# Patient Record
Sex: FEMALE | Race: WHITE | Employment: FULL TIME | URBAN - METROPOLITAN AREA
[De-identification: names, ages, dates, MRNs, and addresses within clinical notes are randomized per-mention and may not be internally consistent; named-entity substitution may affect disease eponyms.]

---

## 2017-02-07 DIAGNOSIS — F32.A DEPRESSION: ICD-10-CM

## 2017-02-07 DIAGNOSIS — E03.9 ACQUIRED HYPOTHYROIDISM: ICD-10-CM

## 2017-02-07 RX ORDER — LEVOTHYROXINE SODIUM 125 UG/1
TABLET ORAL
Qty: 90 TAB | Refills: 0 | Status: SHIPPED | OUTPATIENT
Start: 2017-02-07 | End: 2017-05-03 | Stop reason: SDUPTHER

## 2017-02-07 RX ORDER — PAROXETINE HYDROCHLORIDE 20 MG/1
TABLET, FILM COATED ORAL
Qty: 90 TAB | Refills: 0 | Status: SHIPPED | OUTPATIENT
Start: 2017-02-07 | End: 2017-05-03 | Stop reason: SDUPTHER

## 2017-02-13 ENCOUNTER — OFFICE VISIT (OUTPATIENT)
Dept: FAMILY MEDICINE CLINIC | Age: 47
End: 2017-02-13

## 2017-02-13 VITALS
HEART RATE: 67 BPM | TEMPERATURE: 98.2 F | DIASTOLIC BLOOD PRESSURE: 92 MMHG | OXYGEN SATURATION: 98 % | WEIGHT: 197 LBS | RESPIRATION RATE: 20 BRPM | SYSTOLIC BLOOD PRESSURE: 140 MMHG | HEIGHT: 66 IN | BODY MASS INDEX: 31.66 KG/M2

## 2017-02-13 DIAGNOSIS — H10.33 ACUTE CONJUNCTIVITIS OF BOTH EYES, UNSPECIFIED ACUTE CONJUNCTIVITIS TYPE: Primary | ICD-10-CM

## 2017-02-13 RX ORDER — POLYMYXIN B SULFATE AND TRIMETHOPRIM 1; 10000 MG/ML; [USP'U]/ML
1 SOLUTION OPHTHALMIC EVERY 6 HOURS
Qty: 10 ML | Refills: 0 | Status: SHIPPED | OUTPATIENT
Start: 2017-02-13 | End: 2017-02-20

## 2017-02-13 NOTE — PROGRESS NOTES
Acute Care Visit    Today's Date:  2017   Patient's Name: Ashanti Thomson   Patient's :  1970     History:     Chief Complaint   Patient presents with    Eye Problem     pt here with c/o itchy, and running  and irritation noted to OU       Ashanti Thomson is a 40 y.o. female presenting for [address] Pain w/ itching and stinging  Onset: 2 weeks ago noticed drainage and crusting  She says that the issue started with the weather changes  Denies associated symptoms  Denies vision problems  Denies eye trauma  Tried visine with no improvements      Past Medical History   Diagnosis Date    GERD (gastroesophageal reflux disease)     Mitral valve prolapse     Other ill-defined conditions(799.89)      mitral valve prolapse    Thyroid disease      hypo     Past Surgical History   Procedure Laterality Date    Hx other surgical      Hx gyn       ov cyst    Hx gyn       d & c's     Social History     Social History    Marital status:      Spouse name: N/A    Number of children: N/A    Years of education: N/A     Social History Main Topics    Smoking status: Former Smoker    Smokeless tobacco: None    Alcohol use No    Drug use: No    Sexual activity: Yes     Partners: Male     Birth control/ protection: None, Surgical     Other Topics Concern    None     Social History Narrative     Family History   Problem Relation Age of Onset    Thyroid Disease Mother     Heart Disease Father     Hypertension Father     Diabetes Father      No Known Allergies    Problem List:    There is no problem list on file for this patient. Medications:     Current Outpatient Prescriptions   Medication Sig    trimethoprim-polymyxin b (POLYTRIM) ophthalmic solution Administer 1 Drop to both eyes every six (6) hours for 7 days.     PARoxetine (PAXIL) 20 mg tablet TAKE ONE TABLET BY MOUTH EVERY DAY    levothyroxine (SYNTHROID) 125 mcg tablet TAKE ONE TABLET BY MOUTH EVERY DAY BEFORE BREAKFAST    DEXILANT 60 mg CpDB TAKE ONE CAPSULE BY MOUTH EVERY DAY FOR GERD    doxycycline (MONODOX) 100 mg capsule TAKE ONE CAPSULE BY Twice daily    simvastatin (ZOCOR) 20 mg tablet Take 1 Tab by mouth nightly.  clindamycin (CLEOCIN T) 1 % external solution use thin film on affected area    multivitamin (ONE A DAY) tablet Take 1 Tab by mouth daily. Indications: VITAMIN DEFICIENCY PREVENTION    albuterol (PROVENTIL HFA, VENTOLIN HFA, PROAIR HFA) 90 mcg/actuation inhaler Take 1 Puff by inhalation every six (6) hours as needed for Wheezing. No current facility-administered medications for this visit. Constitutional: negative for fevers, chills, sweats and fatigue  Ears, nose, mouth, throat, and face: negative for earaches, nasal congestion and sore throat  Respiratory: negative for cough, sputum or wheezing  Cardiovascular: negative for chest pain, chest pressure/discomfort, dyspnea  Gastrointestinal: negative for nausea, vomiting, diarrhea, constipation and abdominal pain    Physical Assessment:   VS:    Visit Vitals    BP (!) 140/92 (BP 1 Location: Left arm, BP Patient Position: Sitting)    Pulse 67    Temp 98.2 °F (36.8 °C) (Oral)    Resp 20    Ht 5' 6\" (1.676 m)    Wt 197 lb (89.4 kg)    LMP 08/10/2013    SpO2 98%    BMI 31.8 kg/m2       Lab Results   Component Value Date/Time    Sodium 140 08/19/2013 08:42 AM    Potassium 4.0 08/19/2013 08:42 AM    Chloride 107 08/19/2013 08:42 AM    CO2 27 08/19/2013 08:42 AM    Anion gap 6 08/19/2013 08:42 AM    Glucose 133 08/19/2013 08:42 AM    BUN 9 08/19/2013 08:42 AM    Creatinine 0.89 08/19/2013 08:42 AM    BUN/Creatinine ratio 10 08/19/2013 08:42 AM    GFR est AA >60 08/19/2013 08:42 AM    GFR est non-AA >60 08/19/2013 08:42 AM    Calcium 8.6 08/19/2013 08:42 AM       General:   Well-groomed, well-nourished, in no distress, pleasant, alert, appropriate and conversant.    Eyes:    PERRL, mild erythema bilaterally  Mouth:  Good dentition, oropharynx WNL without membranes, exudates, petechiae or ulcers  Neck:   Neck supple, no swelling, mass or tenderness, no thyromegaly  Cardiovasc:   RRR, no MRG. Pulses 2+ and symmetric at distal extremities. Pulmonary:   Lungs clear bilaterally. Normal respiratory effort. Extremities:   No edema, no TTP bilateral calves. LEs warm and well-perfused. Neuro:   Alert and oriented, no focal deficits. No facial asymmetry noted. Skin:    No rashes or jaundice  MSK:   Normal ROM, 5/5 muscle strength  Psych:  No pressured speech or abnormal thought content        Assessment/Plan & Orders:       1. Acute conjunctivitis of both eyes, unspecified acute conjunctivitis type        Orders Placed This Encounter    trimethoprim-polymyxin b (POLYTRIM) ophthalmic solution       Acute Conjunctivitis vs Allergies  -recommend polytrim and starting antihistamine. If no improvements will recommend f/u with [facility]. Follow up as needed    *Plan of care reviewed with patient. Patient in agreement with plan and expresses understanding. All questions answered and patient encouraged to call or RTO if further questions or concerns.     Radha Razo MD  Family Medicine  2/13/2017  4:20 PM

## 2017-02-13 NOTE — PATIENT INSTRUCTIONS
Pinkeye: Care Instructions  Your Care Instructions    Pinkeye is redness and swelling of the eye surface and the conjunctiva (the lining of the eyelid and the covering of the white part of the eye). Pinkeye is also called conjunctivitis. Pinkeye is often caused by infection with bacteria or a virus. Dry air, allergies, smoke, and chemicals are other common causes. Pinkeye often clears on its own in 7 to 10 days. Antibiotics only help if the pinkeye is caused by bacteria. Pinkeye caused by infection spreads easily. If an allergy or chemical is causing pinkeye, it will not go away unless you can avoid whatever is causing it. Follow-up care is a key part of your treatment and safety. Be sure to make and go to all appointments, and call your doctor if you are having problems. Its also a good idea to know your test results and keep a list of the medicines you take. How can you care for yourself at home? · Wash your hands often. Always wash them before and after you treat pinkeye or touch your eyes or face. · Use moist cotton or a clean, wet cloth to remove crust. Wipe from the inside corner of the eye to the outside. Use a clean part of the cloth for each wipe. · Put cold or warm wet cloths on your eye a few times a day if the eye hurts. · Do not wear contact lenses or eye makeup until the pinkeye is gone. Throw away any eye makeup you were using when you got pinkeye. Clean your contacts and storage case. If you wear disposable contacts, use a new pair when your eye has cleared and it is safe to wear contacts again. · If the doctor gave you antibiotic ointment or eyedrops, use them as directed. Use the medicine for as long as instructed, even if your eye starts looking better soon. Keep the bottle tip clean, and do not let it touch the eye area. · To put in eyedrops or ointment:  ¨ Tilt your head back, and pull your lower eyelid down with one finger.   ¨ Drop or squirt the medicine inside the lower lid.  ¨ Close your eye for 30 to 60 seconds to let the drops or ointment move around. ¨ Do not touch the ointment or dropper tip to your eyelashes or any other surface. · Do not share towels, pillows, or washcloths while you have pinkeye. When should you call for help? Call your doctor now or seek immediate medical care if:  · You have pain in your eye, not just irritation on the surface. · You have a change in vision or loss of vision. · You have an increase in discharge from the eye. · Your eye has not started to improve or begins to get worse within 48 hours after you start using antibiotics. · Pinkeye lasts longer than 7 days. Watch closely for changes in your health, and be sure to contact your doctor if you have any problems. Where can you learn more? Go to http://ean-brandon.info/. Enter Y392 in the search box to learn more about \"Pinkeye: Care Instructions. \"  Current as of: May 27, 2016  Content Version: 11.1  © 9311-6855 [employer]. Care instructions adapted under license by [employer] (which disclaims liability or warranty for this information). If you have questions about a medical condition or this instruction, always ask your healthcare professional. [address] any warranty or liability for your use of this information.

## 2017-02-24 ENCOUNTER — PATIENT OUTREACH (OUTPATIENT)
Dept: OTHER | Age: 47
End: 2017-02-24

## 2017-02-27 ENCOUNTER — PATIENT OUTREACH (OUTPATIENT)
Dept: OTHER | Age: 47
End: 2017-02-27

## 2017-03-01 ENCOUNTER — PATIENT OUTREACH (OUTPATIENT)
Dept: OTHER | Age: 47
End: 2017-03-01

## 2017-03-01 NOTE — PROGRESS NOTES
Patient identified as eligible for [address] [place] [address] services. Second telephone outreach attempted. Left discreet voicemail with this CM confidential contact information. Will send UTR letter.

## 2017-03-02 DIAGNOSIS — E78.00 PURE HYPERCHOLESTEROLEMIA: ICD-10-CM

## 2017-03-02 RX ORDER — DEXLANSOPRAZOLE 60 MG/1
CAPSULE, DELAYED RELEASE ORAL
Qty: 90 CAP | Refills: 0 | Status: SHIPPED | OUTPATIENT
Start: 2017-03-02 | End: 2017-06-08 | Stop reason: SDUPTHER

## 2017-03-02 RX ORDER — SIMVASTATIN 20 MG/1
TABLET, FILM COATED ORAL
Qty: 90 TAB | Refills: 0 | Status: SHIPPED | OUTPATIENT
Start: 2017-03-02 | End: 2017-06-08 | Stop reason: SDUPTHER

## 2017-04-28 ENCOUNTER — TELEPHONE (OUTPATIENT)
Dept: FAMILY MEDICINE CLINIC | Age: 47
End: 2017-04-28

## 2017-04-28 NOTE — TELEPHONE ENCOUNTER
Call made to pt to see how I could be of assistance to her. Pt advised that she is having a allergy flare up and wanted to know if Dr Brenna Garza could call her in something to the pharmacy. Pt also stated that she was told by someone(she can't remember the person name she spoke with) that we were unable to treat [facility]. I advised pt that we are the [facility] and we also take walk-ins. I advised pt that she needs a office visit. I then asked if pt wanted to walk over to be treated today, pt stated she has a meeting in 2 min, however she would come over later today. I also advised that [facility] Patients are worked in between our regular scheduled patients. I then advised pt that rather than emailing Provider she should send all messages thru [facility] to assure proper follow-up. Pt verbalized understanding.

## 2017-05-11 ENCOUNTER — OFFICE VISIT (OUTPATIENT)
Dept: FAMILY MEDICINE CLINIC | Age: 47
End: 2017-05-11

## 2017-05-11 VITALS
HEART RATE: 78 BPM | TEMPERATURE: 98.3 F | SYSTOLIC BLOOD PRESSURE: 158 MMHG | RESPIRATION RATE: 18 BRPM | BODY MASS INDEX: 31.82 KG/M2 | OXYGEN SATURATION: 96 % | WEIGHT: 198 LBS | HEIGHT: 66 IN | DIASTOLIC BLOOD PRESSURE: 82 MMHG

## 2017-05-11 DIAGNOSIS — J01.90 ACUTE BACTERIAL SINUSITIS: Primary | ICD-10-CM

## 2017-05-11 DIAGNOSIS — B96.89 ACUTE BACTERIAL SINUSITIS: Primary | ICD-10-CM

## 2017-05-11 RX ORDER — AMOXICILLIN AND CLAVULANATE POTASSIUM 875; 125 MG/1; MG/1
1 TABLET, FILM COATED ORAL 2 TIMES DAILY
Qty: 20 TAB | Refills: 0 | Status: SHIPPED | OUTPATIENT
Start: 2017-05-11 | End: 2017-05-21

## 2017-05-11 NOTE — PROGRESS NOTES
Patient: Mi Best MRN: [id number]  SSN: [id number]    YOB: 1970  Age: 40 y.o. Sex: female      Date of Service: 5/11/2017   Provider: ANA Crawford   Office Location:   [address] [place] [address] [facility], [address] [place] [address], [address]  Office Phone: [phone]  Office Fax: [phone]        REASON FOR VISIT:   Chief Complaint   Patient presents with    Pressure Behind the Eyes     sneezing, watery eyes, headache    Ear Pain     right ear    Cough     dry cough that keeps her up at night        VITALS:   Visit Vitals    /82 (BP 1 Location: Right arm, BP Patient Position: Sitting)    Pulse 78    Temp 98.3 °F (36.8 °C) (Oral)    Resp 18    Ht 5' 6\" (1.676 m)    Wt 198 lb (89.8 kg)    LMP 08/10/2013    SpO2 96%    BMI 31.96 kg/m2       MEDICATIONS:   Current Outpatient Prescriptions   Medication Sig Dispense Refill    levothyroxine (SYNTHROID) 125 mcg tablet TAKE ONE TABLET BY MOUTH EVERY DAY BEFORE BREAKFAST 90 Tab 1    PARoxetine (PAXIL) 20 mg tablet TAKE ONE TABLET BY MOUTH EVERY DAY 90 Tab 1    clindamycin (CLEOCIN T) 1 % external solution USE THIN FILM ON AFFECTED AREA 60 mL 5    DEXILANT 60 mg CpDB TAKE ONE CAPSULE BY MOUTH EVERY DAY FOR GERD 90 Cap 0    simvastatin (ZOCOR) 20 mg tablet TAKE ONE TABLET BY MOUTH NIGHTLY 90 Tab 0    albuterol (PROVENTIL HFA, VENTOLIN HFA, PROAIR HFA) 90 mcg/actuation inhaler Take 1 Puff by inhalation every six (6) hours as needed for Wheezing. 1 Inhaler 3    doxycycline (MONODOX) 100 mg capsule TAKE ONE CAPSULE BY Twice daily 60 Cap 3    multivitamin (ONE A DAY) tablet Take 1 Tab by mouth daily. Indications: VITAMIN DEFICIENCY PREVENTION          ALLERGIES:   No Known Allergies     ACTIVE MEDICAL PROBLEMS:  There is no problem list on file for this patient.        MEDICAL/SURGICAL HISTORY:  Past Medical History:   Diagnosis Date    GERD (gastroesophageal reflux disease)     Mitral valve prolapse     Other ill-defined conditions     mitral valve prolapse    Thyroid disease     hypo      Past Surgical History:   Procedure Laterality Date    HX GYN      ov cyst    HX GYN      d & c's    HX OTHER SURGICAL          FAMILY HISTORY:  Family History   Problem Relation Age of Onset    Thyroid Disease Mother     Heart Disease Father     Hypertension Father     Diabetes Father         SOCIAL HISTORY:  Social History   Substance Use Topics    Smoking status: Former Smoker    Smokeless tobacco: Not on file    Alcohol use No          HISTORY OF PRESENT ILLNESS:   Gregorio Saleem is a 40 y.o. female who presents to the office for evaluation of progressively worsening cough, congestion, and sinus pressure. Has been dealing with seasonal allergies for the past few weeks, was managing symptoms with OTC allergy meds. Then on Sunday 5/7, patient reports she was doing some cleaning, bent forward, and experienced pain in her face. These symptoms have since persisted. Pressure is behind her eyes, and is worse on the right It radiates into her right ear. Admits to feeling hot and cold intermittently, but has not checked her temperature  Admits to dry cough and R ear pain. Denies chest pain, SOB, wheezing, n/v/d    REVIEW OF SYSTEMS:  Review of Systems   Constitutional: Negative for chills and fever. HENT: Positive for congestion and ear pain. Negative for sore throat. Respiratory: Negative for cough, shortness of breath and wheezing. Cardiovascular: Negative for chest pain and palpitations. Gastrointestinal: Negative for diarrhea, nausea and vomiting. Neurological: Positive for headaches. PHYSICAL EXAMINATION:  Physical Exam   Constitutional: She is well-developed, well-nourished, and in no distress. HENT:   Head: Normocephalic and atraumatic. Right Ear: Tympanic membrane is not erythematous, not retracted and not bulging. A middle ear effusion is present.    Left Ear: Tympanic membrane is not erythematous, not retracted and not bulging. A middle ear effusion is present. Nose: Mucosal edema present. Right sinus exhibits maxillary sinus tenderness. Left sinus exhibits maxillary sinus tenderness. Mouth/Throat: Uvula is midline, oropharynx is clear and moist and mucous membranes are normal.   Cardiovascular: Normal rate, regular rhythm and normal heart sounds. Exam reveals no gallop and no friction rub. No murmur heard. Pulmonary/Chest: Effort normal and breath sounds normal. She has no wheezes. She has no rales. Skin: Skin is warm and dry. No rash noted. RESULTS:  No results found for this visit on 05/11/17. ASSESSMENT/PLAN:  Meenakshi Clemente was seen today for pressure behind the eyes, ear pain and cough. Diagnoses and all orders for this visit:    Acute bacterial sinusitis  -     amoxicillin-clavulanate (AUGMENTIN) 875-125 mg per tablet; Take 1 Tab by mouth two (2) times a day for 10 days.    - Start antibiotic as above  - Encouraged continued use of Mucinex, steroid nasal spray as tolerated    Follow up as needed if symptoms persist or worsen    Patient expresses understanding and is agreeable with the above plan.       PATIENT CARE TEAM:   Patient Care Team:  Mendel Octave, MD as PCP - General (Family Practice)  Bassem Mohamud RN as [address] [place] [address]   May 11, 2017    3:20 PM

## 2017-06-08 DIAGNOSIS — E78.00 PURE HYPERCHOLESTEROLEMIA: ICD-10-CM

## 2017-06-08 RX ORDER — DEXLANSOPRAZOLE 60 MG/1
CAPSULE, DELAYED RELEASE ORAL
Qty: 90 CAP | Refills: 0 | Status: SHIPPED | OUTPATIENT
Start: 2017-06-08 | End: 2017-09-11 | Stop reason: SDUPTHER

## 2017-06-08 RX ORDER — SIMVASTATIN 20 MG/1
TABLET, FILM COATED ORAL
Qty: 90 TAB | Refills: 0 | Status: SHIPPED | OUTPATIENT
Start: 2017-06-08 | End: 2017-09-11 | Stop reason: SDUPTHER

## 2017-07-06 ENCOUNTER — TELEPHONE (OUTPATIENT)
Dept: FAMILY MEDICINE CLINIC | Age: 47
End: 2017-07-06

## 2017-07-06 DIAGNOSIS — L73.2 SUPPURATIVE HIDRADENITIS: ICD-10-CM

## 2017-07-06 RX ORDER — DOXYCYCLINE 100 MG/1
CAPSULE ORAL
Qty: 14 CAP | Refills: 0 | Status: SHIPPED | OUTPATIENT
Start: 2017-07-06 | End: 2017-07-06 | Stop reason: SDUPTHER

## 2017-07-06 RX ORDER — DOXYCYCLINE 100 MG/1
CAPSULE ORAL
Qty: 28 CAP | Refills: 0 | Status: SHIPPED | OUTPATIENT
Start: 2017-07-06 | End: 2017-09-18 | Stop reason: SDUPTHER

## 2017-07-06 RX ORDER — DOXYCYCLINE 100 MG/1
CAPSULE ORAL
Qty: 60 CAP | Refills: 0 | OUTPATIENT
Start: 2017-07-06

## 2017-07-06 NOTE — TELEPHONE ENCOUNTER
I will send a 2 week supply, and then she will need an appointment. This medication was prescribed over 1 year ago, and was only given 3 refills, so I am not sure if maybe another provider has been prescribing it for her in the meantime?

## 2017-07-06 NOTE — TELEPHONE ENCOUNTER
Call made to pt, no answer. Message left for her to call office. Pt will need to come in to be reevaluated before refill request will be approved, Per the provider Maryjane PEREZ.

## 2017-07-06 NOTE — TELEPHONE ENCOUNTER
Patient returned the call and I relayed the message that was written by [facility] HAY[facility]. I informed her that she needed an appointment to come in and she stated that she is unable to come in and if she stops taking the medication she is going to start having flare ups. I informed her that in order for another provider to prescribe that medication she would definitely need to come in for an exam and she said that she only has two pills left and she cannot take off of work to come here tomorrow after I offered her an appointment. Patient then told me to put a message in to the nurse with the information that she gave me.

## 2017-08-29 ENCOUNTER — HOSPITAL ENCOUNTER (OUTPATIENT)
Dept: MAMMOGRAPHY | Age: 47
Discharge: HOME | End: 2017-08-29
Attending: FAMILY MEDICINE
Payer: COMMERCIAL

## 2017-08-29 DIAGNOSIS — Z12.31 VISIT FOR SCREENING MAMMOGRAM: ICD-10-CM

## 2017-08-29 PROCEDURE — 77067 SCR MAMMO BI INCL CAD: CPT

## 2017-09-11 DIAGNOSIS — E78.00 PURE HYPERCHOLESTEROLEMIA: ICD-10-CM

## 2017-09-11 NOTE — TELEPHONE ENCOUNTER
Requested Prescriptions     Pending Prescriptions Disp Refills    Dexlansoprazole (DEXILANT) 60 mg CpDB 90 Cap 0    simvastatin (ZOCOR) 20 mg tablet 90 Tab 0     Patient states that the pharmacy told her that they received a denial. She would like to know why the medications were denied.       4:02pm  Patient states that its okay if she has to wait for the statin but she is completely out of her dexalant.

## 2017-09-13 RX ORDER — SIMVASTATIN 20 MG/1
TABLET, FILM COATED ORAL
Qty: 30 TAB | Refills: 0 | Status: SHIPPED | OUTPATIENT
Start: 2017-09-13 | End: 2017-09-18 | Stop reason: SDUPTHER

## 2017-09-13 RX ORDER — DEXLANSOPRAZOLE 60 MG/1
CAPSULE, DELAYED RELEASE ORAL
Qty: 30 CAP | Refills: 0 | Status: SHIPPED | OUTPATIENT
Start: 2017-09-13 | End: 2017-09-18 | Stop reason: SDUPTHER

## 2017-09-13 NOTE — TELEPHONE ENCOUNTER
9/13/2017  2:10 PM    Chief Complaint   Patient presents with    Medication Refill       Noted medication refill request. PCP Dr Steven Denis. Patient out of medication. Has upcoming appointment. Refills completed.      Requested Prescriptions     Signed Prescriptions Disp Refills    Dexlansoprazole (DEXILANT) 60 mg CpDB 30 Cap 0     Sig: TAKE ONE CAPSULE BY MOUTH EVERY DAY FOR GERD     Authorizing Provider: Koby Tran    simvastatin (ZOCOR) 20 mg tablet 30 Tab 0     Sig: TAKE ONE TABLET BY MOUTH NIGHTLY     Authorizing Provider: Koby Tran

## 2017-09-13 NOTE — TELEPHONE ENCOUNTER
Call made to pt and 2 identifiers used. Explained to pt she needed to be seen and have labs done before refills could be sent in. Pt states she is completely out of both medication and she is going out of town in the morning. Pt has an appointment on 9/18. Spoke with Alida RICKS. Made aware of above. Will refill for 30 days. Pt needs to keep her appointment on 9/18 for assessment, lab orders and additional refills. Pt made aware of this and understands.

## 2017-09-18 ENCOUNTER — OFFICE VISIT (OUTPATIENT)
Dept: FAMILY MEDICINE CLINIC | Age: 47
End: 2017-09-18

## 2017-09-18 VITALS
RESPIRATION RATE: 20 BRPM | SYSTOLIC BLOOD PRESSURE: 165 MMHG | WEIGHT: 195 LBS | HEIGHT: 66 IN | OXYGEN SATURATION: 95 % | TEMPERATURE: 97.9 F | BODY MASS INDEX: 31.34 KG/M2 | DIASTOLIC BLOOD PRESSURE: 85 MMHG | HEART RATE: 62 BPM

## 2017-09-18 DIAGNOSIS — E78.00 PURE HYPERCHOLESTEROLEMIA: ICD-10-CM

## 2017-09-18 DIAGNOSIS — L73.2 SUPPURATIVE HIDRADENITIS: ICD-10-CM

## 2017-09-18 DIAGNOSIS — I10 ESSENTIAL HYPERTENSION: ICD-10-CM

## 2017-09-18 DIAGNOSIS — K21.9 GASTROESOPHAGEAL REFLUX DISEASE WITHOUT ESOPHAGITIS: Primary | ICD-10-CM

## 2017-09-18 RX ORDER — CLINDAMYCIN PHOSPHATE 11.9 MG/ML
SOLUTION TOPICAL
Qty: 120 ML | Refills: 5 | Status: SHIPPED | OUTPATIENT
Start: 2017-09-18 | End: 2021-01-01

## 2017-09-18 RX ORDER — DOXYCYCLINE 100 MG/1
CAPSULE ORAL
Qty: 60 CAP | Refills: 3 | Status: SHIPPED | OUTPATIENT
Start: 2017-09-18 | End: 2018-03-14 | Stop reason: ALTCHOICE

## 2017-09-18 RX ORDER — DEXLANSOPRAZOLE 60 MG/1
CAPSULE, DELAYED RELEASE ORAL
Qty: 90 CAP | Refills: 1 | Status: SHIPPED | OUTPATIENT
Start: 2017-09-18 | End: 2018-01-19 | Stop reason: SDUPTHER

## 2017-09-18 RX ORDER — SIMVASTATIN 20 MG/1
TABLET, FILM COATED ORAL
Qty: 90 TAB | Refills: 1 | Status: SHIPPED | OUTPATIENT
Start: 2017-09-18 | End: 2018-05-01 | Stop reason: SDUPTHER

## 2017-09-18 RX ORDER — LEVOTHYROXINE SODIUM 125 UG/1
TABLET ORAL
Qty: 90 TAB | Refills: 1 | Status: SHIPPED | OUTPATIENT
Start: 2017-09-18 | End: 2018-05-01 | Stop reason: SDUPTHER

## 2017-09-18 RX ORDER — LISINOPRIL AND HYDROCHLOROTHIAZIDE 20; 25 MG/1; MG/1
1 TABLET ORAL DAILY
Qty: 90 TAB | Refills: 1 | Status: SHIPPED | OUTPATIENT
Start: 2017-09-18 | End: 2018-02-28 | Stop reason: ALTCHOICE

## 2017-09-18 NOTE — PATIENT INSTRUCTIONS

## 2017-09-18 NOTE — PROGRESS NOTES
Chronic Illness Visit    Today's Date:  2017   Patient's Name: Bebeto Hyman   Patient's :  1970     History:     Chief Complaint   Patient presents with    Follow Up Chronic Condition     pt here for follow up chronic conditions Jes Mullen is a 56 y.o. female presenting for a follow up of Chronic Illness. Hypertension/Hyperlipidemia    BP today is at goal and less [id number]/90. Patients risk factors include: obese  Patient is checking home BP and they are in the following range:   >844 (systolic)  Reports compliance and denies side effects to medications  Daily exercise and diet are as follows: denies healthy diet or exercise  Weight is stable  Takes the below meds regularly with no side effects. Last LDL: unknown      GERD    Denies: Dysphagia, heartburn, abdominal pain, nausea/vomiting, excessive bleching.   Taking the following medication with no side effects  Reports lifestyle modifications  No recent endoscopy or colnoscopy      Past Medical History:   Diagnosis Date    GERD (gastroesophageal reflux disease)     Mitral valve prolapse     Other ill-defined conditions     mitral valve prolapse    Thyroid disease     hypo     Past Surgical History:   Procedure Laterality Date    HX GYN      ov cyst    HX GYN      d & c's    HX OTHER SURGICAL       Social History     Social History    Marital status:      Spouse name: N/A    Number of children: N/A    Years of education: N/A     Social History Main Topics    Smoking status: Former Smoker    Smokeless tobacco: Never Used    Alcohol use No    Drug use: No    Sexual activity: Yes     Partners: Male     Birth control/ protection: None, Surgical     Other Topics Concern    None     Social History Narrative     Family History   Problem Relation Age of Onset    Thyroid Disease Mother     Heart Disease Father     Hypertension Father     Diabetes Father     Cataract Brother      No Known Allergies    Problem List:    There is no problem list on file for this patient. Medications:     Current Outpatient Prescriptions   Medication Sig    Dexlansoprazole (DEXILANT) 60 mg CpDB TAKE ONE CAPSULE BY MOUTH EVERY DAY FOR GERD    simvastatin (ZOCOR) 20 mg tablet TAKE ONE TABLET BY MOUTH NIGHTLY    levothyroxine (SYNTHROID) 125 mcg tablet TAKE ONE TABLET BY MOUTH EVERY DAY BEFORE BREAKFAST    lisinopril-hydroCHLOROthiazide (PRINZIDE, ZESTORETIC) 20-25 mg per tablet Take 1 Tab by mouth daily.  doxycycline (MONODOX) 100 mg capsule TAKE ONE CAPSULE BY Twice daily    clindamycin (CLEOCIN T) 1 % external solution use thin film on affected area BID    PARoxetine (PAXIL) 20 mg tablet TAKE ONE TABLET BY MOUTH EVERY DAY    multivitamin (ONE A DAY) tablet Take 1 Tab by mouth daily. Indications: VITAMIN DEFICIENCY PREVENTION    albuterol (PROVENTIL HFA, VENTOLIN HFA, PROAIR HFA) 90 mcg/actuation inhaler Take 1 Puff by inhalation every six (6) hours as needed for Wheezing. No current facility-administered medications for this visit.           Constitutional: negative for fevers, chills, sweats and fatigue  Respiratory: negative for cough, sputum or wheezing  Cardiovascular: negative for chest pain, chest pressure/discomfort, dyspnea  Gastrointestinal: negative for dyspepsia, reflux symptoms, nausea, vomiting, diarrhea, constipation and abdominal pain  Integument/breast: negative for rash, skin lesion(s) and pruritus    Physical Assessment:   VS:    Visit Vitals    /85 (BP 1 Location: Left arm, BP Patient Position: Sitting)    Pulse 62    Temp 97.9 °F (36.6 °C) (Oral)    Resp 20    Ht 5' 6\" (1.676 m)    Wt 195 lb (88.5 kg)    LMP 08/10/2013    SpO2 95%    BMI 31.47 kg/m2       Lab Results   Component Value Date/Time    Sodium 140 08/19/2013 08:42 AM    Potassium 4.0 08/19/2013 08:42 AM    Chloride 107 08/19/2013 08:42 AM    CO2 27 08/19/2013 08:42 AM    Anion gap 6 08/19/2013 08:42 AM    Glucose 133 08/19/2013 08:42 AM    BUN 9 08/19/2013 08:42 AM    Creatinine 0.89 08/19/2013 08:42 AM    BUN/Creatinine ratio 10 08/19/2013 08:42 AM    GFR est AA >60 08/19/2013 08:42 AM    GFR est non-AA >60 08/19/2013 08:42 AM    Calcium 8.6 08/19/2013 08:42 AM       General:   Well-groomed, well-nourished, in no distress, pleasant, alert, appropriate and conversant. Mouth:  Good dentition, oropharynx WNL without membranes, exudates, petechiae or ulcers  Neck:   Neck supple, no swelling, mass or tenderness, no thyromegaly  Cardiovasc:   RRR, no MRG. Pulses 2+ and symmetric at distal extremities. Pulmonary:   Lungs clear bilaterally. Normal respiratory effort. Abdomen:   Abdomen soft, NT, ND, NAB. Extremities:   No edema, no TTP bilateral calves. LEs warm and well-perfused. Neuro:   Alert and oriented, no focal deficits. No facial asymmetry noted. Skin:    No rashes or jaundice  MSK:   Normal ROM, 5/5 muscle strength  Psych:  No pressured speech or abnormal thought content        Assessment/Plan & Orders:       1. Gastroesophageal reflux disease without esophagitis    2. Pure hypercholesterolemia    3. Essential hypertension    4. Suppurative hidradenitis        Orders Placed This Encounter    Dexlansoprazole (DEXILANT) 60 mg CpDB    simvastatin (ZOCOR) 20 mg tablet    levothyroxine (SYNTHROID) 125 mcg tablet    lisinopril-hydroCHLOROthiazide (PRINZIDE, ZESTORETIC) 20-25 mg per tablet    doxycycline (MONODOX) 100 mg capsule    clindamycin (CLEOCIN T) 1 % external solution       HTN BP elevated plan to start zestoretic patient checks her BP at work she will call us if BP stays elevated or if she has a side effect. Normal CMP on last years [facility] labs through [facility]  labs sheet should be scanned in.    Hyperlipidemia stable would like patient to continue with statin  GERD will refill dexilant  SH will continue with Clindagel daily and recommend that patient restart doxy if needed for flare    Follow up in 2-3 months    *Plan of care reviewed with patient. Patient in agreement with plan and expresses understanding. All questions answered and patient encouraged to call or RTO if further questions or concerns.     Gabriela Reyes MD  Family Medicine  9/18/2017  4:48 PM

## 2017-11-06 LAB — LDL-C, EXTERNAL: 92

## 2017-11-07 ENCOUNTER — HOSPITAL ENCOUNTER (OUTPATIENT)
Dept: LAB | Age: 47
Discharge: HOME | End: 2017-11-07

## 2017-11-07 PROCEDURE — 36415 COLL VENOUS BLD VENIPUNCTURE: CPT | Performed by: EMERGENCY MEDICINE

## 2017-11-07 PROCEDURE — 86787 VARICELLA-ZOSTER ANTIBODY: CPT | Performed by: EMERGENCY MEDICINE

## 2017-11-09 LAB — VZV IGG SER IA-ACNC: 1178 INDEX

## 2017-11-09 RX ORDER — PAROXETINE HYDROCHLORIDE 20 MG/1
TABLET, FILM COATED ORAL
Qty: 90 TAB | Refills: 0 | Status: SHIPPED | OUTPATIENT
Start: 2017-11-09 | End: 2018-02-16 | Stop reason: SDUPTHER

## 2018-01-15 ENCOUNTER — TELEPHONE (OUTPATIENT)
Dept: FAMILY MEDICINE CLINIC | Age: 48
End: 2018-01-15

## 2018-01-15 NOTE — TELEPHONE ENCOUNTER
Pt called stating she need a pre-auth on her Delixant. She stated she had to pay out of pocket. I contacted the pharmacy and they stated they will refax the request.    Advised patient I will hand the pre-auth to nurse.

## 2018-01-19 DIAGNOSIS — K21.9 GASTROESOPHAGEAL REFLUX DISEASE WITHOUT ESOPHAGITIS: ICD-10-CM

## 2018-01-19 RX ORDER — DEXLANSOPRAZOLE 60 MG/1
CAPSULE, DELAYED RELEASE ORAL
Qty: 90 CAP | Refills: 1 | Status: SHIPPED | OUTPATIENT
Start: 2018-01-19 | End: 2018-01-19 | Stop reason: SDUPTHER

## 2018-01-19 NOTE — TELEPHONE ENCOUNTER
Requested Prescriptions     Pending Prescriptions Disp Refills    Dexlansoprazole (DEXILANT) 60 mg CpDB 90 Cap 1     Sig: TAKE ONE CAPSULE BY MOUTH EVERY DAY FOR GERD     Pt wants to switch the pharmacy.  [facility]

## 2018-01-22 ENCOUNTER — TELEPHONE (OUTPATIENT)
Dept: FAMILY MEDICINE CLINIC | Age: 48
End: 2018-01-22

## 2018-01-22 RX ORDER — DEXLANSOPRAZOLE 60 MG/1
CAPSULE, DELAYED RELEASE ORAL
Qty: 90 CAP | Refills: 1 | Status: SHIPPED | OUTPATIENT
Start: 2018-01-22 | End: 2018-02-28 | Stop reason: ALTCHOICE

## 2018-01-22 NOTE — TELEPHONE ENCOUNTER
Attempted to contact patient in reference to Rx for Dexilant 60 mg . [facility] ([phone]) was called for prior authorization and it was explained by Ramakrishna Mandel the [employer] rep  that the patient insurance only cover contraceptive drugs. Lvm for patient to call our office .

## 2018-02-16 NOTE — TELEPHONE ENCOUNTER
Requested Prescriptions     Pending Prescriptions Disp Refills    PARoxetine (PAXIL) 20 mg tablet 90 Tab 0

## 2018-02-19 RX ORDER — PAROXETINE HYDROCHLORIDE 20 MG/1
20 TABLET, FILM COATED ORAL DAILY
Qty: 30 TAB | Refills: 0 | Status: SHIPPED | OUTPATIENT
Start: 2018-02-19 | End: 2018-02-28 | Stop reason: ALTCHOICE

## 2018-02-28 ENCOUNTER — OFFICE VISIT (OUTPATIENT)
Dept: INTERNAL MEDICINE CLINIC | Age: 48
End: 2018-02-28

## 2018-02-28 VITALS
TEMPERATURE: 97 F | RESPIRATION RATE: 16 BRPM | HEIGHT: 66 IN | BODY MASS INDEX: 28.45 KG/M2 | HEART RATE: 78 BPM | WEIGHT: 177 LBS | SYSTOLIC BLOOD PRESSURE: 95 MMHG | DIASTOLIC BLOOD PRESSURE: 56 MMHG | OXYGEN SATURATION: 97 %

## 2018-02-28 DIAGNOSIS — K21.9 GASTROESOPHAGEAL REFLUX DISEASE WITHOUT ESOPHAGITIS: ICD-10-CM

## 2018-02-28 DIAGNOSIS — K11.20 SIALADENITIS: ICD-10-CM

## 2018-02-28 DIAGNOSIS — E03.9 ACQUIRED HYPOTHYROIDISM: ICD-10-CM

## 2018-02-28 DIAGNOSIS — I10 ESSENTIAL HYPERTENSION: Primary | ICD-10-CM

## 2018-02-28 DIAGNOSIS — H92.02 LEFT EAR PAIN: ICD-10-CM

## 2018-02-28 DIAGNOSIS — M54.2 NECK PAIN: ICD-10-CM

## 2018-02-28 DIAGNOSIS — E78.5 HYPERLIPIDEMIA, UNSPECIFIED HYPERLIPIDEMIA TYPE: ICD-10-CM

## 2018-02-28 DIAGNOSIS — R73.01 IFG (IMPAIRED FASTING GLUCOSE): ICD-10-CM

## 2018-02-28 DIAGNOSIS — E04.2 MULTIPLE THYROID NODULES: ICD-10-CM

## 2018-02-28 PROBLEM — I34.1 MITRAL VALVE PROLAPSE: Status: ACTIVE | Noted: 2018-02-28

## 2018-02-28 PROBLEM — L70.0 ACNE VULGARIS: Status: ACTIVE | Noted: 2018-02-28

## 2018-02-28 RX ORDER — LISINOPRIL 20 MG/1
20 TABLET ORAL DAILY
Qty: 30 TAB | Refills: 6 | Status: CANCELLED | OUTPATIENT
Start: 2018-02-28

## 2018-02-28 RX ORDER — AMOXICILLIN 500 MG/1
500 CAPSULE ORAL 2 TIMES DAILY
Qty: 14 CAP | Refills: 0 | Status: SHIPPED | OUTPATIENT
Start: 2018-02-28 | End: 2018-03-07

## 2018-02-28 RX ORDER — AMOXICILLIN 500 MG/1
500 CAPSULE ORAL 2 TIMES DAILY
Qty: 14 CAP | Refills: 0 | Status: SHIPPED | OUTPATIENT
Start: 2018-02-28 | End: 2018-02-28 | Stop reason: SDUPTHER

## 2018-02-28 NOTE — TELEPHONE ENCOUNTER
Pt called back because she gave the wrong name of the pharmacy for her rx for amoxicillin, it went to [facility], I have updated it, it needs to go to Marilou Garcia at [place], [place]

## 2018-02-28 NOTE — PATIENT INSTRUCTIONS
Body Mass Index: Care Instructions  Your Care Instructions    Body mass index (BMI) can help you see if your weight is raising your risk for health problems. It uses a formula to compare how much you weigh with how tall you are. · A BMI lower than 18.5 is considered underweight. · A BMI between 18.5 and 24.9 is considered healthy. · A BMI between 25 and 29.9 is considered overweight. A BMI of 30 or higher is considered obese. If your BMI is in the normal range, it means that you have a lower risk for weight-related health problems. If your BMI is in the overweight or obese range, you may be at increased risk for weight-related health problems, such as high blood pressure, heart disease, stroke, arthritis or joint pain, and diabetes. If your BMI is in the underweight range, you may be at increased risk for health problems such as fatigue, lower protection (immunity) against illness, muscle loss, bone loss, hair loss, and hormone problems. BMI is just one measure of your risk for weight-related health problems. You may be at higher risk for health problems if you are not active, you eat an unhealthy diet, or you drink too much alcohol or use tobacco products. Follow-up care is a key part of your treatment and safety. Be sure to make and go to all appointments, and call your doctor if you are having problems. It's also a good idea to know your test results and keep a list of the medicines you take. How can you care for yourself at home? · Practice healthy eating habits. This includes eating plenty of fruits, vegetables, whole grains, lean protein, and low-fat dairy. · If your doctor recommends it, get more exercise. Walking is a good choice. Bit by bit, increase the amount you walk every day. Try for at least 30 minutes on most days of the week. · Do not smoke. Smoking can increase your risk for health problems. If you need help quitting, talk to your doctor about stop-smoking programs and medicines. These can increase your chances of quitting for good. · Limit alcohol to 2 drinks a day for men and 1 drink a day for women. Too much alcohol can cause health problems. If you have a BMI higher than 25  · Your doctor may do other tests to check your risk for weight-related health problems. This may include measuring the distance around your waist. A waist measurement of more than 40 inches in men or 35 inches in women can increase the risk of weight-related health problems. · Talk with your doctor about steps you can take to stay healthy or improve your health. You may need to make lifestyle changes to lose weight and stay healthy, such as changing your diet and getting regular exercise. If you have a BMI lower than 18.5  · Your doctor may do other tests to check your risk for health problems. · Talk with your doctor about steps you can take to stay healthy or improve your health. You may need to make lifestyle changes to gain or maintain weight and stay healthy, such as getting more healthy foods in your diet and doing exercises to build muscle. Where can you learn more? Go to http://ean-brandon.info/. Enter S176 in the search box to learn more about \"Body Mass Index: Care Instructions. \"  Current as of: October 13, 2016  Content Version: 11.4  © 7480-5277 Healthwise, Incorporated. Care instructions adapted under license by [employer] (which disclaims liability or warranty for this information). If you have questions about a medical condition or this instruction, always ask your healthcare professional. [address] any warranty or liability for your use of this information.

## 2018-02-28 NOTE — MR AVS SNAPSHOT
[facility] 
 
 
 [address] 
[phone] Patient: Herson Kirby MRN: [id number] S:[birth year] Visit Information Date & Time Provider Department Dept. Phone Encounter #  
 2/28/2018  2:00 PM Radha Beauchamp MD [facility] [phone] [id number] Follow-up Instructions Return in about 3 weeks (around 3/21/2018), or if symptoms worsen or fail to improve, for BP check. Your Appointments 3/14/2018 11:00 AM  
Office Visit with Radha Beauchamp MD  
[facility] [address]) Appt Note: 3 week f/u  
 [address]  
  
   
 [address] [facility] Upcoming Health Maintenance Date Due DTaP/Tdap/Td series (1 - Tdap) 5/24/1992 PAP AKA CERVICAL CYTOLOGY 7/17/2015 Influenza Age 5 to Adult 8/1/2017 Allergies as of 2/28/2018  Review Complete On: 2/28/2018 By: Radha Beauchamp MD  
 No Known Allergies Current Immunizations  Reviewed on 6/15/2016 No immunizations on file. Not reviewed this visit You Were Diagnosed With   
  
 Codes Comments Acquired hypothyroidism    -  Primary ICD-10-CM: E03.9 ICD-9-CM: 244.9 Essential hypertension     ICD-10-CM: I10 
ICD-9-CM: 401.9 Hyperlipidemia, unspecified hyperlipidemia type     ICD-10-CM: E78.5 ICD-9-CM: 272.4 Neck pain     ICD-10-CM: M54.2 ICD-9-CM: 723.1 Left ear pain     ICD-10-CM: H92.02 
ICD-9-CM: 388.70 Multiple thyroid nodules     ICD-10-CM: E04.2 ICD-9-CM: 241.1 Sialadenitis     ICD-10-CM: K11.20 ICD-9-CM: 527.2 Vitals BP Pulse Temp Resp Height(growth percentile) Weight(growth percentile) 95/56 78 97 °F (36.1 °C) 16 5' 6\" (1.676 m) 177 lb (80.3 kg) LMP SpO2 BMI OB Status Smoking Status 08/10/2013 97% 28.57 kg/m2 Hysterectomy Former Smoker BMI and BSA Data Body Mass Index Body Surface Area 28.57 kg/m 2 1.93 m 2 Preferred Pharmacy Pharmacy Name Phone [address], [address] [phone] Your Updated Medication List  
  
   
This list is accurate as of 2/28/18  2:43 PM.  Always use your most recent med list.  
  
  
  
  
 amoxicillin 500 mg capsule Commonly known as:  AMOXIL Take 1 Cap by mouth two (2) times a day for 7 days. clindamycin 1 % external solution Commonly known as:  CLEOCIN T  
use thin film on affected area BID  
  
 doxycycline 100 mg capsule Commonly known as:  Ger Coca TAKE ONE CAPSULE BY Twice daily  
  
 levothyroxine 125 mcg tablet Commonly known as:  SYNTHROID  
TAKE ONE TABLET BY MOUTH EVERY DAY BEFORE BREAKFAST  
  
 multivitamin tablet Commonly known as:  ONE A DAY Take 1 Tab by mouth daily. Indications: VITAMIN DEFICIENCY PREVENTION  
  
 simvastatin 20 mg tablet Commonly known as:  ZOCOR  
TAKE ONE TABLET BY MOUTH NIGHTLY Prescriptions Sent to Pharmacy Refills  
 amoxicillin (AMOXIL) 500 mg capsule 0 Sig: Take 1 Cap by mouth two (2) times a day for 7 days. Class: Normal  
 Pharmacy: [id number], [address] [place] [address] #: [phone] Route: Oral  
  
We Performed the Following REFERRAL TO ENT-OTOLARYNGOLOGY [IUW75 Custom] Follow-up Instructions Return in about 3 weeks (around 3/21/2018), or if symptoms worsen or fail to improve, for BP check. To-Do List   
 02/28/2018 Lab:  CBC WITH AUTOMATED DIFF Around 02/28/2018 Lab:  METABOLIC PANEL, BASIC   
  
 02/28/2018 Lab:  MICROALBUMIN, UR, RAND W/ MICROALB/CREAT RATIO Around 02/28/2018 Lab:  TSH AND FREE T4 Referral Information Referral ID Referred By Referred To  
  
 [id number], [address], [facility] [address] [place], [address] Phone: [phone] Fax: [phone] Visits Status Start Date End Date 1 New Request 2/28/18 2/28/19 If your referral has a status of pending review or denied, additional information will be sent to support the outcome of this decision. Patient Instructions Body Mass Index: Care Instructions Your Care Instructions Body mass index (BMI) can help you see if your weight is raising your risk for health problems. It uses a formula to compare how much you weigh with how tall you are. · A BMI lower than 18.5 is considered underweight. · A BMI between 18.5 and 24.9 is considered healthy. · A BMI between 25 and 29.9 is considered overweight. A BMI of 30 or higher is considered obese. If your BMI is in the normal range, it means that you have a lower risk for weight-related health problems. If your BMI is in the overweight or obese range, you may be at increased risk for weight-related health problems, such as high blood pressure, heart disease, stroke, arthritis or joint pain, and diabetes. If your BMI is in the underweight range, you may be at increased risk for health problems such as fatigue, lower protection (immunity) against illness, muscle loss, bone loss, hair loss, and hormone problems. BMI is just one measure of your risk for weight-related health problems. You may be at higher risk for health problems if you are not active, you eat an unhealthy diet, or you drink too much alcohol or use tobacco products. Follow-up care is a key part of your treatment and safety. Be sure to make and go to all appointments, and call your doctor if you are having problems. It's also a good idea to know your test results and keep a list of the medicines you take. How can you care for yourself at home? · Practice healthy eating habits. This includes eating plenty of fruits, vegetables, whole grains, lean protein, and low-fat dairy. · If your doctor recommends it, get more exercise. Walking is a good choice. Bit by bit, increase the amount you walk every day. Try for at least 30 minutes on most days of the week. · Do not smoke. Smoking can increase your risk for health problems. If you need help quitting, talk to your doctor about stop-smoking programs and medicines. These can increase your chances of quitting for good. · Limit alcohol to 2 drinks a day for men and 1 drink a day for women. Too much alcohol can cause health problems. If you have a BMI higher than 25 · Your doctor may do other tests to check your risk for weight-related health problems. This may include measuring the distance around your waist. A waist measurement of more than 40 inches in men or 35 inches in women can increase the risk of weight-related health problems. · Talk with your doctor about steps you can take to stay healthy or improve your health. You may need to make lifestyle changes to lose weight and stay healthy, such as changing your diet and getting regular exercise. If you have a BMI lower than 18.5 · Your doctor may do other tests to check your risk for health problems. · Talk with your doctor about steps you can take to stay healthy or improve your health. You may need to make lifestyle changes to gain or maintain weight and stay healthy, such as getting more healthy foods in your diet and doing exercises to build muscle. Where can you learn more? Go to http://ean-brandon.info/. Enter S176 in the search box to learn more about \"Body Mass Index: Care Instructions. \" Current as of: October 13, 2016 Content Version: 11.4 © 7615-9147 Healthwise, Incorporated. Care instructions adapted under license by [facility] [employer] [facility] (which disclaims liability or warranty for this information).  If you have questions about a medical condition or this instruction, always ask your healthcare professional. [employer] disclaims any warranty or liability for your use of this information. Introducing [facility]! Dear Jayla Dominguez: Thank you for requesting a Vessel account. Our records indicate that you already have an active Vessel account. You can access your account anytime at [email or web address] Did you know that you can access your hospital and ER discharge instructions at any time in Vessel? You can also review all of your test results from your hospital stay or ER visit. Additional Information If you have questions, please visit the Frequently Asked Questions section of the Vessel website at [email or web address] Remember, Vessel is NOT to be used for urgent needs. For medical emergencies, dial 911. Now available from your iPhone and Android! Please provide this summary of care documentation to your next provider. Your primary care clinician is listed as [employer] [facility]. If you have any questions after today's visit, please call [phone].

## 2018-02-28 NOTE — PROGRESS NOTES
[facility]:  2/28/2018, MRN: [id number]      Brett Dickey is a 56 y.o. female and presents to clinic to [facility] (left ear and jaw right ear tingling)    Subjective: The pt is a 54yo female with h/o HTN, hypothyroidism, mitral valve prolapse, GERD, thyroid nodules, HLD, and acne vulgaris. 1.  Hypothyroidism: This is a chronic condition, present for at least 6 months. She is on Synthroid. Per review of the EHR, she has not had TFTs in the past several years. The patient was followed Dr. Jass Schwartz in the past for history of thyroid nodules. 2.  Hypertension, Stress, and Obesity: This is a chronic condition, present for at least 6 months. She is taking lisinoprilhydrochlorothiazide 2025 mg daily started last year. At that time, she had newly diagnosed prediabetes with an A1C in the 6 range but lower than 6.5 as well. She was also very stressed from her job - working as a Nurse Navigator coordinator for [employer]. Her BP rx was started. She was started on lisinopril-HCTZ given some swelling she would occasionally get along her hands, causing her rings to fit tightly. She was also started on this rx to reduce her BP. Eventually, she had a biometric screening done. She went to nutritional counseling classes. She made great strides with adhering to a healthy diet. She lost weight! Recently, she reports some dizziness with taking this medication. Her blood pressure is very low today at 95/56. She admits to losing >20  Lbs since last year as mentioned above. Her stress level has improved dramatically. She is coping well with work related stressors. She used to take paxil but ran out. She denies anxiety/depression sx today. Wt Readings from Last 3 Encounters:   02/28/18 177 lb (80.3 kg)   09/18/17 195 lb (88.5 kg)   05/11/17 198 lb (89.8 kg)     BP Readings from Last 3 Encounters:   02/28/18 95/56   09/18/17 165/85   05/11/17 158/82     3.   Left ear and jaw pain: Patient reports left ear and jaw pain. She had left ear pain that began Saturday. The next day, she used a q tip in her affected ear and had some yellow drainage/wax. Last night, she developed associated left sided jaw pain. Pain along her jaw is worse with chewing. \"It almost felt as if someone had hit me\" along the left side of her jaw. It hurts to open her mouth. Associated sx include tinnitus (left sided). No alleviating factors are known. Sx are worsening. Her right ear is starting to feel like her left ear sx. No fever/chills. No sore throat. No cough. No SOB. She has a dental appointment Thursday last week. She has history of mitral valve prolapse and takes amoxicillin just before any teeth cleaning. She took amoxicillin last week as is her routine. She had no adverse side effects from this medication. Per her history, a Panorex was done at the time of her dental cleaning. She was told that everything was okay. 4. HLD: There is a strong family h/o heart disease. Her father has stents in his heart, renal arteries, and carotid arteries. We do not have a copy of her most recent lipid panel done within the past year. She takes simvastatin and reports no adverse side effects of this medication. Patient Active Problem List    Diagnosis Date Noted    Acquired hypothyroidism 02/28/2018    Essential hypertension 02/28/2018    GERD (gastroesophageal reflux disease) 02/28/2018    Hyperlipidemia 02/28/2018    Acne vulgaris 02/28/2018    Mitral valve prolapse 02/28/2018       Current Outpatient Prescriptions   Medication Sig Dispense Refill    amoxicillin (AMOXIL) 500 mg capsule Take 1 Cap by mouth two (2) times a day for 7 days.  14 Cap 0    simvastatin (ZOCOR) 20 mg tablet TAKE ONE TABLET BY MOUTH NIGHTLY 90 Tab 1    levothyroxine (SYNTHROID) 125 mcg tablet TAKE ONE TABLET BY MOUTH EVERY DAY BEFORE BREAKFAST 90 Tab 1    doxycycline (MONODOX) 100 mg capsule TAKE ONE CAPSULE BY Twice daily 60 Cap 3    clindamycin (CLEOCIN T) 1 % external solution use thin film on affected area  mL 5    multivitamin (ONE A DAY) tablet Take 1 Tab by mouth daily. Indications: VITAMIN DEFICIENCY PREVENTION         No Known Allergies    Past Medical History:   Diagnosis Date    GERD (gastroesophageal reflux disease)     Mitral valve prolapse     Other ill-defined conditions(799.89)     mitral valve prolapse    Thyroid disease     hypo       Past Surgical History:   Procedure Laterality Date    HX GYN      ov cyst    HX GYN      d & c's    HX OTHER SURGICAL         Family History   Problem Relation Age of Onset    Thyroid Disease Mother     Heart Disease Father     Hypertension Father     Diabetes Father     Cataract Brother        Social History   Substance Use Topics    Smoking status: Former Smoker    Smokeless tobacco: Never Used    Alcohol use No       ROS   Review of Systems   Constitutional: Negative for chills and fever. HENT: Positive for ear pain. Negative for hearing loss and sore throat. Eyes: Negative for blurred vision and pain. Respiratory: Negative for cough and shortness of breath. Cardiovascular: Negative for chest pain. Gastrointestinal: Negative for abdominal pain, blood in stool and melena. Genitourinary: Negative for dysuria and hematuria. Musculoskeletal: Negative for joint pain and myalgias. Skin: Negative for rash. Neurological: Negative for tingling, focal weakness and headaches. Endo/Heme/Allergies: Does not bruise/bleed easily. Psychiatric/Behavioral: Negative for substance abuse. Objective     Vitals:    02/28/18 1355   BP: 95/56   Pulse: 78   Resp: 16   Temp: 97 °F (36.1 °C)   SpO2: 97%   Weight: 177 lb (80.3 kg)   Height: 5' 6\" (1.676 m)   PainSc:   0 - No pain   LMP: 08/10/2013       Physical Exam   Constitutional: She is oriented to person, place, and time and well-developed, well-nourished, and in no distress.    HENT:   Head: Normocephalic and atraumatic. Right Ear: External ear normal.   Left Ear: External ear normal.   Nose: Nose normal.   Mouth/Throat: Oropharynx is clear and moist. No oropharyngeal exudate. Clear TMs. Her left tragus and mastoid bone areas are TTP. Her left parotid gland area is TTP. There is no erythema along these affected areas. No dental cavities are appreciated on exam.   Eyes: Conjunctivae and EOM are normal. Right eye exhibits no discharge. Left eye exhibits no discharge. No scleral icterus. Neck: Neck supple. Cardiovascular: Normal rate, regular rhythm, normal heart sounds and intact distal pulses. Exam reveals no gallop and no friction rub. No murmur heard. Pulmonary/Chest: Effort normal and breath sounds normal. No respiratory distress. She has no wheezes. She has no rales. Abdominal: Soft. Bowel sounds are normal. She exhibits no distension. There is no tenderness. There is no rebound and no guarding. Musculoskeletal: She exhibits no edema or tenderness (BUE). Lymphadenopathy:     She has cervical adenopathy (left sided). Neurological: She is alert and oriented to person, place, and time. She exhibits normal muscle tone. Gait normal.   Skin: Skin is warm and dry. No erythema. Psychiatric: Affect normal.   Nursing note and vitals reviewed.       LABS   Data Review:   Lab Results   Component Value Date/Time    WBC 5.1 08/19/2013 08:42 AM    HGB 13.8 08/19/2013 08:42 AM    HCT 39.8 08/19/2013 08:42 AM    PLATELET 285 35/21/8396 08:42 AM    MCV 90.5 08/19/2013 08:42 AM       Lab Results   Component Value Date/Time    Sodium 140 08/19/2013 08:42 AM    Potassium 4.0 08/19/2013 08:42 AM    Chloride 107 08/19/2013 08:42 AM    CO2 27 08/19/2013 08:42 AM    Anion gap 6 08/19/2013 08:42 AM    Glucose 133 (H) 08/19/2013 08:42 AM    BUN 9 08/19/2013 08:42 AM    Creatinine 0.89 08/19/2013 08:42 AM    BUN/Creatinine ratio 10 (L) 08/19/2013 08:42 AM    GFR est AA >60 08/19/2013 08:42 AM    GFR est non-AA >60 08/19/2013 08:42 AM    Calcium 8.6 08/19/2013 08:42 AM       Assessment/Plan:   1. Acquired hypothyroidism: +H/o multiple thyroid nodules per pt hx  Checking TFTs and a CBC. A referral was placed to ENT    ORDERS:  - TSH AND FREE T4; Future  - CBC WITH AUTOMATED DIFF; Future    2. Essential hypertension? She was diagnosed during a period of stress which could have falsely raised her BP. Meanwhile, she lost a considerable amt of weight. She is being overmedicated per her BP reading today. Checking TFTs, a BMP, and the urine protein screen. Discontinuing lisinoprilhydrochlorthiazide. Return to clinic in 3 weeks for blood pressure check. We discussed her blood pressure goal of less than 140/90. ORDERS:  - TSH AND FREE T4; Future  - METABOLIC PANEL, BASIC; Future  - MICROALBUMIN, UR, RAND W/ MICROALB/CREAT RATIO; Future    3. Hyperlipidemia and Prediabetes: I reviewed her health screening lipid panel results from 11/6/17. HDL is 44, LDL is 92, total cholesterol is 175, triglycerides are 195, and her fasting BG is 121. Per the ACC/AHA risk assessment, her 10 year CVD risk is 0.7%. No changes need to be made to her simvastatin. Checking an A1c.  Continue with simvastatin for CVD risk reduction that she is tolerating this medication well and given her family history of heart disease. 4. Neck pain: From mastoiditis? Sialadenitis? PE findings are reassuring. Checking a CBC. I am placing a referral to ENT. Ordering a course of amoxicillin. I instructed her to notify me if she has any adverse side effects with medication. ORDERS:  - CBC WITH AUTOMATED DIFF; Future  - REFERRAL TO ENT-OTOLARYNGOLOGY  - amoxicillin (AMOXIL) 500 mg capsule; Take 1 Cap by mouth two (2) times a day for 7 days. Dispense: 14 Cap; Refill: 0    5. GERD: Stable. Observation. 6.  Stress: Stable without medication. Observation.     7.  Health maintenance:  I requesting records from her gynecology team, including her last Pap smear and mammogram.      Health Maintenance Due   Topic Date Due    DTaP/Tdap/Td series (1 - Tdap) 05/24/1992    PAP AKA CERVICAL CYTOLOGY  07/17/2015    Influenza Age 5 to Adult  08/01/2017     Lab review: labs are reviewed in the EHR    I have discussed the diagnosis with the patient and the intended plan as seen in the above orders. The patient has received an after-visit summary and questions were answered concerning future plans. I have discussed medication side effects and warnings with the patient as well. I have reviewed the plan of care with the patient, accepted their input and they are in agreement with the treatment goals. All questions were answered. The patient understands the plan of care. Handouts provided today with above information. Pt instructed if symptoms worsen to call the office or report to the ED for continued care. Greater than 50% of the visit time was spent in counseling and/or coordination of care. Follow-up Disposition:  Return in about 3 weeks (around 3/21/2018), or if symptoms worsen or fail to improve, for BP check.     Opal Millard MD

## 2018-02-28 NOTE — PROGRESS NOTES
1. Have you been to the ER, urgent care clinic since your last visit? Hospitalized since your last visit? No    2. Have you seen or consulted any other health care providers outside of the [address] [place] [address] since your last visit? Include any pap smears or colon screening.  No

## 2018-03-14 ENCOUNTER — OFFICE VISIT (OUTPATIENT)
Dept: INTERNAL MEDICINE CLINIC | Age: 48
End: 2018-03-14

## 2018-03-14 VITALS
BODY MASS INDEX: 28.12 KG/M2 | WEIGHT: 175 LBS | RESPIRATION RATE: 16 BRPM | SYSTOLIC BLOOD PRESSURE: 149 MMHG | OXYGEN SATURATION: 100 % | TEMPERATURE: 97.6 F | HEART RATE: 64 BPM | DIASTOLIC BLOOD PRESSURE: 86 MMHG | HEIGHT: 66 IN

## 2018-03-14 DIAGNOSIS — I10 ESSENTIAL HYPERTENSION: Primary | ICD-10-CM

## 2018-03-14 DIAGNOSIS — E83.52 HYPERCALCEMIA: ICD-10-CM

## 2018-03-14 DIAGNOSIS — E55.9 VITAMIN D DEFICIENCY: ICD-10-CM

## 2018-03-14 DIAGNOSIS — E66.3 OVERWEIGHT: ICD-10-CM

## 2018-03-14 DIAGNOSIS — R73.01 IFG (IMPAIRED FASTING GLUCOSE): ICD-10-CM

## 2018-03-14 DIAGNOSIS — M25.552 LEFT HIP PAIN: ICD-10-CM

## 2018-03-14 DIAGNOSIS — M54.2 NECK PAIN: ICD-10-CM

## 2018-03-14 RX ORDER — METHYLPREDNISOLONE 4 MG/1
TABLET ORAL
Qty: 1 DOSE PACK | Refills: 0 | Status: SHIPPED | OUTPATIENT
Start: 2018-03-14 | End: 2018-04-11 | Stop reason: ALTCHOICE

## 2018-03-14 RX ORDER — LISINOPRIL 20 MG/1
20 TABLET ORAL DAILY
Qty: 90 TAB | Refills: 3 | Status: SHIPPED | OUTPATIENT
Start: 2018-03-14 | End: 2019-06-24 | Stop reason: ALTCHOICE

## 2018-03-14 NOTE — MR AVS SNAPSHOT
[facility] [place] 
 
 
 [address], [facility] [address] [facility] 
[phone] Patient: Alan Box MRN: [id number] QBU:0/88/0330 Visit Information Date & Time Provider Department Dept. Phone Encounter #  
 3/14/2018 11:00 AM Zelalem King MD [facility] [phone] [id number] Follow-up Instructions Return in about 4 weeks (around 4/11/2018) for BP check. Your Appointments 4/11/2018 11:45 AM  
Office Visit with Zelalem King MD  
[facility]) Appt Note: ov per TELECARE [facility] [address] [facility], [facility] [address] [address] [facility]  
  
   
 [address], [address] Upcoming Health Maintenance Date Due DTaP/Tdap/Td series (1 - Tdap) 5/24/1992 PAP AKA CERVICAL CYTOLOGY 7/17/2015 Influenza Age 5 to Adult 8/1/2017 Allergies as of 3/14/2018  Review Complete On: 3/14/2018 By: Thanh Rodriguez LPN No Known Allergies Current Immunizations  Reviewed on 6/15/2016 No immunizations on file. Not reviewed this visit You Were Diagnosed With   
  
 Codes Comments Essential hypertension    -  Primary ICD-10-CM: I10 
ICD-9-CM: 401.9 Vitamin D deficiency     ICD-10-CM: E55.9 ICD-9-CM: 268.9 Hypercalcemia     ICD-10-CM: M65.75 
ICD-9-CM: 275.42 Left hip pain     ICD-10-CM: M25.552 ICD-9-CM: 719.45 IFG (impaired fasting glucose)     ICD-10-CM: R73.01 
ICD-9-CM: 790.21 Vitals BP Pulse Temp Resp Height(growth percentile) Weight(growth percentile) 149/86 64 97.6 °F (36.4 °C) 16 5' 6\" (1.676 m) 175 lb (79.4 kg) LMP SpO2 BMI OB Status Smoking Status 08/10/2013 100% 28.25 kg/m2 Hysterectomy Former Smoker Vitals History BMI and BSA Data Body Mass Index Body Surface Area  
 28.25 kg/m 2 1.92 m 2 Preferred Pharmacy Pharmacy Name Phone [facility] @ [address] [facility], [address] [place] [address] Najma Figueroa [phone] Your Updated Medication List  
  
   
This list is accurate as of 3/14/18 11:40 AM.  Always use your most recent med list.  
  
  
  
  
 clindamycin 1 % external solution Commonly known as:  CLEOCIN T  
use thin film on affected area BID  
  
 doxycycline 100 mg capsule Commonly known as:  Joyce Phenes TAKE ONE CAPSULE BY Twice daily  
  
 levothyroxine 125 mcg tablet Commonly known as:  SYNTHROID  
TAKE ONE TABLET BY MOUTH EVERY DAY BEFORE BREAKFAST  
  
 lisinopril 20 mg tablet Commonly known as:  Latrice Cockayne Take 1 Tab by mouth daily. methylPREDNISolone 4 mg tablet Commonly known as:  Estevan Bough Take as directed by the package instructions. multivitamin tablet Commonly known as:  ONE A DAY Take 1 Tab by mouth daily. Indications: VITAMIN DEFICIENCY PREVENTION  
  
 simvastatin 20 mg tablet Commonly known as:  ZOCOR  
TAKE ONE TABLET BY MOUTH NIGHTLY Prescriptions Sent to Pharmacy Refills  
 lisinopril (PRINIVIL, ZESTRIL) 20 mg tablet 3 Sig: Take 1 Tab by mouth daily. Class: Normal  
 Pharmacy: [facility] @ [address] [place] [address] Ph #: [phone] Route: Oral  
 methylPREDNISolone (MEDROL DOSEPACK) 4 mg tablet 0 Sig: Take as directed by the package instructions. Class: Normal  
 Pharmacy: Clare Cira @ [address] [place] [address] Ph #: [phone] Follow-up Instructions Return in about 4 weeks (around 4/11/2018) for BP check. To-Do List   
 Around 03/14/2018 Lab:  HEMOGLOBIN A1C W/O EAG Around 03/14/2018 Lab:  METABOLIC PANEL, BASIC   
  
 03/14/2018 Lab:  PTH INTACT   
  
 03/14/2018 Lab:  VITAMIN D, 25 HYDROXY Patient Instructions Body Mass Index: Care Instructions Your Care Instructions Body mass index (BMI) can help you see if your weight is raising your risk for health problems. It uses a formula to compare how much you weigh with how tall you are. · A BMI lower than 18.5 is considered underweight. · A BMI between 18.5 and 24.9 is considered healthy. · A BMI between 25 and 29.9 is considered overweight. A BMI of 30 or higher is considered obese. If your BMI is in the normal range, it means that you have a lower risk for weight-related health problems. If your BMI is in the overweight or obese range, you may be at increased risk for weight-related health problems, such as high blood pressure, heart disease, stroke, arthritis or joint pain, and diabetes. If your BMI is in the underweight range, you may be at increased risk for health problems such as fatigue, lower protection (immunity) against illness, muscle loss, bone loss, hair loss, and hormone problems. BMI is just one measure of your risk for weight-related health problems. You may be at higher risk for health problems if you are not active, you eat an unhealthy diet, or you drink too much alcohol or use tobacco products. Follow-up care is a key part of your treatment and safety. Be sure to make and go to all appointments, and call your doctor if you are having problems. It's also a good idea to know your test results and keep a list of the medicines you take. How can you care for yourself at home? · Practice healthy eating habits. This includes eating plenty of fruits, vegetables, whole grains, lean protein, and low-fat dairy. · If your doctor recommends it, get more exercise. Walking is a good choice. Bit by bit, increase the amount you walk every day. Try for at least 30 minutes on most days of the week. · Do not smoke. Smoking can increase your risk for health problems. If you need help quitting, talk to your doctor about stop-smoking programs and medicines. These can increase your chances of quitting for good. · Limit alcohol to 2 drinks a day for men and 1 drink a day for women. Too much alcohol can cause health problems. If you have a BMI higher than 25 · Your doctor may do other tests to check your risk for weight-related health problems. This may include measuring the distance around your waist. A waist measurement of more than 40 inches in men or 35 inches in women can increase the risk of weight-related health problems. · Talk with your doctor about steps you can take to stay healthy or improve your health. You may need to make lifestyle changes to lose weight and stay healthy, such as changing your diet and getting regular exercise. If you have a BMI lower than 18.5 · Your doctor may do other tests to check your risk for health problems. · Talk with your doctor about steps you can take to stay healthy or improve your health. You may need to make lifestyle changes to gain or maintain weight and stay healthy, such as getting more healthy foods in your diet and doing exercises to build muscle. Where can you learn more? Go to http://ean-brandon.info/. Enter S176 in the search box to learn more about \"Body Mass Index: Care Instructions. \" Current as of: October 13, 2016 Content Version: 11.4 © 1631-7229 [employer] Care instructions adapted under license by [employer] (which disclaims liability or warranty for this information). If you have questions about a medical condition or this instruction, always ask your healthcare professional. [address] any warranty or liability for your use of this information. Introducing [facility]! Dear Ellie Hollins: Thank you for requesting a Gigturn account. Our records indicate that you already have an active Gigturn account. You can access your account anytime at [email or web address] Did you know that you can access your hospital and ER discharge instructions at any time in Mercaux? You can also review all of your test results from your hospital stay or ER visit. Additional Information If you have questions, please visit the Frequently Asked Questions section of the Mercaux website at [email or web address] Remember, Mercaux is NOT to be used for urgent needs. For medical emergencies, dial 911. Now available from your iPhone and Android! Please provide this summary of care documentation to your next provider. Your primary care clinician is listed as Peace Vanegas. If you have any questions after today's visit, please call [phone].

## 2018-03-14 NOTE — PROGRESS NOTES
[facility]:  3/14/2018, MRN: [id number]      Lorene Sims is a 56 y.o. female and presents to clinic for Blood Pressure Check    Subjective: The pt is a 52yo female with h/o HTN, hypothyroidism, [place] CMO, mitral valve prolapse, GERD, thyroid nodules, HLD, and acne vulgaris. 1. Hypothyroidism: The patient has had hypothyroidism for at least 6 months. She is on Synthroid and her most recent TFTs are unremarkable. Since her last appointment, she had lab work. She was also followed by Dr. Misbah Daley in the past given underlying thyroid nodules. 2. Hypertension: The patient's most recent lab work reveals underlying microalbuminuria. Her BMP is unremarkable except for a slightly elevated calcium at 10.4. She was on lisinopril-hydrochlorothiazide. Her blood pressure at her last appointment was extremely low in this medication. It was 95/56. She also had complaint of dizziness. Her pressure today is elevated at 149/86 without any medication. BP Readings from Last 3 Encounters:   03/14/18 149/86   02/28/18 95/56   09/18/17 165/85     3. Neck Pain: She was referred at her last apt to ENT and prescribed amoxicillin for symptoms concerning for mastoiditis/sialadenitis. At her last appointment, she reported left ear and jaw pain. No alleviating factors were known. She also reported having had a Panorex x-ray done at her dentist office that was unremarkable earlier this year. Her sx resolved s/p amoxicillin course. She soon after developed sx of a vaginal yeast infection and took OTC yeast infection rx. Vaginal discharge symptoms resolved thereafter. Today, she reports no pain along her jaw or neck. 4.  Obesity: Her ten-year CVD risk per the ACC/AHA risk assessment is 0.7%. Her weight is 175 pounds. She is not exercising regularly. She is not dieting. Her most recent BMP showed a mildly elevated BP of 111.  Although an A1C was ordered, the specimen per [employer] labs, could not be processed for some reason. 5.  Left hip pain: The patient fell down a week before her last appointment. She has had left-sided hip pain. Pain initially improved but is not resolving. Lying down certain way and walking can worsen her symptoms. Symptoms are not present at rest.    6.  Vitamin D deficiency: Per her most recent labs, her calcium is mildly elevated at 10.4. Per the patient, she has a history of vitamin D deficiency. The patient is not taking any over-the-counter vitamin D at present. 7.  Health maintenance:  -The patient had a Pap smear within the past 2 years. Per her history, it was unremarkable. She has never had an abnormal Pap smear.  -We do not have cardiology records.  -Per patient, she she is in menopause. She has not had a menses in at least 3 years. Patient Active Problem List    Diagnosis Date Noted    Acquired hypothyroidism 02/28/2018    Essential hypertension 02/28/2018    GERD (gastroesophageal reflux disease) 02/28/2018    Hyperlipidemia 02/28/2018    Acne vulgaris 02/28/2018    Mitral valve prolapse 02/28/2018    IFG (impaired fasting glucose) 02/28/2018       Current Outpatient Prescriptions   Medication Sig Dispense Refill    lisinopril (PRINIVIL, ZESTRIL) 20 mg tablet Take 1 Tab by mouth daily. 90 Tab 3    methylPREDNISolone (MEDROL DOSEPACK) 4 mg tablet Take as directed by the package instructions. 1 Dose Pack 0    simvastatin (ZOCOR) 20 mg tablet TAKE ONE TABLET BY MOUTH NIGHTLY 90 Tab 1    levothyroxine (SYNTHROID) 125 mcg tablet TAKE ONE TABLET BY MOUTH EVERY DAY BEFORE BREAKFAST 90 Tab 1    clindamycin (CLEOCIN T) 1 % external solution use thin film on affected area  mL 5    multivitamin (ONE A DAY) tablet Take 1 Tab by mouth daily.  Indications: VITAMIN DEFICIENCY PREVENTION         No Known Allergies    Past Medical History:   Diagnosis Date    GERD (gastroesophageal reflux disease)     Mitral valve prolapse     Other ill-defined conditions(799.89)     mitral valve prolapse    Thyroid disease     hypo       Past Surgical History:   Procedure Laterality Date    HX GYN      ov cyst    HX GYN      d & c's    HX OTHER SURGICAL         Family History   Problem Relation Age of Onset    Thyroid Disease Mother     Heart Disease Father     Hypertension Father     Diabetes Father     Cataract Brother        Social History   Substance Use Topics    Smoking status: Former Smoker    Smokeless tobacco: Never Used    Alcohol use No       ROS   Review of Systems   Constitutional: Negative for chills and fever. HENT: Negative for ear pain and sore throat. Eyes: Negative for blurred vision and pain. Respiratory: Negative for cough and shortness of breath. Cardiovascular: Negative for chest pain. Gastrointestinal: Negative for abdominal pain, blood in stool and melena. Genitourinary: Negative for dysuria and hematuria. Musculoskeletal: Positive for joint pain. Negative for myalgias. Skin: Negative for rash. Neurological: Negative for tingling, focal weakness and headaches. Endo/Heme/Allergies: Does not bruise/bleed easily. Psychiatric/Behavioral: Negative for substance abuse. Objective     Vitals:    03/14/18 1055   BP: 149/86   Pulse: 64   Resp: 16   Temp: 97.6 °F (36.4 °C)   SpO2: 100%   Weight: 175 lb (79.4 kg)   Height: 5' 6\" (1.676 m)   PainSc:   0 - No pain   LMP: 08/10/2013       Physical Exam   Constitutional: She is oriented to person, place, and time and well-developed, well-nourished, and in no distress. HENT:   Head: Normocephalic and atraumatic. Right Ear: External ear normal.   Left Ear: External ear normal.   Nose: Nose normal.   Mouth/Throat: Oropharynx is clear and moist. No oropharyngeal exudate. Eyes: Conjunctivae and EOM are normal. Pupils are equal, round, and reactive to light. Right eye exhibits no discharge. Left eye exhibits no discharge. No scleral icterus. Neck: Neck supple. Cardiovascular: Normal rate, regular rhythm, normal heart sounds and intact distal pulses. Exam reveals no gallop and no friction rub. No murmur heard. Pulmonary/Chest: Effort normal and breath sounds normal. No respiratory distress. She has no wheezes. She has no rales. Abdominal: Soft. Bowel sounds are normal. She exhibits no distension. There is no tenderness. There is no rebound and no guarding. Musculoskeletal: She exhibits no edema or tenderness (BUE). She has good ROM along BLE except some diminished left hip flexion/extension/rotation ROM 2/2 pain. Lymphadenopathy:     She has no cervical adenopathy. Neurological: She is alert and oriented to person, place, and time. She exhibits normal muscle tone. Gait normal.   Skin: Skin is warm and dry. No erythema. Psychiatric: Affect normal.   Nursing note and vitals reviewed. LABS   Data Review:   Lab Results   Component Value Date/Time    WBC 5.1 08/19/2013 08:42 AM    HGB 13.8 08/19/2013 08:42 AM    HCT 39.8 08/19/2013 08:42 AM    PLATELET 631 58/72/6928 08:42 AM    MCV 90.5 08/19/2013 08:42 AM       Lab Results   Component Value Date/Time    Sodium 140 08/19/2013 08:42 AM    Potassium 4.0 08/19/2013 08:42 AM    Chloride 107 08/19/2013 08:42 AM    CO2 27 08/19/2013 08:42 AM    Anion gap 6 08/19/2013 08:42 AM    Glucose 133 (H) 08/19/2013 08:42 AM    BUN 9 08/19/2013 08:42 AM    Creatinine 0.89 08/19/2013 08:42 AM    BUN/Creatinine ratio 10 (L) 08/19/2013 08:42 AM    GFR est AA >60 08/19/2013 08:42 AM    GFR est non-AA >60 08/19/2013 08:42 AM    Calcium 8.6 08/19/2013 08:42 AM         Assessment/Plan:   1. Essential hypertension:   -Ordering lisinopril 20 mg daily. Return to clinic in 4 weeks to assess her blood pressure. If stable, I will have her return to clinic in 6 month intervals for blood pressure checks. ORDERS:  - lisinopril (PRINIVIL, ZESTRIL) 20 mg tablet; Take 1 Tab by mouth daily. Dispense: 90 Tab; Refill: 3    2.  Vitamin D deficiency:   -Checking a vitamin D level, PTH, and a BMP given a mildly elevated calcium on her last BMP and history of vitamin D deficiency per her history. ORDERS:  - VITAMIN D, 25 HYDROXY; Future  - PTH INTACT; Future  - METABOLIC PANEL, BASIC; Future    3. Left hip pain: From DJD? Bursitis? -Ordering a Medrol Dosepak. If the patient continues to have pain, I will need to order a left hip x-ray series. This was discussed with her today. I would also refer her to orthopedics for additional evaluation. ORDERS:  - methylPREDNISolone (MEDROL DOSEPACK) 4 mg tablet; Take as directed by the package instructions. Dispense: 1 Dose Pack; Refill: 0    4. IFG (impaired fasting glucose):   -Ordering an A1c to screen for diabetes. ORDERS:  - HEMOGLOBIN A1C W/O EAG; Future    5. Health maintenance:  -I am requesting her last gynecology records, cardiology records, and last Pap smear. Health Maintenance Due   Topic Date Due    DTaP/Tdap/Td series (1 - Tdap) 05/24/1992    PAP AKA CERVICAL CYTOLOGY  07/17/2015    Influenza Age 5 to Adult  08/01/2017     Lab review: labs are reviewed in the EHR and ordered as mentioned above    I have discussed the diagnosis with the patient and the intended plan as seen in the above orders. The patient has received an after-visit summary and questions were answered concerning future plans. I have discussed medication side effects and warnings with the patient as well. I have reviewed the plan of care with the patient, accepted their input and they are in agreement with the treatment goals. All questions were answered. The patient understands the plan of care. Handouts provided today with above information. Pt instructed if symptoms worsen to call the office or report to the ED for continued care. Greater than 50% of the visit time was spent in counseling and/or coordination of care.         Follow-up Disposition:  Return in about 4 weeks (around 4/11/2018) for BP check.    Radha Beauchamp MD

## 2018-03-14 NOTE — PATIENT INSTRUCTIONS
Body Mass Index: Care Instructions  Your Care Instructions    Body mass index (BMI) can help you see if your weight is raising your risk for health problems. It uses a formula to compare how much you weigh with how tall you are. · A BMI lower than 18.5 is considered underweight. · A BMI between 18.5 and 24.9 is considered healthy. · A BMI between 25 and 29.9 is considered overweight. A BMI of 30 or higher is considered obese. If your BMI is in the normal range, it means that you have a lower risk for weight-related health problems. If your BMI is in the overweight or obese range, you may be at increased risk for weight-related health problems, such as high blood pressure, heart disease, stroke, arthritis or joint pain, and diabetes. If your BMI is in the underweight range, you may be at increased risk for health problems such as fatigue, lower protection (immunity) against illness, muscle loss, bone loss, hair loss, and hormone problems. BMI is just one measure of your risk for weight-related health problems. You may be at higher risk for health problems if you are not active, you eat an unhealthy diet, or you drink too much alcohol or use tobacco products. Follow-up care is a key part of your treatment and safety. Be sure to make and go to all appointments, and call your doctor if you are having problems. It's also a good idea to know your test results and keep a list of the medicines you take. How can you care for yourself at home? · Practice healthy eating habits. This includes eating plenty of fruits, vegetables, whole grains, lean protein, and low-fat dairy. · If your doctor recommends it, get more exercise. Walking is a good choice. Bit by bit, increase the amount you walk every day. Try for at least 30 minutes on most days of the week. · Do not smoke. Smoking can increase your risk for health problems. If you need help quitting, talk to your doctor about stop-smoking programs and medicines. These can increase your chances of quitting for good. · Limit alcohol to 2 drinks a day for men and 1 drink a day for women. Too much alcohol can cause health problems. If you have a BMI higher than 25  · Your doctor may do other tests to check your risk for weight-related health problems. This may include measuring the distance around your waist. A waist measurement of more than 40 inches in men or 35 inches in women can increase the risk of weight-related health problems. · Talk with your doctor about steps you can take to stay healthy or improve your health. You may need to make lifestyle changes to lose weight and stay healthy, such as changing your diet and getting regular exercise. If you have a BMI lower than 18.5  · Your doctor may do other tests to check your risk for health problems. · Talk with your doctor about steps you can take to stay healthy or improve your health. You may need to make lifestyle changes to gain or maintain weight and stay healthy, such as getting more healthy foods in your diet and doing exercises to build muscle. Where can you learn more? Go to http://ean-brandon.info/. Enter S176 in the search box to learn more about \"Body Mass Index: Care Instructions. \"  Current as of: October 13, 2016  Content Version: 11.4  © 5098-2548 [employer]. Care instructions adapted under license by [employer] (which disclaims liability or warranty for this information). If you have questions about a medical condition or this instruction, always ask your healthcare professional. [address] any warranty or liability for your use of this information.

## 2018-03-21 ENCOUNTER — TELEPHONE (OUTPATIENT)
Dept: INTERNAL MEDICINE CLINIC | Age: 48
End: 2018-03-21

## 2018-03-21 NOTE — TELEPHONE ENCOUNTER
Please let the pt know that her BMP is normal. Her calcium level is normal. Her labs do not show any evidence of diabetes or prediabetes.     Dr. Peace Vanegas  [facility] [address], [address] [place] [address] Str.  Phone: [phone]  Fax: [phone]

## 2018-03-23 ENCOUNTER — TELEPHONE (OUTPATIENT)
Dept: INTERNAL MEDICINE CLINIC | Age: 48
End: 2018-03-23

## 2018-03-23 DIAGNOSIS — M25.552 LEFT HIP PAIN: Primary | ICD-10-CM

## 2018-03-23 NOTE — TELEPHONE ENCOUNTER
Patient is calling to let you know that the bursitis is maybe a little better but not much. She finished the steroid on either Monday or Tuesday. She doesn't know what she should do at this point. Please advise.

## 2018-03-23 NOTE — TELEPHONE ENCOUNTER
Chief Complaint   Patient presents with    Referral Follow Up     per Dr Shavonne Jesus patient informed she has a Referral placed to see Orthopedic Specialist for further evaluation and Treatment     Patient made aware that Dr Shavonne Jesus placed a Referral to see Orthopedic Specialist, and we ask that she give them 3 business days to contact her direct to schedule her appointment. The patient understands if she has not heard from the Orthopedic Specialist at the end of that time, she can call back here to see the Status of her Referral, and at that time get a contact number for her to contact them direct to schedule her appointment. All understood.

## 2018-03-29 ENCOUNTER — OFFICE VISIT (OUTPATIENT)
Dept: ORTHOPEDIC SURGERY | Age: 48
End: 2018-03-29

## 2018-03-29 VITALS
BODY MASS INDEX: 28.51 KG/M2 | RESPIRATION RATE: 16 BRPM | OXYGEN SATURATION: 99 % | WEIGHT: 177.4 LBS | SYSTOLIC BLOOD PRESSURE: 116 MMHG | DIASTOLIC BLOOD PRESSURE: 64 MMHG | HEART RATE: 71 BPM | HEIGHT: 66 IN | TEMPERATURE: 97.9 F

## 2018-03-29 DIAGNOSIS — S70.02XA CONTUSION OF LEFT HIP, INITIAL ENCOUNTER: ICD-10-CM

## 2018-03-29 DIAGNOSIS — M25.552 PAIN OF LEFT HIP JOINT: Primary | ICD-10-CM

## 2018-03-29 NOTE — MR AVS SNAPSHOT
[address], [address] 200 [facility] 
[phone] Patient: Madelin Dailey MRN: [id number] Visit Information Date & Time Provider Department Dept. Phone Encounter #  
 3/29/2018  2:00 PM Diamond Alvarado  [facility], [address] and [facility] [phone] Your Appointments 4/11/2018 11:45 AM  
Office Visit with Rosalino De La Cruz MD  
[facility] [facility]) Appt Note: ov per [facility] [address], [address] [place] [address] [address]  
  
   
 [address], [address] Upcoming Health Maintenance Date Due DTaP/Tdap/Td series (1 - Tdap) 5/24/1992 PAP AKA CERVICAL CYTOLOGY 7/17/2015 Influenza Age 5 to Adult 8/1/2017 Allergies as of 3/29/2018  Review Complete On: 3/29/2018 By: Diamond Alvarado MD  
 No Known Allergies Current Immunizations  Reviewed on 6/15/2016 No immunizations on file. Not reviewed this visit You Were Diagnosed With   
  
 Codes Comments Pain of left hip joint    -  Primary ICD-10-CM: M25.552 ICD-9-CM: 719.45 Vitals BP Pulse Temp Resp Height(growth percentile) Weight(growth percentile) 116/64 71 97.9 °F (36.6 °C) (Oral) 16 5' 6\" (1.676 m) 177 lb 6.4 oz (80.5 kg) LMP SpO2 BMI OB Status Smoking Status 08/10/2013 99% 28.63 kg/m2 Hysterectomy Former Smoker BMI and BSA Data Body Mass Index Body Surface Area  
 28.63 kg/m 2 1.94 m 2 Preferred Pharmacy Pharmacy Name Phone Clare Denis @ [address] [address] [address] [address] [phone] Your Updated Medication List  
  
   
This list is accurate as of 3/29/18  2:29 PM.  Always use your most recent med list.  
  
  
  
  
 clindamycin 1 % external solution Commonly known as:  CLEOCIN T  
use thin film on affected area BID  
  
 levothyroxine 125 mcg tablet Commonly known as:  SYNTHROID  
TAKE ONE TABLET BY MOUTH EVERY DAY BEFORE BREAKFAST  
  
 lisinopril 20 mg tablet Commonly known as:  Loralie Dia Take 1 Tab by mouth daily. methylPREDNISolone 4 mg tablet Commonly known as:  Nilesh Marina Take as directed by the package instructions. multivitamin tablet Commonly known as:  ONE A DAY Take 1 Tab by mouth daily. Indications: VITAMIN DEFICIENCY PREVENTION  
  
 simvastatin 20 mg tablet Commonly known as:  ZOCOR  
TAKE ONE TABLET BY MOUTH NIGHTLY We Performed the Following AMB POC X-RAY RADEX HIP UNI WITH PELVIS 2-3 VIEWS [29460 CPT(R)] Introducing [facility] SERVICES! Dear Dianne Daily: Thank you for requesting a Medius account. Our records indicate that you already have an active Medius account. You can access your account anytime at [email or web address] Did you know that you can access your hospital and ER discharge instructions at any time in Medius? You can also review all of your test results from your hospital stay or ER visit. Additional Information If you have questions, please visit the Frequently Asked Questions section of the Medius website at [email or web address] Remember, Medius is NOT to be used for urgent needs. For medical emergencies, dial 911. Now available from your iPhone and Android! Please provide this summary of care documentation to your next provider. Your primary care clinician is listed as Peace Vanegas. If you have any questions after today's visit, please call [phone].

## 2018-03-29 NOTE — PROGRESS NOTES
HISTORY OF PRESENT ILLNESS: Ms. Paulette Gabriel is a 21-year-old female who is here for consultation regarding a left hip injury. About five weeks ago, on February 25, 2018, she was carrying some laundry up and she started to fall. She did not drop the laundry but she landed very hard on her left shoulder and the lateral aspect of her left hip. She was able to bear weight on the left leg following the injury. She has noticed continued discomfort along the left hip. Her shoulder is feeling better now and she does not have pain there. She did take some Ibuprofen which helped her pain. She saw her family physician which told her she may have a bursitis, but asked her not to take the anti-inflammatory medications because of ulcers, long-term. She has no history of gastric ulcers. She has not had x-rays of her hip. She does not utilize any ambulatory assist. She does not notice a leg length discrepancy. She cannot sit on the floor with her grandchildren right now and sit [facility] style because this aggravates her symptoms. She also points to some pain in the region of the left buttock area. She denies radiating pain. She denies numbness or tingling in the lower extremities. She denies bladder or bowel dysfunction. She also denies back pain. PHYSICAL EXAMINATION:  Reveals a slightly overweight, 21-year-old female in mild discomfort. She currently does ambulate with a minimal antalgic gait. She is not utilizing any ambulatory assist.  She is alert and oriented x 3 and answers questions appropriately. With reference to her right hip, she has no palpable tenderness over her right greater trochanter. Her right hip examination is completely benign. Straight leg raise test is negative. Elkins test is negative. She has normal active and passive range of motion of the right hip without discomfort. Right hip roll test is negative.       The left hip examination reveals she is able to straight leg raise to about 70º and this does reproduce some pain in the posterior aspect of her hip and lateral aspect of her left hip. She has fairly good passive and active range of motion her left hip but does guard it slightly. Her pain is aggravated by resisted abduction of the left hip. Leg lengths are symmetrical in the supine position. Neurovascular testing is intact to the lower extremities, proximally and distally. RADIOGRAPHS:  X-rays of her pelvis and left hip today do no reveal any obvious acute osseus pathology. Specifically looking at the greater trochanter I do not see any fracture lines or radiolucencies. The trabeculae remain intact. The joint space is well preserved in the weightbearing portion of her left hip. IMPRESSION: Left hip greater trochanteric contusion, left hip. RECOMMENDATIONS:  Treatment still remains symptomatic and conservative. I think she can take an occasional Ibuprofen as necessary for discomfort. She will not be on this long-term. I have told her it may take three to four months for this to get better. She was doing an exercise program, but she cannot do all of this exercises now. She may do some low-impact exercise but actually swimming or water exercises will be the best for her at this point. Again treatment remains symptomatic and conservative. I will see her back in about four weeks. All of her questions were answered today.                  Vitals:    03/29/18 1354   BP: 116/64   Pulse: 71   Resp: 16   Temp: 97.9 °F (36.6 °C)   TempSrc: Oral   SpO2: 99%   Weight: 177 lb 6.4 oz (80.5 kg)   Height: 5' 6\" (1.676 m)   PainSc:   3   PainLoc: Hip   LMP: 08/10/2013       Patient Active Problem List   Diagnosis Code    Acquired hypothyroidism E03.9    Essential hypertension I10    GERD (gastroesophageal reflux disease) K21.9    Hyperlipidemia E78.5    Acne vulgaris L70.0    Mitral valve prolapse I34.1    IFG (impaired fasting glucose) R73.01 Patient Active Problem List    Diagnosis Date Noted    Acquired hypothyroidism 02/28/2018    Essential hypertension 02/28/2018    GERD (gastroesophageal reflux disease) 02/28/2018    Hyperlipidemia 02/28/2018    Acne vulgaris 02/28/2018    Mitral valve prolapse 02/28/2018    IFG (impaired fasting glucose) 02/28/2018     Current Outpatient Prescriptions   Medication Sig Dispense Refill    lisinopril (PRINIVIL, ZESTRIL) 20 mg tablet Take 1 Tab by mouth daily. 90 Tab 3    simvastatin (ZOCOR) 20 mg tablet TAKE ONE TABLET BY MOUTH NIGHTLY 90 Tab 1    levothyroxine (SYNTHROID) 125 mcg tablet TAKE ONE TABLET BY MOUTH EVERY DAY BEFORE BREAKFAST 90 Tab 1    clindamycin (CLEOCIN T) 1 % external solution use thin film on affected area  mL 5    multivitamin (ONE A DAY) tablet Take 1 Tab by mouth daily. Indications: VITAMIN DEFICIENCY PREVENTION      methylPREDNISolone (MEDROL DOSEPACK) 4 mg tablet Take as directed by the package instructions.  1 Dose Pack 0     No Known Allergies  Past Medical History:   Diagnosis Date    GERD (gastroesophageal reflux disease)     Mitral valve prolapse     Other ill-defined conditions(799.89)     mitral valve prolapse    Thyroid disease     hypo     Past Surgical History:   Procedure Laterality Date    HX GYN      ov cyst    HX GYN      d & c's    HX OTHER SURGICAL       Family History   Problem Relation Age of Onset    Thyroid Disease Mother     Heart Disease Father     Hypertension Father     Diabetes Father     Cataract Brother      Social History   Substance Use Topics    Smoking status: Former Smoker    Smokeless tobacco: Never Used    Alcohol use Yes      Comment: occasionally

## 2018-03-31 DIAGNOSIS — R73.01 IFG (IMPAIRED FASTING GLUCOSE): ICD-10-CM

## 2018-03-31 DIAGNOSIS — M54.2 NECK PAIN: ICD-10-CM

## 2018-03-31 DIAGNOSIS — E03.9 ACQUIRED HYPOTHYROIDISM: ICD-10-CM

## 2018-03-31 DIAGNOSIS — I10 ESSENTIAL HYPERTENSION: ICD-10-CM

## 2018-04-10 DIAGNOSIS — E83.52 HYPERCALCEMIA: ICD-10-CM

## 2018-04-10 DIAGNOSIS — R73.01 IFG (IMPAIRED FASTING GLUCOSE): ICD-10-CM

## 2018-04-10 DIAGNOSIS — E55.9 VITAMIN D DEFICIENCY: ICD-10-CM

## 2018-04-11 ENCOUNTER — OFFICE VISIT (OUTPATIENT)
Dept: INTERNAL MEDICINE CLINIC | Age: 48
End: 2018-04-11

## 2018-04-11 VITALS
TEMPERATURE: 97.2 F | HEART RATE: 74 BPM | RESPIRATION RATE: 18 BRPM | HEIGHT: 66 IN | OXYGEN SATURATION: 98 % | SYSTOLIC BLOOD PRESSURE: 122 MMHG | WEIGHT: 174 LBS | DIASTOLIC BLOOD PRESSURE: 78 MMHG | BODY MASS INDEX: 27.97 KG/M2

## 2018-04-11 DIAGNOSIS — E55.9 VITAMIN D DEFICIENCY: Primary | ICD-10-CM

## 2018-04-11 DIAGNOSIS — E66.3 OVERWEIGHT: ICD-10-CM

## 2018-04-11 DIAGNOSIS — M25.552 LEFT HIP PAIN: ICD-10-CM

## 2018-04-11 RX ORDER — ERGOCALCIFEROL 1.25 MG/1
50000 CAPSULE ORAL
Qty: 4 CAP | Refills: 3 | Status: SHIPPED | OUTPATIENT
Start: 2018-04-11 | End: 2019-06-24 | Stop reason: ALTCHOICE

## 2018-04-11 NOTE — PATIENT INSTRUCTIONS
Learning About Vitamin D  Why is it important to get enough vitamin D? Your body needs vitamin D to absorb calcium. Calcium keeps your bones and muscles, including your heart, healthy and strong. If your muscles don't get enough calcium, they can cramp, hurt, or feel weak. You may have long-term (chronic) muscle aches and pains. If you don't get enough vitamin D throughout life, you have an increased chance of having thin and brittle bones (osteoporosis) in your later years. Children who don't get enough vitamin D may not grow as much as others their age. They also have a chance of getting a rare disease called rickets. It causes weak bones. Vitamin D and calcium are added to many foods. And your body uses sunshine to make its own vitamin D. How much vitamin D do you need? The [place] of [employer] recommends that people ages 3 through 79 get 600 IU (international units) every day. Adults 71 and older need 800 IU every day. Blood tests for vitamin D can check your vitamin D level. But there is no standard normal range used by all laboratories. The [place] of [employer] recommends a blood level of 20 ng/mL of vitamin D for healthy bones. And most people in the [place] and [facility] ([facility]) meet this goal.  How can you get more vitamin D? Foods that contain vitamin D include:  · [place], tuna, and mackerel. These are some of the best foods to eat when you need to get more vitamin D.  · Cheese, egg yolks, and beef liver. These foods have vitamin D in small amounts. · Milk, soy drinks, orange juice, yogurt, margarine, and some kinds of cereal have vitamin D added to them. Some people don't make vitamin D as well as others. They may have to take extra care in getting enough vitamin D. Things that reduce how much vitamin D your body makes include:  · Dark skin, such as many  Americans have. · Age, especially if you are older than 72. · Digestive problems, such as Crohn's or celiac disease.   · Liver and kidney disease. Some people who do not get enough vitamin D may need supplements. Are there any risks from taking vitamin D?  · Too much vitamin D:  ¨ Can damage your kidneys. ¨ Can cause nausea and vomiting, constipation, and weakness. ¨ Raises the amount of calcium in your blood. If this happens, you can get confused or have an irregular heart rhythm. · Vitamin D may interact with other medicines. Tell your doctor about all of the medicines you take, including over-the-counter drugs, herbs, and pills. Tell your doctor about all of your current medical problems. Where can you learn more? Go to http://eanKalidobrandon.info/. Enter [id number] in the search box to learn more about \"Learning About Vitamin D.\"  Current as of: May 12, 2017  Content Version: 11.4  © 2432-4561 Healthwise, Incorporated. Care instructions adapted under license by [employer] (which disclaims liability or warranty for this information). If you have questions about a medical condition or this instruction, always ask your healthcare professional. [address] any warranty or liability for your use of this information.

## 2018-04-11 NOTE — PROGRESS NOTES
[facility]:  4/11/2018, MRN: [id number]      Reyes Braun is a 56 y.o. female and presents to clinic for Results    Subjective: The pt is a 54yo female with h/o HTN, hypothyroidism, [place] CMO, mitral valve prolapse, GERD, thyroid nodules, HLD, and acne vulgaris. 1. Vitamin D Deficiency: Her vitamin D level was 23 when checked just last month. She is not on vitamin D supplementation at this time. Her calcium level on her left the labs is normal.  As result, her PTH was not checked by the lab company. 2. Overweight and Left Hip Contusion: The patient's weight today 174 pounds. Her weight is decreasing. She is working hard at dieting. She has been proceeding with activity as tolerated given a recent diagnosis of left hip contusion - which she suffered after falling down accidentally. The patient met with Laureano Claros in between apts. Conservative rx was recommended. The patient states that her pain is improving slowly as she has modified her daily routine appropriately given underlying left hip contusion. Wt Readings from Last 3 Encounters:   04/11/18 174 lb (78.9 kg)   03/29/18 177 lb 6.4 oz (80.5 kg)   03/14/18 175 lb (79.4 kg)       Patient Active Problem List    Diagnosis Date Noted    Acquired hypothyroidism 02/28/2018    Essential hypertension 02/28/2018    GERD (gastroesophageal reflux disease) 02/28/2018    Hyperlipidemia 02/28/2018    Acne vulgaris 02/28/2018    Mitral valve prolapse 02/28/2018    IFG (impaired fasting glucose) 02/28/2018       Current Outpatient Prescriptions   Medication Sig Dispense Refill    ergocalciferol (ERGOCALCIFEROL) 50,000 unit capsule Take 1 Cap by mouth every seven (7) days. 4 Cap 3    lisinopril (PRINIVIL, ZESTRIL) 20 mg tablet Take 1 Tab by mouth daily.  90 Tab 3    simvastatin (ZOCOR) 20 mg tablet TAKE ONE TABLET BY MOUTH NIGHTLY 90 Tab 1    levothyroxine (SYNTHROID) 125 mcg tablet TAKE ONE TABLET BY MOUTH EVERY DAY BEFORE BREAKFAST 90 Tab 1    clindamycin (CLEOCIN T) 1 % external solution use thin film on affected area  mL 5    multivitamin (ONE A DAY) tablet Take 1 Tab by mouth daily. Indications: VITAMIN DEFICIENCY PREVENTION         No Known Allergies    Past Medical History:   Diagnosis Date    GERD (gastroesophageal reflux disease)     Mitral valve prolapse     Other ill-defined conditions(799.89)     mitral valve prolapse    Thyroid disease     hypo       Past Surgical History:   Procedure Laterality Date    HX GYN      ov cyst    HX GYN      d & c's    HX OTHER SURGICAL         Family History   Problem Relation Age of Onset    Thyroid Disease Mother     Heart Disease Father     Hypertension Father     Diabetes Father     Cataract Brother        Social History   Substance Use Topics    Smoking status: Former Smoker    Smokeless tobacco: Never Used    Alcohol use Yes      Comment: occasionally       ROS   Review of Systems   Constitutional: Negative for chills and fever. HENT: Negative for ear pain and sore throat. Eyes: Negative for blurred vision and pain. Respiratory: Negative for cough and shortness of breath. Cardiovascular: Negative for chest pain. Gastrointestinal: Negative for abdominal pain, blood in stool and melena. Genitourinary: Negative for dysuria and hematuria. Musculoskeletal: Positive for joint pain. Negative for myalgias. Back pain: (left hip pain is improving)   Skin: Negative for rash. Neurological: Negative for tingling, focal weakness and headaches. Endo/Heme/Allergies: Does not bruise/bleed easily. Psychiatric/Behavioral: Negative for substance abuse.        Objective     Vitals:    04/11/18 1151   BP: 122/78   Pulse: 74   Resp: 18   Temp: 97.2 °F (36.2 °C)   SpO2: 98%   Weight: 174 lb (78.9 kg)   Height: 5' 6\" (1.676 m)   PainSc:   0 - No pain   LMP: 08/10/2013       Physical Exam   Constitutional: She is oriented to person, place, and time and well-developed, well-nourished, and in no distress. HENT:   Head: Normocephalic and atraumatic. Right Ear: External ear normal.   Left Ear: External ear normal.   Nose: Nose normal.   Mouth/Throat: Oropharynx is clear and moist. No oropharyngeal exudate. Eyes: Conjunctivae and EOM are normal. Pupils are equal, round, and reactive to light. Right eye exhibits no discharge. Left eye exhibits no discharge. No scleral icterus. Neck: Neck supple. Cardiovascular: Normal rate, regular rhythm, normal heart sounds and intact distal pulses. Exam reveals no gallop and no friction rub. No murmur heard. Pulmonary/Chest: Effort normal and breath sounds normal. No respiratory distress. She has no wheezes. She has no rales. Abdominal: Soft. Bowel sounds are normal. She exhibits no distension. Musculoskeletal: She exhibits no edema (BUE) or tenderness. Lymphadenopathy:     She has no cervical adenopathy. Neurological: She is alert and oriented to person, place, and time. She exhibits normal muscle tone. Gait normal.   Skin: Skin is warm and dry. No erythema. Psychiatric: Affect normal.   Nursing note and vitals reviewed. LABS   Data Review:   Lab Results   Component Value Date/Time    WBC 5.1 08/19/2013 08:42 AM    HGB 13.8 08/19/2013 08:42 AM    HCT 39.8 08/19/2013 08:42 AM    PLATELET 667 26/62/7769 08:42 AM    MCV 90.5 08/19/2013 08:42 AM       Lab Results   Component Value Date/Time    Sodium 140 08/19/2013 08:42 AM    Potassium 4.0 08/19/2013 08:42 AM    Chloride 107 08/19/2013 08:42 AM    CO2 27 08/19/2013 08:42 AM    Anion gap 6 08/19/2013 08:42 AM    Glucose 133 (H) 08/19/2013 08:42 AM    BUN 9 08/19/2013 08:42 AM    Creatinine 0.89 08/19/2013 08:42 AM    BUN/Creatinine ratio 10 (L) 08/19/2013 08:42 AM    GFR est AA >60 08/19/2013 08:42 AM    GFR est non-AA >60 08/19/2013 08:42 AM    Calcium 8.6 08/19/2013 08:42 AM         Assessment/Plan:   1.  Vitamin D deficiency: Level is 21  -I will recheck a vitamin D level just before her follow-up appointment in 6 months.  -The patient was prescribed prescription strength vitamin D.  I instructed her to take over-the-counter vitamin D thereafter. ORDERS:  - VITAMIN D, 25 HYDROXY; Future  - ergocalciferol (ERGOCALCIFEROL) 50,000 unit capsule; Take 1 Cap by mouth every seven (7) days. Dispense: 4 Cap; Refill: 3    2. General:   - I encouraged the patient to continue with her weight loss journey. I encouraged her to continue making healthier food options and controlling her portions. I will recheck her weight at her follow-up appointment. I encouraged her to continue with activity as tolerated given her left hip contusion per 's instructions/recommendations.  - Requesting a copy of her last PAP. Health Maintenance Due   Topic Date Due    DTaP/Tdap/Td series (1 - Tdap) 05/24/1992    PAP AKA CERVICAL CYTOLOGY  07/17/2015    Influenza Age 5 to Adult  08/01/2017     Lab review: labs are reviewed in the EHR    I have discussed the diagnosis with the patient and the intended plan as seen in the above orders. The patient has received an after-visit summary and questions were answered concerning future plans. I have discussed medication side effects and warnings with the patient as well. I have reviewed the plan of care with the patient, accepted their input and they are in agreement with the treatment goals. All questions were answered. The patient understands the plan of care. Handouts provided today with above information. Pt instructed if symptoms worsen to call the office or report to the ED for continued care. Greater than 50% of the visit time was spent in counseling and/or coordination of care. Follow-up Disposition:  Return in about 6 months (around 10/11/2018) for BP check, weight check.     Addi Jackson MD

## 2018-04-11 NOTE — PROGRESS NOTES
1. Have you been to the ER, urgent care clinic since your last visit? Hospitalized since your last visit? No    2. Have you seen or consulted any other health care providers outside of the [address] [place] [address] since your last visit? Include any pap smears or colon screening.  Yes Reason for visit: ortho

## 2018-05-01 DIAGNOSIS — E03.9 ACQUIRED HYPOTHYROIDISM: Primary | ICD-10-CM

## 2018-05-01 DIAGNOSIS — E78.00 PURE HYPERCHOLESTEROLEMIA: ICD-10-CM

## 2018-05-01 RX ORDER — SIMVASTATIN 20 MG/1
TABLET, FILM COATED ORAL
Qty: 90 TAB | Refills: 3 | Status: SHIPPED | OUTPATIENT
Start: 2018-05-01 | End: 2019-05-16 | Stop reason: SDUPTHER

## 2018-05-01 RX ORDER — LEVOTHYROXINE SODIUM 125 UG/1
TABLET ORAL
Qty: 90 TAB | Refills: 3 | Status: SHIPPED | OUTPATIENT
Start: 2018-05-01 | End: 2019-05-16 | Stop reason: SDUPTHER

## 2018-05-02 ENCOUNTER — TELEPHONE (OUTPATIENT)
Dept: INTERNAL MEDICINE CLINIC | Age: 48
End: 2018-05-02

## 2018-05-02 DIAGNOSIS — L73.2 SUPPURATIVE HIDRADENITIS: ICD-10-CM

## 2018-05-02 RX ORDER — DOXYCYCLINE 100 MG/1
CAPSULE ORAL
Qty: 60 CAP | Refills: 3 | Status: SHIPPED | OUTPATIENT
Start: 2018-05-02 | End: 2018-11-02 | Stop reason: SDUPTHER

## 2018-05-02 NOTE — TELEPHONE ENCOUNTER
Rx sent in.     Dr. Prem Jonas  [facility], [address] [facility], [address]  Phone: [phone]  Fax: [phone]

## 2018-11-02 ENCOUNTER — OFFICE VISIT (OUTPATIENT)
Dept: INTERNAL MEDICINE CLINIC | Age: 48
End: 2018-11-02

## 2018-11-02 VITALS
SYSTOLIC BLOOD PRESSURE: 122 MMHG | TEMPERATURE: 98 F | HEART RATE: 80 BPM | HEIGHT: 66 IN | RESPIRATION RATE: 12 BRPM | OXYGEN SATURATION: 98 % | DIASTOLIC BLOOD PRESSURE: 78 MMHG | WEIGHT: 157.2 LBS | BODY MASS INDEX: 25.26 KG/M2

## 2018-11-02 DIAGNOSIS — L71.9 ROSACEA: ICD-10-CM

## 2018-11-02 DIAGNOSIS — E03.9 ACQUIRED HYPOTHYROIDISM: ICD-10-CM

## 2018-11-02 DIAGNOSIS — L73.2 SUPPURATIVE HIDRADENITIS: ICD-10-CM

## 2018-11-02 DIAGNOSIS — L98.9 SKIN LESION OF RIGHT LEG: ICD-10-CM

## 2018-11-02 DIAGNOSIS — H53.8 BLURRY VISION, BILATERAL: ICD-10-CM

## 2018-11-02 DIAGNOSIS — R73.01 IFG (IMPAIRED FASTING GLUCOSE): ICD-10-CM

## 2018-11-02 DIAGNOSIS — E78.5 HYPERLIPIDEMIA, UNSPECIFIED HYPERLIPIDEMIA TYPE: ICD-10-CM

## 2018-11-02 DIAGNOSIS — E55.9 VITAMIN D DEFICIENCY: ICD-10-CM

## 2018-11-02 DIAGNOSIS — I10 ESSENTIAL HYPERTENSION: Primary | ICD-10-CM

## 2018-11-02 RX ORDER — DOXYCYCLINE 100 MG/1
CAPSULE ORAL
Qty: 60 CAP | Refills: 11 | Status: SHIPPED | OUTPATIENT
Start: 2018-11-02 | End: 2019-05-16 | Stop reason: SDUPTHER

## 2018-11-02 NOTE — ACP (ADVANCE CARE PLANNING)
Patient was given a copy of the Advanced Medical Directive Form, and understands to bring it in once completed.

## 2018-11-02 NOTE — PATIENT INSTRUCTIONS
Patient was given a copy of the Advanced Medical Directive Form, and understands to bring it in once completed. Health Maintenance Due   Topic Date Due    PAP AKA CERVICAL CYTOLOGY  07/17/2015    Influenza Age 5 to Adult  08/01/2018          Home Blood Pressure Test: About This Test  What is it? A home blood pressure test allows you to keep track of your blood pressure at home. Blood pressure is a measure of the force of blood against the walls of your arteries. Blood pressure readings include two numbers, such as 130/80 (say \"130 over 80\"). The first number is the systolic pressure. The second number is the diastolic pressure. Why is this test done? You may do this test at home to:  · Find out if you have high blood pressure. · Track your blood pressure if you have high blood pressure. · Track how well medicine is working to reduce high blood pressure. · Check how lifestyle changes, such as weight loss and exercise, are affecting blood pressure. How can you prepare for the test?  · Do not use caffeine, tobacco, or medicines known to raise blood pressure (such as nasal decongestant sprays) for at least 30 minutes before taking your blood pressure. · Do not exercise for at least 30 minutes before taking your blood pressure. What happens before the test?  Take your blood pressure while you feel comfortable and relaxed. Sit quietly with both feet on the floor for at least 5 minutes before the test.  What happens during the test?  · Sit with your arm slightly bent and resting on a table so that your upper arm is at the same level as your heart. · Roll up your sleeve or take off your shirt to expose your upper arm. · Wrap the blood pressure cuff around your upper arm so that the lower edge of the cuff is about 1 inch above the bend of your elbow. Proceed with the following steps depending on if you are using an automatic or manual pressure monitor.   Automatic blood pressure monitors  · Press the on/off button on the automatic monitor and wait until the ready-to-measure \"heart\" symbol appears next to zero in the display window. · Press the start button. The cuff will inflate and deflate by itself. · Your blood pressure numbers will appear on the screen. · Write your numbers in your log book, along with the date and time. Manual blood pressure monitors  · Place the earpieces of a stethoscope in your ears, and place the bell of the stethoscope over the artery, just below the cuff. · Close the valve on the rubber inflating bulb. · Squeeze the bulb rapidly with your opposite hand to inflate the cuff until the dial or column of mercury reads about 30 mm Hg higher than your usual systolic pressure. If you do not know your usual pressure, inflate the cuff to 210 mm Hg or until the pulse at your wrist disappears. · Open the pressure valve just slightly by twisting or pressing the valve on the bulb. · As you watch the pressure slowly fall, note the level on the dial at which you first start to hear a pulsing or tapping sound through the stethoscope. This is your systolic blood pressure. · Continue letting the air out slowly. The sounds will become muffled and will finally disappear. Note the pressure when the sounds completely disappear. This is your diastolic blood pressure. Let out all the remaining air. · Write your numbers in your log book, along with the date and time. What else should you know about the test?  Here are the categories of blood pressure for adults:  Ideal blood pressure. Systolic is less than 522, and diastolic is less than 80. Elevated blood pressure. Systolic is 945 to 879, and diastolic is less than 80. High blood pressure (hypertension). Systolic is 115 or above. Diastolic is 80 or above. One or both numbers may be high. It is more accurate to take the average of several readings made throughout the day than to rely on a single reading.   Follow-up care is a key part of your treatment and safety. Be sure to make and go to all appointments, and call your doctor if you are having problems. It's also a good idea to keep a list of the medicines you take. Where can you learn more? Go to http://ean-brandon.info/. Enter C427 in the search box to learn more about \"Home Blood Pressure Test: About This Test.\"  Current as of: December 6, 2017  Content Version: 11.8  © 2757-2275 [employer]. Care instructions adapted under license by [employer] (which disclaims liability or warranty for this information). If you have questions about a medical condition or this instruction, always ask your healthcare professional. [address] any warranty or liability for your use of this information.

## 2018-11-02 NOTE — PROGRESS NOTES
INTERNISTS OF [facility]:  11/4/2018, MRN: [id number]      Rabia Leone is a 53 y.o. female and presents to clinic for Vitamin D Deficiency (follow up)    Subjective: The pt is a 52yo female with h/o HTN, hypothyroidism, [place] CMO, mitral valve prolapse, rosecea, hidradenitis suppurativa, GERD, thyroid nodules, HLD, and acne vulgaris. 1. RLE Skin Lesion: Present for over 4 weeks. Not associated with pain. The area is not resolving. No alleviating factors are known. No aggravating factors are known. 2. Hypertension: BP today is 122/78. Her weight today is 157 pounds. It was 174 pounds at her last appointment. She cut back on coffee creamer since her last apt. She cut back on her calories and lost a lot of weight. She stopped drinking a lot of sweet tea. She stopped skipping meals. She eats less red meat. She reports no adverse side effects from taking BP medication. +Lisinopril. No lightheadedness, chest pain,. She has complaint of bilateral blurry vision. Is been over 2 years since her last formal eye exam.  No eye pain. Wt Readings from Last 3 Encounters:   11/02/18 157 lb 3.2 oz (71.3 kg)   04/11/18 174 lb (78.9 kg)   03/29/18 177 lb 6.4 oz (80.5 kg)     3. Rosacea: She has red areas along bilateral cheek areas. Sometimes they are associated with bumps and irritation. She was diagnosed with rosacea. She is treated with doxycycline and is asking for a refill on this medication. She reports that her skin on the lateral knee area with use of doxycycline. She reports no adverse side effects from taking this medication. 4.  Hypothyroidism: Present for over 3 months. On Synthroid. She is presently asymptomatic. 5.  Hyperlipidemia: Present for over 3 months. On simvastatin. She reports no adverse side effects with taking his medication. No refills are needed.       Patient Active Problem List    Diagnosis Date Noted    Vitamin D deficiency 04/11/2018    Acquired hypothyroidism 02/28/2018    Essential hypertension 02/28/2018    GERD (gastroesophageal reflux disease) 02/28/2018    Hyperlipidemia 02/28/2018    Acne vulgaris 02/28/2018    Mitral valve prolapse 02/28/2018    IFG (impaired fasting glucose) 02/28/2018       Current Outpatient Medications   Medication Sig Dispense Refill    cholecalciferol, vitamin D3, (VITAMIN D3 PO) Take  by mouth daily.  doxycycline (MONODOX) 100 mg capsule TAKE ONE CAPSULE BY Twice daily 60 Cap 11    simvastatin (ZOCOR) 20 mg tablet TAKE ONE TABLET BY MOUTH NIGHTLY 90 Tab 3    levothyroxine (SYNTHROID) 125 mcg tablet TAKE ONE TABLET BY MOUTH EVERY DAY BEFORE BREAKFAST 90 Tab 3    lisinopril (PRINIVIL, ZESTRIL) 20 mg tablet Take 1 Tab by mouth daily. 90 Tab 3    clindamycin (CLEOCIN T) 1 % external solution use thin film on affected area  mL 5    multivitamin (ONE A DAY) tablet Take 1 Tab by mouth daily. Indications: VITAMIN DEFICIENCY PREVENTION      ergocalciferol (ERGOCALCIFEROL) 50,000 unit capsule Take 1 Cap by mouth every seven (7) days. 4 Cap 3       No Known Allergies    Past Medical History:   Diagnosis Date    GERD (gastroesophageal reflux disease)     Mitral valve prolapse     Other ill-defined conditions(799.89)     mitral valve prolapse    Thyroid disease     hypo       Past Surgical History:   Procedure Laterality Date    HX GYN      ov cyst    HX GYN      d & c's    HX OTHER SURGICAL         Family History   Problem Relation Age of Onset    Thyroid Disease Mother     Heart Disease Father     Hypertension Father     Diabetes Father     Cataract Brother        Social History     Tobacco Use    Smoking status: Former Smoker    Smokeless tobacco: Never Used   Substance Use Topics    Alcohol use: Yes     Comment: occasionally       ROS   Review of Systems   Constitutional: Negative for chills and fever. HENT: Negative for ear pain and sore throat. Eyes: Positive for blurred vision.  Negative for pain. Respiratory: Negative for cough and shortness of breath. Cardiovascular: Negative for chest pain. Gastrointestinal: Negative for abdominal pain, blood in stool and melena. Genitourinary: Negative for dysuria and hematuria. Musculoskeletal: Negative for joint pain and myalgias. Skin: Negative for rash. Neurological: Negative for tingling, focal weakness and headaches. Endo/Heme/Allergies: Does not bruise/bleed easily. Psychiatric/Behavioral: Negative for substance abuse. Objective     Vitals:    11/02/18 1439   BP: 122/78   Pulse: 80   Resp: 12   Temp: 98 °F (36.7 °C)   TempSrc: Oral   SpO2: 98%   Weight: 157 lb 3.2 oz (71.3 kg)   Height: 5' 6\" (1.676 m)   PainSc:   0 - No pain   LMP: 08/10/2013       Physical Exam   Constitutional: She is oriented to person, place, and time and well-developed, well-nourished, and in no distress. HENT:   Head: Normocephalic and atraumatic. Right Ear: External ear normal.   Left Ear: External ear normal.   Nose: Nose normal.   Mouth/Throat: Oropharynx is clear and moist. No oropharyngeal exudate. Eyes: Conjunctivae and EOM are normal. Pupils are equal, round, and reactive to light. Right eye exhibits no discharge. Left eye exhibits no discharge. No scleral icterus. Neck: Neck supple. Cardiovascular: Normal rate, regular rhythm, normal heart sounds and intact distal pulses. Exam reveals no gallop and no friction rub. No murmur heard. Pulmonary/Chest: Effort normal and breath sounds normal. No respiratory distress. She has no wheezes. She has no rales. Abdominal: Soft. Bowel sounds are normal. She exhibits no distension. There is no tenderness. There is no rebound and no guarding. Musculoskeletal: She exhibits no edema or tenderness ([place]). Lymphadenopathy:     She has no cervical adenopathy. Neurological: She is alert and oriented to person, place, and time. She exhibits normal muscle tone.  Gait normal.   Skin: Skin is warm and dry.   Pea size circular flat pink lesion is present along RLE, NTTP Left axilla nonerythematous nodule is present - circular and mildly TTP c/w her h/o hidadrenitis suppurativa   Psychiatric: Affect normal.   Nursing note and vitals reviewed. LABS   Data Review:   Lab Results   Component Value Date/Time    WBC 5.1 08/19/2013 08:42 AM    HGB 13.8 08/19/2013 08:42 AM    HCT 39.8 08/19/2013 08:42 AM    PLATELET 686 09/99/9444 08:42 AM    MCV 90.5 08/19/2013 08:42 AM       Lab Results   Component Value Date/Time    Sodium 140 08/19/2013 08:42 AM    Potassium 4.0 08/19/2013 08:42 AM    Chloride 107 08/19/2013 08:42 AM    CO2 27 08/19/2013 08:42 AM    Anion gap 6 08/19/2013 08:42 AM    Glucose 133 (H) 08/19/2013 08:42 AM    BUN 9 08/19/2013 08:42 AM    Creatinine 0.89 08/19/2013 08:42 AM    BUN/Creatinine ratio 10 (L) 08/19/2013 08:42 AM    GFR est AA >60 08/19/2013 08:42 AM    GFR est non-AA >60 08/19/2013 08:42 AM    Calcium 8.6 08/19/2013 08:42 AM       No results found for: CHOL, CHOLPOCT, CHOLX, CHLST, CHOLV, HDL, HDLPOC, LDL, LDLCPOC, LDLC, DLDLP, VLDLC, VLDL, TGLX, TRIGL, TRIGP, TGLPOCT, CHHD, CHHDX    No results found for: HBA1C, HGBE8, KEA9ZNYJ, DVX8SDWV, EQW9NOVK    Assessment/Plan:   1. Essential hypertension: Stable. -Continue with lisinopril for now. I instructed her to notify me if her blood pressure decreases any further. I also encouraged her to notify me if she develops symptoms of lightheadedness. I encouraged her to continue her weight loss journey, making healthy food options and choices. I encouraged her to continue losing weight. I will recheck her weight at her follow-up appointment.  -Placing a referral to Ophthalmology given her history of hypertension and blurry vision.  -I will recheck a urine protein, lipid panel, and a BMP just before her follow-up appointment. ORDERS:   - REFERRAL TO OPHTHALMOLOGY    2. Hypothyroidism: Stable. -Continue with her.   I will recheck TFTs and a CBC just before her follow-up appointment. 3. Suppurative hidradenitis, RLE Skin Lesion, and Rosacea:   -Placing a referral to dermatology for long-term management and evaluation of right lower extremity skin lesion.  - Refilling her doxycycline. ORDERS  - doxycycline (MONODOX) 100 mg capsule; TAKE ONE CAPSULE BY Twice daily  Dispense: 60 Cap; Refill: 11  - REFERRAL TO DERMATOLOGY    4.HLD: Stable. +Losing weight.  -Checking a CMP and lipid panel just before her follow-up appointment. 5. General:  - Requesting her last PAP.  - Checking a vitamin D level (given her h/o vitamin D level - the last one this yr measured 23) and an A1C (given her random elevated BG on her labs from this yr). Health Maintenance Due   Topic Date Due    PAP AKA CERVICAL CYTOLOGY  07/17/2015     Lab review: labs are reviewed in the EHR    I have discussed the diagnosis with the patient and the intended plan as seen in the above orders. The patient has received an after-visit summary and questions were answered concerning future plans. I have discussed medication side effects and warnings with the patient as well. I have reviewed the plan of care with the patient, accepted their input and they are in agreement with the treatment goals. All questions were answered. The patient understands the plan of care. Handouts provided today with above information. Pt instructed if symptoms worsen to call the office or report to the ED for continued care. Greater than 50% of the visit time was spent in counseling and/or coordination of care. Voice recognition was used to generate this report, which may have resulted in some phonetic based errors in grammar and contents. Even though attempts were made to correct all the mistakes, some may have been missed, and remained in the body of the document. Follow-up Disposition:  Return in about 6 months (around 5/2/2019) for BP check, weight check.     Macho Patel MD

## 2018-11-02 NOTE — PROGRESS NOTES
Chief Complaint   Patient presents with    Vitamin D Deficiency     follow up       1. Have you been to the ER, urgent care clinic since your last visit? Hospitalized since your last visit? No    2. Have you seen or consulted any other health care providers outside of the [address] [place] [address] since your last visit? Include any pap smears or colon screening. No    Patient was given a copy of the Advanced Directive and understands to bring it in once completed.   Health Maintenance Due   Topic Date Due    PAP AKA CERVICAL CYTOLOGY  07/17/2015

## 2019-01-16 ENCOUNTER — HOSPITAL ENCOUNTER (OUTPATIENT)
Dept: LAB | Age: 49
Discharge: HOME | End: 2019-01-16
Payer: COMMERCIAL

## 2019-01-16 LAB
ALBUMIN SERPL-MCNC: 4 G/DL (ref 3.4–5)
ALBUMIN/GLOB SERPL: 1.3 {RATIO} (ref 0.8–1.7)
ALP SERPL-CCNC: 83 U/L (ref 45–117)
ALT SERPL-CCNC: 48 U/L (ref 13–56)
ANION GAP SERPL CALC-SCNC: 5 MMOL/L (ref 3–18)
AST SERPL-CCNC: 34 U/L (ref 15–37)
BASOPHILS # BLD: 0 K/UL (ref 0–0.1)
BASOPHILS NFR BLD: 1 % (ref 0–2)
BILIRUB SERPL-MCNC: 0.4 MG/DL (ref 0.2–1)
BUN SERPL-MCNC: 7 MG/DL (ref 7–18)
BUN/CREAT SERPL: 9 (ref 12–20)
CALCIUM SERPL-MCNC: 9.2 MG/DL (ref 8.5–10.1)
CHLORIDE SERPL-SCNC: 108 MMOL/L (ref 100–108)
CO2 SERPL-SCNC: 30 MMOL/L (ref 21–32)
CREAT SERPL-MCNC: 0.75 MG/DL (ref 0.6–1.3)
DIFFERENTIAL METHOD BLD: ABNORMAL
EOSINOPHIL # BLD: 0.2 K/UL (ref 0–0.4)
EOSINOPHIL NFR BLD: 3 % (ref 0–5)
ERYTHROCYTE [DISTWIDTH] IN BLOOD BY AUTOMATED COUNT: 11.8 % (ref 11.6–14.5)
GLOBULIN SER CALC-MCNC: 3 G/DL (ref 2–4)
GLUCOSE SERPL-MCNC: 120 MG/DL (ref 74–99)
HCT VFR BLD AUTO: 44.1 % (ref 35–45)
HGB BLD-MCNC: 15.2 G/DL (ref 12–16)
LYMPHOCYTES # BLD: 2.6 K/UL (ref 0.9–3.6)
LYMPHOCYTES NFR BLD: 48 % (ref 21–52)
MCH RBC QN AUTO: 33.1 PG (ref 24–34)
MCHC RBC AUTO-ENTMCNC: 34.5 G/DL (ref 31–37)
MCV RBC AUTO: 96.1 FL (ref 74–97)
MONOCYTES # BLD: 0.5 K/UL (ref 0.05–1.2)
MONOCYTES NFR BLD: 10 % (ref 3–10)
NEUTS SEG # BLD: 2 K/UL (ref 1.8–8)
NEUTS SEG NFR BLD: 38 % (ref 40–73)
PLATELET # BLD AUTO: 254 K/UL (ref 135–420)
PMV BLD AUTO: 9.9 FL (ref 9.2–11.8)
POTASSIUM SERPL-SCNC: 5.1 MMOL/L (ref 3.5–5.5)
PROT SERPL-MCNC: 7 G/DL (ref 6.4–8.2)
RBC # BLD AUTO: 4.59 M/UL (ref 4.2–5.3)
SODIUM SERPL-SCNC: 143 MMOL/L (ref 136–145)
WBC # BLD AUTO: 5.4 K/UL (ref 4.6–13.2)

## 2019-01-16 PROCEDURE — 87389 HIV-1 AG W/HIV-1&-2 AB AG IA: CPT

## 2019-01-16 PROCEDURE — 80053 COMPREHEN METABOLIC PANEL: CPT

## 2019-01-16 PROCEDURE — 86480 TB TEST CELL IMMUN MEASURE: CPT

## 2019-01-16 PROCEDURE — 80074 ACUTE HEPATITIS PANEL: CPT

## 2019-01-16 PROCEDURE — 85025 COMPLETE CBC W/AUTO DIFF WBC: CPT

## 2019-01-16 PROCEDURE — 36415 COLL VENOUS BLD VENIPUNCTURE: CPT

## 2019-01-17 LAB
HAV IGM SER QL: NEGATIVE
HBV CORE IGM SER QL: NEGATIVE
HBV SURFACE AG SER QL: <0.1 INDEX
HBV SURFACE AG SER QL: NEGATIVE
HCV AB SER IA-ACNC: 0.04 INDEX
HCV AB SERPL QL IA: NEGATIVE
HCV COMMENT,HCGAC: NORMAL
SP1: NORMAL
SP2: NORMAL
SP3: NORMAL

## 2019-01-18 LAB
HIV 1+2 AB+HIV1 P24 AG SERPL QL IA: NONREACTIVE
HIV12 RESULT COMMENT, HHIVC: NORMAL

## 2019-01-20 LAB
QUANTIFERON CRITERIA, QFI1T: NORMAL
QUANTIFERON MITOGEN VALUE: >10 IU/ML
QUANTIFERON NIL VALUE: 0.02 IU/ML
QUANTIFERON PLUS, QFI6T: NEGATIVE
QUANTIFERON TB1 AG: 0.02 IU/ML
QUANTIFERON TB2 AG: 0.02 IU/ML

## 2019-05-16 DIAGNOSIS — L71.9 ROSACEA: ICD-10-CM

## 2019-05-16 DIAGNOSIS — L73.2 SUPPURATIVE HIDRADENITIS: ICD-10-CM

## 2019-05-16 DIAGNOSIS — E78.00 PURE HYPERCHOLESTEROLEMIA: ICD-10-CM

## 2019-05-16 DIAGNOSIS — E03.9 ACQUIRED HYPOTHYROIDISM: ICD-10-CM

## 2019-05-16 RX ORDER — SIMVASTATIN 20 MG/1
20 TABLET, FILM COATED ORAL
Qty: 90 TAB | Refills: 3 | Status: SHIPPED | OUTPATIENT
Start: 2019-05-16 | End: 2020-03-19 | Stop reason: SDUPTHER

## 2019-05-16 RX ORDER — LEVOTHYROXINE SODIUM 125 UG/1
125 TABLET ORAL
Qty: 90 TAB | Refills: 3 | Status: SHIPPED | OUTPATIENT
Start: 2019-05-16 | End: 2020-03-19 | Stop reason: SDUPTHER

## 2019-05-16 RX ORDER — DOXYCYCLINE 100 MG/1
100 CAPSULE ORAL 2 TIMES DAILY
Qty: 180 CAP | Refills: 3 | Status: SHIPPED | OUTPATIENT
Start: 2019-05-16 | End: 2020-03-19 | Stop reason: SDUPTHER

## 2019-05-16 NOTE — TELEPHONE ENCOUNTER
Last Visit: 11/2/18 with MD Yeni Calero  Next Appointment: 5/29/19 with MD Yeni Calero    Requested Prescriptions     Pending Prescriptions Disp Refills    levothyroxine (SYNTHROID) 125 mcg tablet 90 Tab 3     Sig: Take 1 Tab by mouth Daily (before breakfast).  simvastatin (ZOCOR) 20 mg tablet 90 Tab 3     Sig: Take 1 Tab by mouth nightly.  doxycycline (MONODOX) 100 mg capsule 180 Cap 3     Sig: Take 1 Cap by mouth two (2) times a day.

## 2019-05-23 ENCOUNTER — APPOINTMENT (OUTPATIENT)
Dept: INTERNAL MEDICINE CLINIC | Age: 49
End: 2019-05-23

## 2019-05-24 LAB
25(OH)D3 SERPL-MCNC: 30 NG/ML (ref 30–100)
ABSOLUTE BANDS, 67058: ABNORMAL
ABSOLUTE BLASTS: ABNORMAL
ABSOLUTE METAMYELOCYTES, 900360: ABNORMAL
ABSOLUTE MYELOCYTES: ABNORMAL
ABSOLUTE NRBC,ANRBC: ABNORMAL
ABSOLUTE PROMYELOCYTES: ABNORMAL
ALB/GLOBRATIO, 58C: 1.8 (CALC) (ref 1–2.5)
ALBUMIN SERPL-MCNC: 4.2 G/DL (ref 3.6–5.1)
ALP SERPL-CCNC: 64 U/L (ref 33–115)
ALT SERPL-CCNC: 36 U/L (ref 6–29)
AST SERPL W P-5'-P-CCNC: 26 U/L (ref 10–35)
BANDS,BANDS: ABNORMAL
BASOPHILS # BLD: 41 CELLS/UL (ref 0–200)
BASOPHILS NFR BLD: 1.1 %
BILIRUB SERPL-MCNC: 0.6 MG/DL (ref 0.2–1.2)
BLASTS,BLAST: ABNORMAL
BUN SERPL-MCNC: 8 MG/DL (ref 7–25)
BUN/CREATININE RATIO,BUCR: ABNORMAL (CALC) (ref 6–22)
CALCIUM SERPL-MCNC: 9.5 MG/DL (ref 8.6–10.2)
CHLORIDE SERPL-SCNC: 110 MMOL/L (ref 98–110)
CHOL/HDL RATIO,CHHDX: 3.5 (CALC)
CHOLEST SERPL-MCNC: 164 MG/DL
CO2 SERPL-SCNC: 26 MMOL/L (ref 20–32)
COMMENT(S): ABNORMAL
CREAT SERPL-MCNC: 0.68 MG/DL (ref 0.5–1.1)
CREATININE URINE,9612018: 129 MG/DL (ref 20–275)
EOSINOPHIL # BLD: 89 CELLS/UL (ref 15–500)
EOSINOPHIL NFR BLD: 2.4 %
ERYTHROCYTE [DISTWIDTH] IN BLOOD BY AUTOMATED COUNT: 11.7 % (ref 11–15)
GLOBULIN,GLOB: 2.3 G/DL (CALC) (ref 1.9–3.7)
GLUCOSE SERPL-MCNC: 91 MG/DL (ref 65–99)
HBA1C MFR BLD HPLC: 4.7 % OF TOTAL HGB
HCT VFR BLD AUTO: 41.7 % (ref 35–45)
HDLC SERPL-MCNC: 47 MG/DL
HGB BLD-MCNC: 14.1 G/DL (ref 11.7–15.5)
LDL-CHOLESTEROL: 98 MG/DL (CALC)
LYMPHOCYTES # BLD: 1606 CELLS/UL (ref 850–3900)
LYMPHOCYTES NFR BLD: 43.4 %
MCH RBC QN AUTO: 33.7 PG (ref 27–33)
MCHC RBC AUTO-ENTMCNC: 33.8 G/DL (ref 32–36)
MCV RBC AUTO: 99.5 FL (ref 80–100)
METAMYELOCYTES,METAS: ABNORMAL
MICROALBUMIN,URINE RANDOM 140054: 0.5 MG/DL
MICROALBUMIN/CREAT RATIO: 4 MCG/MG CREAT
MONOCYTES # BLD: 359 CELLS/UL (ref 200–950)
MONOCYTES NFR BLD: 9.7 %
MYELOCYTES,MYELO: ABNORMAL
NEUTROPHILS # BLD AUTO: 1606 CELLS/UL (ref 1500–7800)
NEUTROPHILS # BLD: 43.4 %
NON-HDL CHOLESTEROL, 011976: 117 MG/DL (CALC)
NRBC: ABNORMAL
PLATELET # BLD AUTO: 255 THOUSAND/UL (ref 140–400)
PMV BLD AUTO: 9.1 FL (ref 7.5–12.5)
POTASSIUM SERPL-SCNC: 4.2 MMOL/L (ref 3.5–5.3)
PROMYELOCYTES,PRO: ABNORMAL
PROT SERPL-MCNC: 6.5 G/DL (ref 6.1–8.1)
RBC # BLD AUTO: 4.19 MILLION/UL (ref 3.8–5.1)
REACTIVE LYMPHS: ABNORMAL
SODIUM SERPL-SCNC: 143 MMOL/L (ref 135–146)
T4 FREE SERPL-MCNC: 1.3 NG/DL (ref 0.8–1.8)
TRIGL SERPL-MCNC: 95 MG/DL (ref ?–150)
TSH SERPL DL<=0.005 MIU/L-ACNC: 2.68 MIU/L
WBC # BLD AUTO: 3.7 THOUSAND/UL (ref 3.8–10.8)

## 2019-06-24 ENCOUNTER — OFFICE VISIT (OUTPATIENT)
Dept: INTERNAL MEDICINE CLINIC | Age: 49
End: 2019-06-24

## 2019-06-24 VITALS
SYSTOLIC BLOOD PRESSURE: 146 MMHG | OXYGEN SATURATION: 97 % | WEIGHT: 146.8 LBS | HEIGHT: 66 IN | BODY MASS INDEX: 23.59 KG/M2 | RESPIRATION RATE: 12 BRPM | TEMPERATURE: 98.4 F | DIASTOLIC BLOOD PRESSURE: 83 MMHG | HEART RATE: 75 BPM

## 2019-06-24 DIAGNOSIS — I10 ESSENTIAL HYPERTENSION: ICD-10-CM

## 2019-06-24 DIAGNOSIS — E55.9 VITAMIN D DEFICIENCY: ICD-10-CM

## 2019-06-24 DIAGNOSIS — L70.0 ACNE VULGARIS: ICD-10-CM

## 2019-06-24 DIAGNOSIS — E78.5 HYPERLIPIDEMIA, UNSPECIFIED HYPERLIPIDEMIA TYPE: ICD-10-CM

## 2019-06-24 DIAGNOSIS — D72.819 LEUKOPENIA, UNSPECIFIED TYPE: ICD-10-CM

## 2019-06-24 DIAGNOSIS — E03.9 ACQUIRED HYPOTHYROIDISM: Primary | ICD-10-CM

## 2019-06-24 DIAGNOSIS — L71.9 ROSACEA: ICD-10-CM

## 2019-06-24 NOTE — PATIENT INSTRUCTIONS
Health Maintenance Due   Topic Date Due    PAP AKA CERVICAL CYTOLOGY  07/17/2015          Eating Healthy Foods: Care Instructions  Your Care Instructions    Eating healthy foods can help lower your risk for disease. Healthy food gives you energy and keeps your heart strong, your brain active, your muscles working, and your bones strong. A healthy diet includes a variety of foods from the basic food groups: grains, vegetables, fruits, milk and milk products, and meat and beans. Some people may eat more of their favorite foods from only one food group and, as a result, miss getting the nutrients they need. So, it is important to pay attention not only to what you eat but also to what you are missing from your diet. You can eat a healthy, balanced diet by making a few small changes. Follow-up care is a key part of your treatment and safety. Be sure to make and go to all appointments, and call your doctor if you are having problems. It's also a good idea to know your test results and keep a list of the medicines you take. How can you care for yourself at home? Look at what you eat  · Keep a food diary for a week or two and record everything you eat or drink. Track the number of servings you eat from each food group. · For a balanced diet every day, eat a variety of:  ? 6 or more ounce-equivalents of grains, such as cereals, breads, crackers, rice, or pasta, every day. An ounce-equivalent is 1 slice of bread, 1 cup of ready-to-eat cereal, or ½ cup of cooked rice, cooked pasta, or cooked cereal.  ? 2½ cups of vegetables, especially:  § Dark-green vegetables such as broccoli and spinach. § Orange vegetables such as carrots and sweet potatoes. § Dry beans (such as rashid and kidney beans) and peas (such as lentils). ? 2 cups of fresh, frozen, or canned fruit. A small apple or 1 banana or orange equals 1 cup. ? 3 cups of nonfat or low-fat milk, yogurt, or other milk products.   ? 5½ ounces of meat and beans, such as chicken, fish, lean meat, beans, nuts, and seeds. One egg, 1 tablespoon of peanut butter, ½ ounce nuts or seeds, or ¼ cup of cooked beans equals 1 ounce of meat. · Learn how to read food labels for serving sizes and ingredients. Fast-food and convenience-food meals often contain few or no fruits or vegetables. Make sure you eat some fruits and vegetables to make the meal more nutritious. · Look at your food diary. For each food group, add up what you have eaten and then divide the total by the number of days. This will give you an idea of how much you are eating from each food group. See if you can find some ways to change your diet to make it more healthy. Start small  · Do not try to make dramatic changes to your diet all at once. You might feel that you are missing out on your favorite foods and then be more likely to fail. · Start slowly, and gradually change your habits. Try some of the following:  ? Use whole wheat bread instead of white bread. ? Use nonfat or low-fat milk instead of whole milk. ? Eat brown rice instead of white rice, and eat whole wheat pasta instead of white-flour pasta. ? Try low-fat cheeses and low-fat yogurt. ? Add more fruits and vegetables to meals and have them for snacks. ? Add lettuce, tomato, cucumber, and onion to sandwiches. ? Add fruit to yogurt and cereal.  Enjoy food  · You can still eat your favorite foods. You just may need to eat less of them. If your favorite foods are high in fat, salt, and sugar, limit how often you eat them, but do not cut them out entirely. · Eat a wide variety of foods. Make healthy choices when eating out  · The type of restaurant you choose can help you make healthy choices. Even fast-food chains are now offering more low-fat or healthier choices on the menu. · Choose smaller portions, or take half of your meal home. · When eating out, try:  ?  A veggie pizza with a whole wheat crust or grilled chicken (instead of sausage or pepperoni). ? Pasta with roasted vegetables, grilled chicken, or marinara sauce instead of cream sauce. ? A vegetable wrap or grilled chicken wrap. ? Broiled or poached food instead of fried or breaded items. Make healthy choices easy  · Buy packaged, prewashed, ready-to-eat fresh vegetables and fruits, such as baby carrots, salad mixes, and chopped or shredded broccoli and cauliflower. · Buy packaged, presliced fruits, such as melon or pineapple. · Choose 100% fruit or vegetable juice instead of soda. Limit juice intake to 4 to 6 oz (½ to ¾ cup) a day. · Blend low-fat yogurt, fruit juice, and canned or frozen fruit to make a smoothie for breakfast or a snack. Where can you learn more? Go to http://ean-brandon.info/. Enter T756 in the search box to learn more about \"Eating Healthy Foods: Care Instructions. \"  Current as of: March 28, 2018  Content Version: 11.9  © 2599-7793 [employer]. Care instructions adapted under license by [employer] (which disclaims liability or warranty for this information). If you have questions about a medical condition or this instruction, always ask your healthcare professional. [address] any warranty or liability for your use of this information.

## 2019-06-24 NOTE — PROGRESS NOTES
Chief Complaint   Patient presents with    Hypertension     follow up  ROOM  2    Labs     done 5-24-19        1. Have you been to the ER, urgent care clinic since your last visit? Hospitalized since your last visit? No    2. Have you seen or consulted any other health care providers outside of the [address] [place] [address] since your last visit? Include any pap smears or colon screening. No    Patient was given a copy of the Advanced Directive and understands to bring it in once completed.   Health Maintenance Due   Topic Date Due    PAP AKA CERVICAL CYTOLOGY  07/17/2015

## 2019-06-24 NOTE — PROGRESS NOTES
INTERNISTS OF [facility]:  6/24/2019, MRN: [id number]      Oxana Roach is a 51 y.o. female and presents to clinic for Hypertension (follow up  ROOM  2) and Labs (done 5-24-19 )    Subjective: The pt is a 47yo female with h/o HTN, hypothyroidism, [place] CMO, mitral valve prolapse, rosecea, hidradenitis suppurativa, GERD, thyroid nodules, HLD, and acne vulgaris. 1.  Hypertension: BP is 130/88 today. Her most recent labs show no evidence of chronic kidney disease. Her BPs at home were 80s/60s while on lisinopril. Her BP was so low she had dizzy spells. She stopped taking lisinopril as a result. She stopped this rx in January 2019. BP is 130/88 today. 2. HLD: Present for over 6 months. On simvastatin. No adverse side effects of taking his medication. Her weight is 146lbs today. She is not regularly exercising. She has been reducing her portions. Her most recent labs show: Her A1c is within normal limits. Her total cholesterol is 164. Her HDL is 47. Her triglycerides are 95. Her LDL is 98. Her renal function liver function labs are unremarkable except for a mildly elevated ALT of 36.    3.  Hypothyroidism: Present for over 6 months. On Synthroid. Her most recent TFTs are unremarkable. 4. Health Maintenance:  - Overdue for a PAP per our records. She was told that she needs a Pap every 3 years but has a history of a hysterectomy. Followed with Dr. Sue Guillaume.  -Her most recent labs show a mildly low white blood cell count. No fever chills. 5. Hidradenitis Suppurativa: Present for years. She declined taking Humira for at bedtime. She has been using some type of over-the-counter ointment. This is working well along with clindamycin topical Rx as needed. She has no open areas today. HS affected areas include her axilla bilaterally. 6.  Rosacea: Treated well with doxycycline. No adverse side effects with taking this medicine.         Patient Active Problem List    Diagnosis Date Noted    Vitamin D deficiency 04/11/2018    Acquired hypothyroidism 02/28/2018    Essential hypertension 02/28/2018    GERD (gastroesophageal reflux disease) 02/28/2018    Hyperlipidemia 02/28/2018    Acne vulgaris 02/28/2018    Mitral valve prolapse 02/28/2018    IFG (impaired fasting glucose) 02/28/2018       Current Outpatient Medications   Medication Sig Dispense Refill    levothyroxine (SYNTHROID) 125 mcg tablet Take 1 Tab by mouth Daily (before breakfast). 90 Tab 3    simvastatin (ZOCOR) 20 mg tablet Take 1 Tab by mouth nightly. 90 Tab 3    doxycycline (MONODOX) 100 mg capsule Take 1 Cap by mouth two (2) times a day. 180 Cap 3    cholecalciferol, vitamin D3, (VITAMIN D3 PO) Take  by mouth daily.  clindamycin (CLEOCIN T) 1 % external solution use thin film on affected area  mL 5    multivitamin (ONE A DAY) tablet Take 1 Tab by mouth daily. Indications: VITAMIN DEFICIENCY PREVENTION      lisinopril (PRINIVIL, ZESTRIL) 20 mg tablet Take 1 Tab by mouth daily. 80 Tab 3       No Known Allergies    Past Medical History:   Diagnosis Date    GERD (gastroesophageal reflux disease)     Mitral valve prolapse     Other ill-defined conditions(799.89)     mitral valve prolapse    Thyroid disease     hypo       Past Surgical History:   Procedure Laterality Date    HX GYN      ov cyst    HX GYN      d & c's    HX OTHER SURGICAL         Family History   Problem Relation Age of Onset    Thyroid Disease Mother     Heart Disease Father     Hypertension Father     Diabetes Father     Cataract Brother        Social History     Tobacco Use    Smoking status: Former Smoker     Years: 4.00     Types: Cigarettes    Smokeless tobacco: Never Used   Substance Use Topics    Alcohol use: Yes     Comment: occasionally       ROS   Review of Systems   Constitutional: Negative for chills, fever and malaise/fatigue. HENT: Negative for ear pain and sore throat. Eyes: Negative for blurred vision and pain. Respiratory: Negative for cough and shortness of breath. Cardiovascular: Negative for chest pain. Gastrointestinal: Negative for abdominal pain, blood in stool and melena. Genitourinary: Negative for dysuria and hematuria. Musculoskeletal: Negative for joint pain and myalgias. Skin: Negative for rash. Neurological: Negative for tingling, focal weakness and headaches. Endo/Heme/Allergies: Does not bruise/bleed easily. Psychiatric/Behavioral: Negative for substance abuse. Objective     Vitals:    06/24/19 1322   BP: 130/88   Pulse: 77   Resp: 14   Temp: 98.4 °F (36.9 °C)   TempSrc: Oral   SpO2: 97%   Weight: 146 lb 12.8 oz (66.6 kg)   Height: 5' 6\" (1.676 m)   PainSc:   0 - No pain   LMP: 08/10/2013       Physical Exam   Constitutional: She is oriented to person, place, and time and well-developed, well-nourished, and in no distress. HENT:   Head: Normocephalic and atraumatic. Right Ear: External ear normal.   Left Ear: External ear normal.   Nose: Nose normal.   Mouth/Throat: Oropharynx is clear and moist. No oropharyngeal exudate. Eyes: Pupils are equal, round, and reactive to light. Conjunctivae and EOM are normal. Right eye exhibits no discharge. Left eye exhibits no discharge. No scleral icterus. Neck: Neck supple. Cardiovascular: Normal rate, regular rhythm, normal heart sounds and intact distal pulses. Exam reveals no gallop and no friction rub. No murmur heard. Pulmonary/Chest: Effort normal and breath sounds normal. No respiratory distress. She has no wheezes. She has no rales. Abdominal: Soft. Bowel sounds are normal. She exhibits no distension. There is no tenderness. There is no rebound and no guarding. Musculoskeletal: She exhibits no edema or tenderness (BUE). Lymphadenopathy:     She has no cervical adenopathy. Neurological: She is alert and oriented to person, place, and time. She exhibits normal muscle tone. Gait normal.   Skin: Skin is warm and dry.  No erythema. Psychiatric: Affect normal.   Nursing note and vitals reviewed. LABS   Data Review:   Lab Results   Component Value Date/Time    WBC 3.7 (L) 05/24/2019 11:19 AM    HGB 14.1 05/24/2019 11:19 AM    HCT 41.7 05/24/2019 11:19 AM    PLATELET 806 63/15/8229 11:19 AM    MCV 99.5 05/24/2019 11:19 AM       Lab Results   Component Value Date/Time    Sodium 143 05/24/2019 11:19 AM    Potassium 4.2 05/24/2019 11:19 AM    Chloride 110 05/24/2019 11:19 AM    CO2 26 05/24/2019 11:19 AM    Anion gap 5 01/16/2019 01:17 PM    Glucose 91 05/24/2019 11:19 AM    BUN 8 05/24/2019 11:19 AM    Creatinine 0.68 05/24/2019 11:19 AM    BUN/Creatinine ratio NOT APPLICABLE 75/88/7942 90:96 AM    GFR est  05/24/2019 11:19 AM    GFR est non- 05/24/2019 11:19 AM    Calcium 9.5 05/24/2019 11:19 AM       Lab Results   Component Value Date/Time    Cholesterol, total 164 05/24/2019 11:19 AM    HDL Cholesterol 47 (L) 05/24/2019 11:19 AM    LDL-CHOLESTEROL 98 05/24/2019 11:19 AM    Triglyceride 95 05/24/2019 11:19 AM    Cholesterol/HDL ratio 3.5 05/24/2019 11:19 AM       Lab Results   Component Value Date/Time    Hemoglobin A1c 4.7 05/24/2019 11:19 AM       Assessment/Plan:   1. Hypertension: Resolved with weight loss. -Return to clinic for BP check.  - Ok to continue holding antihypertensive rx    2. HLD: Stable. Her prediabetes resolved. -Continue with Rx as prescribed.  -I encouraged her to continue her weight loss journey. I encouraged her to exercise regularly. I will recheck her weight at her follow-up appointment. - Checking a lipid panel, CMP, and A1c just before her follow-up appointment. 3.  Hypothyroidism:  -Continue with Rx as prescribed. -I will check a CBC and TFTs just before her follow-up appointment. 4.  Health Maintenance:   -Requesting her last Pap. She states she has a history of a hysterectomy. Nevertheless, she was told by Dr. Guerda Mejía that she needs Paps every 3 years.  Does she still have her cervix? The patient is not sure. As result, I will request records today.  -I will check a vitamin D level just before her follow-up appointment.  -We will check a CBC in 1 year given her mildly low white blood cell count. 5. HS and Rosacea:  - Continue with Rx as prescribed. - Okay to return to Dermatology if symptoms worsen. Health Maintenance Due   Topic Date Due    PAP AKA CERVICAL CYTOLOGY  07/17/2015     Lab review: labs are reviewed in the EHR     I have discussed the diagnosis with the patient and the intended plan as seen in the above orders. The patient has received an after-visit summary and questions were answered concerning future plans. I have discussed medication side effects and warnings with the patient as well. I have reviewed the plan of care with the patient, accepted their input and they are in agreement with the treatment goals. All questions were answered. The patient understands the plan of care. Handouts provided today with above information. Pt instructed if symptoms worsen to call the office or report to the ED for continued care. Greater than 50% of the visit time was spent in counseling and/or coordination of care. Voice recognition was used to generate this report, which may have resulted in some phonetic based errors in grammar and contents. Even though attempts were made to correct all the mistakes, some may have been missed, and remained in the body of the document. Follow-up and Dispositions    · Return in about 6 months (around 12/24/2019) for BP check, weight check.          Christiana Hawkins MD

## 2019-07-30 ENCOUNTER — OFFICE VISIT (OUTPATIENT)
Dept: FAMILY MEDICINE CLINIC | Age: 49
End: 2019-07-30

## 2019-07-30 VITALS
OXYGEN SATURATION: 98 % | DIASTOLIC BLOOD PRESSURE: 83 MMHG | TEMPERATURE: 98.4 F | HEART RATE: 68 BPM | HEIGHT: 66 IN | BODY MASS INDEX: 23.56 KG/M2 | WEIGHT: 146.6 LBS | RESPIRATION RATE: 18 BRPM | SYSTOLIC BLOOD PRESSURE: 127 MMHG

## 2019-07-30 DIAGNOSIS — L23.7 ALLERGIC CONTACT DERMATITIS DUE TO PLANTS, EXCEPT FOOD: Primary | ICD-10-CM

## 2019-07-30 RX ORDER — TRIAMCINOLONE ACETONIDE 1 MG/G
OINTMENT TOPICAL 2 TIMES DAILY
Qty: 30 G | Refills: 0 | Status: SHIPPED | OUTPATIENT
Start: 2019-07-30 | End: 2021-01-01

## 2019-07-30 RX ORDER — METHYLPREDNISOLONE 4 MG/1
TABLET ORAL
Qty: 1 DOSE PACK | Refills: 0 | Status: SHIPPED | OUTPATIENT
Start: 2019-07-30 | End: 2021-01-01

## 2019-07-30 NOTE — PROGRESS NOTES
Chief Complaint   Patient presents with    Rash     1. Have you been to the ER, urgent care clinic since your last visit? Hospitalized since your last visit? No    2. Have you seen or consulted any other health care providers outside of the [address] [place] [address] since your last visit? Include any pap smears or colon screening.  No

## 2019-07-30 NOTE — PATIENT INSTRUCTIONS

## 2019-07-30 NOTE — PROGRESS NOTES
KRISTEN Gordon is a 51 y.o. female who presents today for rash. Pt was doing yard work about 4 days ago and believes she may have come in contact with poison ivy. Pt developed itchy red rash to right forearm and also felt itchy to other extremities. Pt has not tried any OTC medication for symptoms so far. Pt denies fever, chills, nausea/vomiting, myalgia and dizziness. Current Outpatient Medications   Medication Sig Dispense Refill    methylPREDNISolone (MEDROL DOSEPACK) 4 mg tablet Take as directed on pack 1 Dose Pack 0    triamcinolone acetonide (KENALOG) 0.1 % ointment Apply  to affected area two (2) times a day. use thin layer 30 g 0    levothyroxine (SYNTHROID) 125 mcg tablet Take 1 Tab by mouth Daily (before breakfast). 90 Tab 3    simvastatin (ZOCOR) 20 mg tablet Take 1 Tab by mouth nightly. 90 Tab 3    doxycycline (MONODOX) 100 mg capsule Take 1 Cap by mouth two (2) times a day. 180 Cap 3    cholecalciferol, vitamin D3, (VITAMIN D3 PO) Take  by mouth daily.  clindamycin (CLEOCIN T) 1 % external solution use thin film on affected area  mL 5    multivitamin (ONE A DAY) tablet Take 1 Tab by mouth daily. Indications: VITAMIN DEFICIENCY PREVENTION        No Known Allergies    SUBJECTIVE:  Review of Systems   Constitutional: Negative for chills, fever and malaise/fatigue. Respiratory: Negative for shortness of breath. Cardiovascular: Negative for chest pain, palpitations and leg swelling. Gastrointestinal: Negative for abdominal pain, diarrhea, nausea and vomiting. Musculoskeletal: Negative for myalgias. Skin: Positive for itching and rash. Neurological: Negative for dizziness, tingling, sensory change and headaches.         OBJECTIVE:  Visit Vitals  /83 (BP 1 Location: Left arm, BP Patient Position: Sitting)   Pulse 68   Temp 98.4 °F (36.9 °C) (Oral)   Resp 18   Ht 5' 6\" (1.676 m)   Wt 146 lb 9.6 oz (66.5 kg)   LMP 08/10/2013   SpO2 98%   BMI 23.66 kg/m² Physical Exam   Constitutional: She is oriented to person, place, and time and well-developed, well-nourished, and in no distress. HENT:   Head: Normocephalic. Eyes: Pupils are equal, round, and reactive to light. Conjunctivae and EOM are normal.   Cardiovascular: Normal rate, regular rhythm and normal heart sounds. Pulmonary/Chest: Effort normal and breath sounds normal. She has no wheezes. She has no rales. She exhibits no tenderness. Musculoskeletal: She exhibits no edema or tenderness. Neurological: She is alert and oriented to person, place, and time. Gait normal.   Skin: Skin is warm and dry. Rash (right arm) noted. There is erythema. Nursing note and vitals reviewed. ASSESSMENT:  Diagnoses and all orders for this visit:    1. Allergic contact dermatitis due to plants, except food  -     methylPREDNISolone (MEDROL DOSEPACK) 4 mg tablet; Take as directed on pack  -     triamcinolone acetonide (KENALOG) 0.1 % ointment; Apply  to affected area two (2) times a day. use thin layer    PLAN:  Start Medrol dose pack  Start Traimcinolone BID as needed for 10 days  May also take OTC Benadryl for itching    I have discussed the diagnosis with the patient and the intended plan as seen in the above orders. The patient has received an after-visit summary and questions were answered concerning future plans. I have discussed medication side effects and warnings with the patient as well. Patient will call for further questions. Follow-up and Dispositions    · Return in about 2 weeks (around 8/13/2019), or if symptoms worsen or fail to improve.        Roxanne Haji NP

## 2019-08-06 DIAGNOSIS — R21 RASH: Primary | ICD-10-CM

## 2019-08-06 DIAGNOSIS — L03.113 CELLULITIS OF RIGHT UPPER EXTREMITY: ICD-10-CM

## 2019-08-06 RX ORDER — CEPHALEXIN 500 MG/1
500 CAPSULE ORAL 4 TIMES DAILY
Qty: 40 CAP | Refills: 0 | Status: SHIPPED | OUTPATIENT
Start: 2019-08-06 | End: 2019-08-16

## 2019-09-12 ENCOUNTER — HOSPITAL ENCOUNTER (OUTPATIENT)
Dept: MAMMOGRAPHY | Age: 49
Discharge: HOME | End: 2019-09-12
Attending: INTERNAL MEDICINE
Payer: COMMERCIAL

## 2019-09-12 DIAGNOSIS — Z12.39 SCREENING BREAST EXAMINATION: ICD-10-CM

## 2019-09-12 PROCEDURE — 77063 BREAST TOMOSYNTHESIS BI: CPT

## 2020-03-19 DIAGNOSIS — E03.9 ACQUIRED HYPOTHYROIDISM: ICD-10-CM

## 2020-03-19 DIAGNOSIS — L71.9 ROSACEA: ICD-10-CM

## 2020-03-19 DIAGNOSIS — E78.00 PURE HYPERCHOLESTEROLEMIA: ICD-10-CM

## 2020-03-19 DIAGNOSIS — L73.2 SUPPURATIVE HIDRADENITIS: ICD-10-CM

## 2020-03-19 RX ORDER — SIMVASTATIN 20 MG/1
20 TABLET, FILM COATED ORAL
Qty: 90 TAB | Refills: 1 | Status: SHIPPED | OUTPATIENT
Start: 2020-03-19 | End: 2020-11-30

## 2020-03-19 RX ORDER — LEVOTHYROXINE SODIUM 125 UG/1
125 TABLET ORAL
Qty: 90 TAB | Refills: 1 | Status: SHIPPED | OUTPATIENT
Start: 2020-03-19 | End: 2020-11-30

## 2020-03-19 RX ORDER — DOXYCYCLINE 100 MG/1
100 CAPSULE ORAL 2 TIMES DAILY
Qty: 180 CAP | Refills: 1 | Status: SHIPPED | OUTPATIENT
Start: 2020-03-19 | End: 2020-11-30

## 2020-03-19 NOTE — TELEPHONE ENCOUNTER
Last Visit: 6/24/19 with MD Mo Hunt  Next Appointment: 6/24/20 with MD Mo Hunt  Previous Refill Encounter(s): 5/16/19 Synthroid & Zocor #90 with 3 refills, Monodox #180 with 3 refills    Requested Prescriptions     Pending Prescriptions Disp Refills    levothyroxine (SYNTHROID) 125 mcg tablet 90 Tab 3     Sig: Take 1 Tab by mouth Daily (before breakfast).  simvastatin (ZOCOR) 20 mg tablet 90 Tab 3     Sig: Take 1 Tab by mouth nightly.  doxycycline (MONODOX) 100 mg capsule 180 Cap 3     Sig: Take 1 Cap by mouth two (2) times a day.

## 2020-06-12 ENCOUNTER — TELEPHONE (OUTPATIENT)
Dept: INTERNAL MEDICINE CLINIC | Age: 50
End: 2020-06-12

## 2020-06-12 NOTE — TELEPHONE ENCOUNTER
Patient reached and given message per DR. Johny Sherman. Patient verbalizes understanding and had no further questions.

## 2020-06-12 NOTE — TELEPHONE ENCOUNTER
Pt calling asking if she can get tested for covid. She has a rash on her toes for 4 days. Says she has no other symptoms but her daughter works in the covid clinic at [facility] and told her she needs to be tested for covid toes.

## 2020-06-18 ENCOUNTER — OFFICE VISIT (OUTPATIENT)
Dept: FAMILY MEDICINE CLINIC | Facility: CLINIC | Age: 50
End: 2020-06-18

## 2020-06-18 ENCOUNTER — TELEPHONE (OUTPATIENT)
Dept: INTERNAL MEDICINE CLINIC | Age: 50
End: 2020-06-18

## 2020-06-18 ENCOUNTER — HOSPITAL ENCOUNTER (OUTPATIENT)
Dept: LAB | Age: 50
Discharge: HOME | End: 2020-06-18
Payer: COMMERCIAL

## 2020-06-18 DIAGNOSIS — Z20.822 CLOSE EXPOSURE TO COVID-19 VIRUS: Primary | ICD-10-CM

## 2020-06-18 DIAGNOSIS — Z20.822 CLOSE EXPOSURE TO COVID-19 VIRUS: ICD-10-CM

## 2020-06-18 PROCEDURE — 87635 SARS-COV-2 COVID-19 AMP PRB: CPT

## 2020-06-18 NOTE — TELEPHONE ENCOUNTER
Pt is requesting an order for Cov19 testing.  has tested positive last Friday and is going back to [facility] today for second test per Dr Cheyenne Dickey. [facility] said they can test her but they need order entered. She has burning, tingling and a rash across her toes and a headache. Please advise her at [phone] once order entered.

## 2020-06-18 NOTE — TELEPHONE ENCOUNTER
Ordering a Covid-19 test for the pt, who is high risk as a [employer] employee and has a known exposure (her ). Please let her know.     Dr. Jair Cavanaugh  [facility] of [address], [address] [place] [address] Str.  Phone: [phone]  Fax: [phone]

## 2020-06-23 LAB — SARS-COV-2, COV2NT: NOT DETECTED

## 2020-06-23 NOTE — PROGRESS NOTES
Please let her know that she screened negative for Covid-19 per her lab result.     Dr. Boy Santos  [facility] of [facility], [address] [place] [address], [facility] [address]  Phone: [phone]  Fax: [phone]

## 2020-06-24 ENCOUNTER — VIRTUAL VISIT (OUTPATIENT)
Dept: INTERNAL MEDICINE CLINIC | Age: 50
End: 2020-06-24

## 2020-06-24 ENCOUNTER — TELEPHONE (OUTPATIENT)
Dept: INTERNAL MEDICINE CLINIC | Age: 50
End: 2020-06-24

## 2020-06-24 DIAGNOSIS — R73.01 IFG (IMPAIRED FASTING GLUCOSE): ICD-10-CM

## 2020-06-24 DIAGNOSIS — L71.9 ROSACEA: ICD-10-CM

## 2020-06-24 DIAGNOSIS — R21 RASH: Primary | ICD-10-CM

## 2020-06-24 DIAGNOSIS — I10 ESSENTIAL HYPERTENSION: ICD-10-CM

## 2020-06-24 DIAGNOSIS — Z12.31 ENCOUNTER FOR SCREENING MAMMOGRAM FOR BREAST CANCER: ICD-10-CM

## 2020-06-24 DIAGNOSIS — E78.5 HYPERLIPIDEMIA, UNSPECIFIED HYPERLIPIDEMIA TYPE: ICD-10-CM

## 2020-06-24 DIAGNOSIS — E03.9 ACQUIRED HYPOTHYROIDISM: ICD-10-CM

## 2020-06-24 DIAGNOSIS — Z87.09 HISTORY OF RESPIRATORY TRACT INFECTION: ICD-10-CM

## 2020-06-24 DIAGNOSIS — E55.9 VITAMIN D DEFICIENCY: ICD-10-CM

## 2020-06-24 NOTE — PROGRESS NOTES
Sherren Ales is a 53 y.o. female who was seen by synchronous (real-time) audio-video technology on 6/24/2020. Assessment & Plan:   1. Health Maintenance:   Ordering a mammogram to screen for breast cancer  For later this year  Ordering a COVID antibody test per patient request.    ORDERS:  - BRIANNA 3D MCKENNA W MAMMO BI SCREENING INCL CAD; Future  - SARS-COV-2 AB, IGG    2. Acquired hypothyroidism: Stable. [address] labs. Continue with Rx as prescribed. ORDERS:  - CBC WITH AUTOMATED DIFF; Future  - METABOLIC PANEL, COMPREHENSIVE; Future  - TSH AND FREE T4; Future    3. HLD and IFG (impaired fasting glucose) Hx:   Checking labs. Continue with Rx as prescribed. I encouraged her to maintain a diet low in processed foods. We will recheck her weight at a follow-up appointment. ORDERS:  - METABOLIC PANEL, COMPREHENSIVE; Future  - HEMOGLOBIN A1C W/O EAG; Future  - LIPID PANEL; Future    4. Rash: Etiology is unknown. Referral to Dermatology for evaluation. 5. Rosacea: Stable. Okay to continue with doxycycline as prescribed. Lab review: labs are reviewed in the EHR and ordered as mentioned above. I have discussed the diagnosis with the patient and the intended plan as seen in the above orders. I have discussed medication side effects and warnings with the patient as well. I have reviewed the plan of care with the patient, accepted their input and they are in agreement with the treatment goals. All questions were answered. The patient understands the plan of care. Pt instructed if symptoms worsen to call the office or report to the ED for continued care. Greater than 50% of the visit time was spent in counseling and/or coordination of care. Voice recognition was used to generate this report, which may have resulted in some phonetic based errors in grammar and contents.  Even though attempts were made to correct all the mistakes, some may have been missed, and remained in the body of the document. Subjective:   Jose Merritt was seen for   Chief Complaint   Patient presents with    Rash     The pt is a 53yo female with h/o HTN, hypothyroidism, [place] CMO, mitral valve prolapse, rosecea, hidradenitis suppurativa, GERD, thyroid nodules, HLD, and acne vulgaris.     1. Hypothyroidism: Taking Synthroid. Overdue for labs. 2. Rash: for 4-5 wks she reports feeling sick with \"head congestion,\" HA, left thigh rash, CP, back pain, and right knee rash. No alleviating factors were known. No relief of her HA with tylenol. Associated sx included nausea. She thought her sx were from allergies initially. She began taking claritin. Her rash along her knee/thigh resolved but she developed a rash along her toes, burning in quality. She was recently tested for Covid-19 and found to be negative for acute infection. Eventually, her  developed similar sx. She had similar sx along two fingers on her right hand. No pruritus. This has never happened before. \"It was not raised rash. \" The pain along her toes and fingers resolved but she has some skin darkening along the areas of the rash. She has some residual tingling along the rash area. Her sx are improving though. She is not interested in seeing Dermatology at this time for the residual rash sx. 3. HLD: Taking simvastatin. Overdue for labs. She used ot be on HTN rx but after aggressive lifestyle modification measures and weight loss, her BP normalized w/o medication. Her BP is <140/90 when she has checked it at home. 4. Rosacea: Treated with doxycycline. It's working well to control her rosacea. 5. Health Maintenance:  - Her mammogram is due in September. Earlier this year, the patient was exposed to others who had a respiratory tract infection. She is concerned she may have been exposed to Covid.          Prior to Admission medications    Medication Sig Start Date End Date Taking?  Authorizing Provider   levothyroxine (SYNTHROID) 125 mcg tablet Take 1 Tab by mouth Daily (before breakfast). 3/19/20   Sarah Hutton MD   simvastatin (ZOCOR) 20 mg tablet Take 1 Tab by mouth nightly. 3/19/20   Sarah Hutton MD   doxycycline (MONODOX) 100 mg capsule Take 1 Cap by mouth two (2) times a day. 3/19/20   Sarah Hutton MD   methylPREDNISolone (MEDROL DOSEPACK) 4 mg tablet Take as directed on pack 7/30/19   Francesco Avery NP   triamcinolone acetonide (KENALOG) 0.1 % ointment Apply  to affected area two (2) times a day. use thin layer 7/30/19   Francesco Avery NP   cholecalciferol, vitamin D3, (VITAMIN D3 PO) Take  by mouth daily. Provider, Historical   clindamycin (CLEOCIN T) 1 % external solution use thin film on affected area BID 9/18/17   Yulisa Moncada MD   multivitamin (ONE A DAY) tablet Take 1 Tab by mouth daily.  Indications: VITAMIN DEFICIENCY PREVENTION    Provider, Historical     No Known Allergies  Past Medical History:   Diagnosis Date    GERD (gastroesophageal reflux disease)     Mitral valve prolapse     Other ill-defined conditions(799.89)     mitral valve prolapse    Thyroid disease     hypo     Past Surgical History:   Procedure Laterality Date    HX GYN      ov cyst    HX GYN      d & c's    HX HYSTERECTOMY      HX OTHER SURGICAL       Family History   Problem Relation Age of Onset    Thyroid Disease Mother     Heart Disease Father     Hypertension Father     Diabetes Father     Cataract Brother      Social History     Socioeconomic History    Marital status:      Spouse name: Not on file    Number of children: Not on file    Years of education: Not on file    Highest education level: Not on file   Tobacco Use    Smoking status: Former Smoker     Years: 4.00     Types: Cigarettes    Smokeless tobacco: Never Used   Substance and Sexual Activity    Alcohol use: Yes     Comment: occasionally    Drug use: No    Sexual activity: Yes     Partners: Male     Birth control/protection: None, Surgical       ROS:  Gen: No fever/chills  HEENT: No sore throat, eye pain, ear pain, or congestion.  No HA  CV: No CP  Resp: No cough/SOB  GI: No abdominal pain  : No hematuria/dysuria  Derm: + rash - see HPI  Neuro: See HPI about tingling sensations  Musc: No new myalgias/jt pain  Psych: No depression sx were discussed today      Objective:     General: alert, cooperative, no distress   Mental  status: mental status: alert, oriented to person, place, and time, normal mood, behavior, speech, dress, motor activity, and thought processes   Resp: resp: normal effort and no respiratory distress   Neuro: neuro: no gross deficits   Skin: skin: no discoloration or lesions of concern on visible areas but she has some nonerythematous flat hyperpigmented macules along her toes and fingers     LABS:  Lab Results   Component Value Date/Time    Sodium 143 05/24/2019 11:19 AM    Potassium 4.2 05/24/2019 11:19 AM    Chloride 110 05/24/2019 11:19 AM    CO2 26 05/24/2019 11:19 AM    Anion gap 5 01/16/2019 01:17 PM    Glucose 91 05/24/2019 11:19 AM    BUN 8 05/24/2019 11:19 AM    Creatinine 0.68 05/24/2019 11:19 AM    BUN/Creatinine ratio NOT APPLICABLE 69/69/7704 24:76 AM    GFR est  05/24/2019 11:19 AM    GFR est non- 05/24/2019 11:19 AM    Calcium 9.5 05/24/2019 11:19 AM       Lab Results   Component Value Date/Time    Cholesterol, total 164 05/24/2019 11:19 AM    HDL Cholesterol 47 (L) 05/24/2019 11:19 AM    LDL-CHOLESTEROL 98 05/24/2019 11:19 AM    Triglyceride 95 05/24/2019 11:19 AM    Cholesterol/HDL ratio 3.5 05/24/2019 11:19 AM       Lab Results   Component Value Date/Time    WBC 3.7 (L) 05/24/2019 11:19 AM    HGB 14.1 05/24/2019 11:19 AM    HCT 41.7 05/24/2019 11:19 AM    PLATELET 425 75/24/7597 11:19 AM    MCV 99.5 05/24/2019 11:19 AM       Lab Results   Component Value Date/Time    Hemoglobin A1c 4.7 05/24/2019 11:19 AM       Lab Results   Component Value Date/Time    TSH 2.68 05/24/2019 11:19 AM Due to this being a TeleHealth  evaluation, many elements of the physical examination are unable to be assessed. The pt was seen by synchronous (real-time) audio-video technology, and/or her healthcare decision maker, is aware that this patient-initiated, Telehealth encounter is a billable service, with coverage as determined by her insurance carrier. She is aware that she may receive a bill and has provided verbal consent to proceed: Yes. The pt is being evaluated by a video visit encounter for concerns as above. A caregiver was present when appropriate. Due to this being a TeleHealth encounter (During PZKQU-13 public health emergency), evaluation of the following organ systems was limited: Vitals/Constitutional/EENT/Resp/CV/GI//MS/Neuro/Skin/Heme-Lymph-Imm. Pursuant to the emergency declaration under the [facility] waiver authority and the [employer] Act, this Virtual  Visit was conducted, with patient's (and/or legal guardian's) consent, to reduce the patient's risk of exposure to COVID-19 and provide necessary medical care. Services were provided through a video synchronous discussion virtually to substitute for in-person clinic visit. Patient and provider were located at their individual homes. We discussed the expected course, resolution and complications of the diagnosis(es) in detail. Medication risks, benefits, costs, interactions, and alternatives were discussed as indicated. I advised her to contact the office if her condition worsens, changes or fails to improve as anticipated. She expressed understanding with the diagnosis(es) and plan.      Venus Escalante MD

## 2020-06-24 NOTE — TELEPHONE ENCOUNTER
----- Message from Preston Guaman MD sent at 6/23/2020  3:32 PM EDT -----  Please let her know that she screened negative for Covid-19 per her lab result.     Dr. John Max  [facility] [address], [address] [place] [address] Str.  Phone: [phone]  Fax: [phone]

## 2020-07-01 ENCOUNTER — LAB ONLY (OUTPATIENT)
Dept: INTERNAL MEDICINE CLINIC | Age: 50
End: 2020-07-01

## 2020-07-01 DIAGNOSIS — Z87.09 HISTORY OF RESPIRATORY TRACT INFECTION: Primary | ICD-10-CM

## 2020-07-01 DIAGNOSIS — R73.01 IFG (IMPAIRED FASTING GLUCOSE): ICD-10-CM

## 2020-07-01 DIAGNOSIS — I10 ESSENTIAL HYPERTENSION: ICD-10-CM

## 2020-07-01 DIAGNOSIS — E78.5 HYPERLIPIDEMIA, UNSPECIFIED HYPERLIPIDEMIA TYPE: ICD-10-CM

## 2020-07-01 DIAGNOSIS — E55.9 VITAMIN D DEFICIENCY: ICD-10-CM

## 2020-07-01 DIAGNOSIS — E03.9 ACQUIRED HYPOTHYROIDISM: ICD-10-CM

## 2020-07-02 ENCOUNTER — TELEPHONE (OUTPATIENT)
Dept: INTERNAL MEDICINE CLINIC | Age: 50
End: 2020-07-02

## 2020-07-02 DIAGNOSIS — R74.01 TRANSAMINITIS: Primary | ICD-10-CM

## 2020-07-02 LAB
ABSOLUTE BANDS, 67058: ABNORMAL
ABSOLUTE BLASTS: ABNORMAL
ABSOLUTE METAMYELOCYTES, 900360: ABNORMAL
ABSOLUTE MYELOCYTES: ABNORMAL
ABSOLUTE NRBC,ANRBC: ABNORMAL
ABSOLUTE PROMYELOCYTES: ABNORMAL
ALB/GLOBRATIO, 58C: 2 (CALC) (ref 1–2.5)
ALBUMIN SERPL-MCNC: 4.3 G/DL (ref 3.6–5.1)
ALKALINE PHOSPHATASE, TOTAL, 25002000: 61 U/L (ref 31–125)
ALT SERPL-CCNC: 54 U/L (ref 6–29)
AST SERPL W P-5'-P-CCNC: 40 U/L (ref 10–35)
BANDS,BANDS: ABNORMAL
BASOPHILS # BLD: 49 CELLS/UL (ref 0–200)
BASOPHILS NFR BLD: 1.2 %
BILIRUB SERPL-MCNC: 0.6 MG/DL (ref 0.2–1.2)
BLASTS,BLAST: ABNORMAL
BUN SERPL-MCNC: 7 MG/DL (ref 7–25)
BUN/CREATININE RATIO,BUCR: ABNORMAL (CALC) (ref 6–22)
CALCIUM SERPL-MCNC: 9.6 MG/DL (ref 8.6–10.2)
CHLORIDE SERPL-SCNC: 106 MMOL/L (ref 98–110)
CHOL/HDL RATIO,CHHDX: 3.2 (CALC)
CHOLEST SERPL-MCNC: 175 MG/DL
CO2 SERPL-SCNC: 28 MMOL/L (ref 20–32)
COMMENT(S): ABNORMAL
CREAT SERPL-MCNC: 0.62 MG/DL (ref 0.5–1.1)
DEPRECATED FTI SERPL-MCNC: NORMAL UG/DL
EOSINOPHIL # BLD: 119 CELLS/UL (ref 15–500)
EOSINOPHIL NFR BLD: 2.9 %
ERYTHROCYTE [DISTWIDTH] IN BLOOD BY AUTOMATED COUNT: 11.5 % (ref 11–15)
GLOBULIN,GLOB: 2.2 G/DL (CALC) (ref 1.9–3.7)
GLUCOSE SERPL-MCNC: 85 MG/DL (ref 65–99)
HBA1C MFR BLD HPLC: 4.8 % OF TOTAL HGB
HCT VFR BLD AUTO: 44.5 % (ref 35–45)
HDLC SERPL-MCNC: 54 MG/DL
HGB BLD-MCNC: 15.2 G/DL (ref 11.7–15.5)
LDL-CHOLESTEROL: 100 MG/DL (CALC)
LYMPHOCYTES # BLD: 1853 CELLS/UL (ref 850–3900)
LYMPHOCYTES NFR BLD: 45.2 %
MCH RBC QN AUTO: 34.2 PG (ref 27–33)
MCHC RBC AUTO-ENTMCNC: 34.2 G/DL (ref 32–36)
MCV RBC AUTO: 100.2 FL (ref 80–100)
METAMYELOCYTES,METAS: ABNORMAL
MONOCYTES # BLD: 435 CELLS/UL (ref 200–950)
MONOCYTES NFR BLD: 10.6 %
MYELOCYTES,MYELO: ABNORMAL
NEUTROPHILS # BLD AUTO: 1644 CELLS/UL (ref 1500–7800)
NEUTROPHILS # BLD: 40.1 %
NON-HDL CHOLESTEROL, 011976: 121 MG/DL (CALC)
NRBC: ABNORMAL
PLATELET # BLD AUTO: 269 THOUSAND/UL (ref 140–400)
PMV BLD AUTO: 9.4 FL (ref 7.5–12.5)
POTASSIUM SERPL-SCNC: 4.3 MMOL/L (ref 3.5–5.3)
PROMYELOCYTES,PRO: ABNORMAL
PROT SERPL-MCNC: 6.5 G/DL (ref 6.1–8.1)
RBC # BLD AUTO: 4.44 MILLION/UL (ref 3.8–5.1)
REACTIVE LYMPHS: ABNORMAL
SARS-COV-2 AB, IGG, CORG1M: NEGATIVE
SODIUM SERPL-SCNC: 141 MMOL/L (ref 135–146)
T4 SERPL-MCNC: 9.2 MCG/DL (ref 5.1–11.9)
TRIGL SERPL-MCNC: 114 MG/DL (ref ?–150)
TSH W/REFLEX TO FT4: 0.71 MIU/L
WBC # BLD AUTO: 4.1 THOUSAND/UL (ref 3.8–10.8)

## 2020-07-02 NOTE — PROGRESS NOTES
Please let her know that her total cholesterol is 175. HDL is 54. Her triglycerides are 114. Her LDL is 100. We will, her liver function tests are abnormal with her AST being mildly elevated at 40 and her ALT being mildly elevated at 54. Her creatinine and kidney function is normal.  Her A1c is normal.  It is 4.8. Her thyroid function is normal as well. I am ordering a right upper quadrant ultrasound for her again at her convenience as I suspect that her transaminitis is simply from fatty liver disease (SOLORIO -alcohol steatohepatitis)in which regular exercise and maintaining a heart healthy low-fat diet, will resolve his issue.     Dr. Tamika Fish  [facility], [address] [place] [address], [facility] Str.  Phone: [phone]  Fax: [phone]

## 2020-07-02 NOTE — PROGRESS NOTES
Please let her know that she tested negative for the Covid-19 Ab test.    Dr. Dion Quintero  [facility], [address] [place] [address], [facility]  Phone: [phone]  Fax: [phone]

## 2020-07-02 NOTE — PROGRESS NOTES
Please let her know that her total cholesterol is 175.  HDL is 54.  Her triglycerides are 114.  Her LDL is 100.  We will, her liver function tests are abnormal with her AST being mildly elevated at 40 and her ALT being mildly elevated at 54.  Her creatinine and kidney function is normal.  Her A1c is normal.  It is 4.8.  Her thyroid function is normal as well.  I am ordering a right upper quadrant ultrasound for her again at her convenience as I suspect that her transaminitis is simply from fatty liver disease (SOLORIO -alcohol steatohepatitis)-in which regular exercise and maintaining a heart healthy low-fat diet, will resolve this issue. Additionally, she could also have a mild transaminitis from her simvastatin. We will continue to monitor her LFTs while on this medication. Her LFTs will have to worsen significantly/dramatically more in order to justify stopping her simvastatin. For now, she should continue taking that medication in addition to all her other meds.     Dr. Jozef Duckworth  [facility], [address] [place] [address], [address] [place] [address] Str.  Phone: [phone]  Fax: [phone]

## 2020-07-06 NOTE — TELEPHONE ENCOUNTER
----- Message from Nicolle Langford MD sent at 7/2/2020  9:27 AM EDT -----  Please let her know that she tested negative for the Covid-19 Ab test.    Dr. Gregory Maddox  [facility] [address], [address] [place] [address] Str.  Phone: [phone]  Fax: [phone]
Daniel Wong MD  [facility]             Please let her know that her total cholesterol is 175.  HDL is 54.  Her triglycerides are 114.  Her LDL is 100.  We will, her liver function tests are abnormal with her AST being mildly elevated at 40 and her ALT being mildly elevated at 47.  Her creatinine and kidney function is normal.  Her A1c is normal.  It is 4.8.  Her thyroid function is normal as well.  I am ordering a right upper quadrant ultrasound for her again at her convenience as I suspect that her transaminitis is simply from fatty liver disease (SOLORIO -alcohol steatohepatitis)-in which regular exercise and maintaining a heart healthy low-fat diet, will resolve this issue.  Additionally, she could also have a mild transaminitis from her simvastatin.  We will continue to monitor her LFTs while on this medication. Lizzy Heller LFTs will have to worsen significantly/dramatically more in order to justify stopping her simvastatin.  For now, she should continue taking that medication in addition to all her other meds.           SANTIAGOTCB
LMTCB
Pt aware of message below and verbalized understanding. No further questions or concerns from pt at this time.
same name as above

## 2020-07-10 ENCOUNTER — HOSPITAL ENCOUNTER (OUTPATIENT)
Dept: ULTRASOUND IMAGING | Age: 50
Discharge: HOME | End: 2020-07-10
Attending: INTERNAL MEDICINE
Payer: COMMERCIAL

## 2020-07-10 DIAGNOSIS — R74.01 TRANSAMINITIS: ICD-10-CM

## 2020-07-10 PROBLEM — K75.81 NASH (NONALCOHOLIC STEATOHEPATITIS): Status: ACTIVE | Noted: 2020-07-10

## 2020-07-10 PROCEDURE — 76705 ECHO EXAM OF ABDOMEN: CPT

## 2020-07-10 NOTE — PROGRESS NOTES
Please let her know that her transaminitis is likely from fatty liver disease or SOLORIO (nonalcoholic steatohepatitis). She needs to reduce her processed food and fatty food intake in order to resolve this issue. I will recheck her LFTs in a year.     Dr. Davida Stewart  [facility] [address], [address] [place] [address] Str.  Phone: [phone]  Fax: [phone]

## 2020-07-13 NOTE — PROGRESS NOTES
Patient is awareher transaminitis is likely from fatty liver disease or SOLORIO (nonalcoholic steatohepatitis). She needs to reduce her processed food and fatty food intake in order to resolve this issue. I will recheck her LFTs in a year. Patient verbalizes understanding.

## 2020-09-14 ENCOUNTER — HOSPITAL ENCOUNTER (OUTPATIENT)
Dept: MAMMOGRAPHY | Age: 50
Discharge: HOME | End: 2020-09-14
Attending: INTERNAL MEDICINE
Payer: COMMERCIAL

## 2020-09-14 DIAGNOSIS — Z12.31 ENCOUNTER FOR SCREENING MAMMOGRAM FOR BREAST CANCER: ICD-10-CM

## 2020-09-14 PROCEDURE — 77063 BREAST TOMOSYNTHESIS BI: CPT

## 2020-11-25 DIAGNOSIS — L73.2 SUPPURATIVE HIDRADENITIS: ICD-10-CM

## 2020-11-25 DIAGNOSIS — L71.9 ROSACEA: ICD-10-CM

## 2020-11-25 DIAGNOSIS — E78.00 PURE HYPERCHOLESTEROLEMIA: ICD-10-CM

## 2020-11-25 DIAGNOSIS — E03.9 ACQUIRED HYPOTHYROIDISM: ICD-10-CM

## 2020-11-30 RX ORDER — DOXYCYCLINE 100 MG/1
CAPSULE ORAL
Qty: 180 CAP | Refills: 1 | Status: SHIPPED | OUTPATIENT
Start: 2020-11-30 | End: 2021-06-01

## 2020-11-30 RX ORDER — LEVOTHYROXINE SODIUM 125 UG/1
TABLET ORAL
Qty: 90 TAB | Refills: 1 | Status: SHIPPED | OUTPATIENT
Start: 2020-11-30 | End: 2021-06-01

## 2020-11-30 RX ORDER — SIMVASTATIN 20 MG/1
TABLET, FILM COATED ORAL
Qty: 90 TAB | Refills: 1 | Status: SHIPPED | OUTPATIENT
Start: 2020-11-30 | End: 2021-06-01

## 2021-01-01 ENCOUNTER — NURSE TRIAGE (OUTPATIENT)
Dept: OTHER | Facility: CLINIC | Age: 51
End: 2021-01-01

## 2021-01-01 ENCOUNTER — APPOINTMENT (OUTPATIENT)
Dept: GENERAL RADIOLOGY | Age: 51
End: 2021-01-01
Attending: EMERGENCY MEDICINE
Payer: COMMERCIAL

## 2021-01-01 ENCOUNTER — HOSPITAL ENCOUNTER (EMERGENCY)
Age: 51
Discharge: HOME | End: 2021-01-01
Attending: EMERGENCY MEDICINE
Payer: COMMERCIAL

## 2021-01-01 VITALS
DIASTOLIC BLOOD PRESSURE: 88 MMHG | BODY MASS INDEX: 23.3 KG/M2 | HEART RATE: 92 BPM | OXYGEN SATURATION: 99 % | WEIGHT: 145 LBS | TEMPERATURE: 99 F | RESPIRATION RATE: 18 BRPM | SYSTOLIC BLOOD PRESSURE: 183 MMHG | HEIGHT: 66 IN

## 2021-01-01 DIAGNOSIS — R03.0 ELEVATED BLOOD PRESSURE READING: ICD-10-CM

## 2021-01-01 DIAGNOSIS — S39.012A STRAIN OF LUMBAR REGION, INITIAL ENCOUNTER: Primary | ICD-10-CM

## 2021-01-01 DIAGNOSIS — S30.0XXA LUMBAR CONTUSION, INITIAL ENCOUNTER: ICD-10-CM

## 2021-01-01 PROCEDURE — 72110 X-RAY EXAM L-2 SPINE 4/>VWS: CPT

## 2021-01-01 PROCEDURE — 74011250637 HC RX REV CODE- 250/637: Performed by: EMERGENCY MEDICINE

## 2021-01-01 PROCEDURE — 99283 EMERGENCY DEPT VISIT LOW MDM: CPT

## 2021-01-01 RX ORDER — CLOBETASOL PROPIONATE 0.5 MG/G
OINTMENT TOPICAL 2 TIMES DAILY
COMMUNITY
End: 2022-09-02

## 2021-01-01 RX ORDER — CYCLOBENZAPRINE HCL 10 MG
10 TABLET ORAL
Qty: 15 TAB | Refills: 0 | Status: SHIPPED | OUTPATIENT
Start: 2021-01-01 | End: 2021-12-23

## 2021-01-01 RX ORDER — CYCLOBENZAPRINE HCL 10 MG
10 TABLET ORAL
Status: COMPLETED | OUTPATIENT
Start: 2021-01-01 | End: 2021-01-01

## 2021-01-01 RX ORDER — IBUPROFEN 800 MG/1
800 TABLET ORAL
Qty: 20 TAB | Refills: 0 | Status: SHIPPED | OUTPATIENT
Start: 2021-01-01 | End: 2022-09-02

## 2021-01-01 RX ADMIN — CYCLOBENZAPRINE 10 MG: 10 TABLET, FILM COATED ORAL at 18:35

## 2021-01-01 NOTE — ED TRIAGE NOTES
Patient states experiencing fall today that was caused by her Pitbull puppy. She c/o pain to lower back, sacrum and left hip. Denies striking head or LOC during fall. She states being brought to ER by her .

## 2021-01-01 NOTE — Clinical Note
[facility] EMERGENCY DEPT 
[address] [facility] [id number] [phone] Work/School Note Date: 1/1/2021 To Whom It May concern: 
 
Humza Stern was seen and treated today in the emergency room by the following provider(s): 
Attending Provider: Ramila Marrufo MD.   
 
Humza Stern is excused from work/school on 01/01/21 and 01/02/21. She is medically clear to return to work/school on 1/3/2021. Sincerely, Ann Marinelli MD

## 2021-01-01 NOTE — ED PROVIDER NOTES
45-year-old female history of GERD and hypothyroidism presents for evaluation of low back pain post fall. She was holding a dog by leash while standing out on the front porch. The dog took off the porch to afshan after another family member. This caused her to be thrown over a table down onto a concrete surface. She landed on her left side and rolled. She has small abrasion to the left knee and mild discomfort about the shoulder and elbow on the left side. Primary of current pain is the lumbar region of the spine extending into the left posterior superior iliac crest.  No radicular pain. Should not strike her head or neck. No headache or vomiting. Other injuries reported. Initially had significant pain after the fall. She took ibuprofen 800 mg and is now feeling somewhat better but still is having paroxysms of spasmodic pain when she attempts to bend or move.            Past Medical History:   Diagnosis Date    GERD (gastroesophageal reflux disease)     Mitral valve prolapse     Other ill-defined conditions(429.35)     mitral valve prolapse    Thyroid disease     hypo       Past Surgical History:   Procedure Laterality Date    HX GYN      ov cyst    HX GYN      d & c's    HX HYSTERECTOMY      HX OTHER SURGICAL           Family History:   Problem Relation Age of Onset    Thyroid Disease Mother     Heart Disease Father     Hypertension Father     Diabetes Father     Cataract Brother        Social History     Socioeconomic History    Marital status:      Spouse name: Not on file    Number of children: Not on file    Years of education: Not on file    Highest education level: Not on file   Occupational History    Not on file   Social Needs    Financial resource strain: Not on file    Food insecurity     Worry: Not on file     Inability: Not on file    Transportation needs     Medical: Not on file     Non-medical: Not on file   Tobacco Use    Smoking status: Former Smoker     Years: 4.00     Types: Cigarettes    Smokeless tobacco: Never Used   Substance and Sexual Activity    Alcohol use: Not Currently     Comment: occasionally    Drug use: No    Sexual activity: Yes     Partners: Male     Birth control/protection: None, Surgical   Lifestyle    Physical activity     Days per week: Not on file     Minutes per session: Not on file    Stress: Not on file   Relationships    Social connections     Talks on phone: Not on file     Gets together: Not on file     Attends Jehovah's witness service: Not on file     Active member of club or organization: Not on file     Attends meetings of clubs or organizations: Not on file     Relationship status: Not on file    Intimate partner violence     Fear of current or ex partner: Not on file     Emotionally abused: Not on file     Physically abused: Not on file     Forced sexual activity: Not on file   Other Topics Concern    Not on file   Social History Narrative    Not on file         ALLERGIES: Patient has no known allergies. Review of Systems   Respiratory: Negative for shortness of breath. Cardiovascular: Negative for chest pain. Musculoskeletal: Positive for back pain and joint swelling. Neurological: Negative for seizures and headaches. All other systems reviewed and are negative. Vitals:    01/01/21 1707   BP: (!) 183/88   Pulse: 92   Resp: 18   Temp: 99 °F (37.2 °C)   SpO2: 99%   Weight: 65.8 kg (145 lb)   Height: 5' 5.5\" (1.664 m)            Physical Exam  Vitals signs and nursing note reviewed. Constitutional:       General: She is not in acute distress. Appearance: She is well-developed. HENT:      Head: Normocephalic and atraumatic. Eyes:      General: No scleral icterus. Cardiovascular:      Rate and Rhythm: Normal rate. Pulmonary:      Effort: Pulmonary effort is normal.   Musculoskeletal:      Comments: Small superficial abrasion noted to anterior lateral aspect of the left knee. No ligamentous laxity.   No joint effusion. Mild tenderness over the left femoral on elbow region. Normal range of motion. Tender to touch over the lumbar spine extending into the left posterior superior iliac crest.  No bony deformity. No palpable spasm. Skin:     General: Skin is warm and dry. Neurological:      Mental Status: She is alert and oriented to person, place, and time. Lumbar spine films reviewed per myself, mild degenerative changes, no acute fracture. MDM  Number of Diagnoses or Management Options  Diagnosis management comments: Impression: Acute contusions now associated with spasmodic pain in the lumbar spine. Treated with Flexeril 10 mg orally. LS-spine films reviewed. Lyle symptomatic treatment. Procedures    Diagnosis:   1. Strain of lumbar region, initial encounter    2. Lumbar contusion, initial encounter    3. Elevated blood pressure reading      1. Preliminary reading of x-rays negative for acute fracture. 2.  Apply ice packs to local areas of pain and swelling. 3.  Ibuprofen 800 mg 3 times a day for pain and inflammation. 4.  Flexeril 10 mg every 8 hours as needed for muscle spasm. 5.  Follow-up with your primary care physician in 7 to 10 days for recheck for any ongoing symptoms. 6.  Return emergency department for severe pain, loss of bowel or bladder control, pain or weakness radiating to the lower extremity, or other acutely worsening medical symptoms. 7.  Blood pressure elevated to 183/88 (normal less than 130/80). Follow-up with primary care physician for repeat blood pressure check in 7 to 10 days.     Disposition: home    Follow-up Information     Follow up With Specialties Details Why Contact Info    Kenzie Varghese MD Family Medicine Schedule an appointment as soon as possible for a visit in 1 week As needed, for recheck of ongoing symptoms, for repeat blood pressure check [facility] [address] [facility] [address]  [address] [place] [address]  [phone] [id number] [facility] EMERGENCY DEPT Emergency Medicine  As needed, If symptoms worsen [address] [facility]  [phone]          Patient's Medications   Start Taking    CYCLOBENZAPRINE (FLEXERIL) 10 MG TABLET    Take 1 Tab by mouth three (3) times daily as needed for Muscle Spasm(s). IBUPROFEN (MOTRIN) 800 MG TABLET    Take 1 Tab by mouth every eight (8) hours as needed for Pain (with food). Continue Taking    CHOLECALCIFEROL, VITAMIN D3, (VITAMIN D3 PO)    Take  by mouth daily. CLOBETASOL (TEMOVATE) 0.05 % OINTMENT    Apply  to affected area two (2) times a day. DOXYCYCLINE (MONODOX) 100 MG CAPSULE    TAKE 1 CAPSULE BY MOUTH 2 TIMES A DAY    LEVOTHYROXINE (SYNTHROID) 125 MCG TABLET    TAKE 1 TABLET BY MOUTH ONE TIME A DAY BEFORE BREAKFAST    MULTIVITAMIN (ONE A DAY) TABLET    Take 1 Tab by mouth daily. Indications: VITAMIN DEFICIENCY PREVENTION    SIMVASTATIN (ZOCOR) 20 MG TABLET    TAKE ONE TABLET BY MOUTH NIGHTLY   These Medications have changed    No medications on file   Stop Taking    CLINDAMYCIN (CLEOCIN T) 1 % EXTERNAL SOLUTION    use thin film on affected area BID    METHYLPREDNISOLONE (MEDROL DOSEPACK) 4 MG TABLET    Take as directed on pack    TRIAMCINOLONE ACETONIDE (KENALOG) 0.1 % OINTMENT    Apply  to affected area two (2) times a day.  use thin layer

## 2021-01-01 NOTE — DISCHARGE INSTRUCTIONS
1.  Preliminary reading of x-rays negative for acute fracture. 2.  Apply ice packs to local areas of pain and swelling. 3.  Ibuprofen 800 mg 3 times a day for pain and inflammation. 4.  Flexeril 10 mg every 8 hours as needed for muscle spasm. 5.  Follow-up with your primary care physician in 7 to 10 days for recheck for any ongoing symptoms. 6.  Return emergency department for severe pain, loss of bowel or bladder control, pain or weakness radiating to the lower extremity, or other acutely worsening medical symptoms. 7.  Blood pressure elevated to 183/88 (normal less than 130/80). Follow-up with primary care physician for repeat blood pressure check in 1 to 2 weeks.

## 2021-01-01 NOTE — TELEPHONE ENCOUNTER
Reason for Disposition   Weakness of a leg or foot (e.g., unable to bear weight, dragging foot)    Answer Assessment - Initial Assessment Questions  1. MECHANISM: \"How did the injury happen? \" (Consider the possibility of domestic violence or elder abuse)      Nadeen Cameron after dog pulled her down while holding leash  2. ONSET: \"When did the injury happen? \" (Minutes or hours ago)      2 hours ago  3. LOCATION: \"What part of the back is injured? \"     Left side lower back/hip  4. SEVERITY: \"Can you move the back normally? \"      Very limited range of motion  5. PAIN: \"Is there any pain? \" If so, ask: \"How bad is the pain? \"   (Scale 1-10; or mild, moderate, severe)      10 with movement  6. CORD SYMPTOMS: Any weakness or numbness of the arms or legs? \"      Weakness in left leg  7. SIZE: For cuts, bruises, or swelling, ask: \"How large is it? \" (e.g., inches or centimeters)      Bruises to area, small    9. OTHER SYMPTOMS: \"Do you have any other symptoms? \" (e.g., abdominal pain, blood in urine)      no    Protocols used: BACK INJURY-ADULT-[place]    Caller agrees with discharge disposition. Care advice given. Thank you for allowing me to participate in the care of your patient. The  patient was connected to triage in response to information provided to the [facility]. Please do not respond through this encounter as the response is not directed to a shared pool.

## 2021-01-02 NOTE — ROUTINE PROCESS
Sherren Ales is a 53 y.o. female that was discharged in stable. Pt was accompanied by friend. Pt is not driving. The patients diagnosis, condition and treatment were explained to  patient and aftercare instructions were given. The patient verbalized understanding. Patient armband removed and shredded.

## 2021-05-31 DIAGNOSIS — L73.2 SUPPURATIVE HIDRADENITIS: ICD-10-CM

## 2021-05-31 DIAGNOSIS — L71.9 ROSACEA: ICD-10-CM

## 2021-05-31 DIAGNOSIS — E03.9 ACQUIRED HYPOTHYROIDISM: ICD-10-CM

## 2021-05-31 DIAGNOSIS — E78.00 PURE HYPERCHOLESTEROLEMIA: ICD-10-CM

## 2021-06-01 RX ORDER — LEVOTHYROXINE SODIUM 125 UG/1
TABLET ORAL
Qty: 90 TABLET | Refills: 1 | Status: SHIPPED | OUTPATIENT
Start: 2021-06-01 | End: 2021-12-05

## 2021-06-01 RX ORDER — DOXYCYCLINE 100 MG/1
CAPSULE ORAL
Qty: 180 CAPSULE | Refills: 1 | Status: SHIPPED | OUTPATIENT
Start: 2021-06-01 | End: 2021-08-30

## 2021-06-01 RX ORDER — SIMVASTATIN 20 MG/1
TABLET, FILM COATED ORAL
Qty: 90 TABLET | Refills: 1 | Status: SHIPPED | OUTPATIENT
Start: 2021-06-01 | End: 2021-12-05

## 2021-06-24 ENCOUNTER — APPOINTMENT (OUTPATIENT)
Dept: INTERNAL MEDICINE CLINIC | Age: 51
End: 2021-06-24

## 2021-06-24 DIAGNOSIS — L71.9 ROSACEA: ICD-10-CM

## 2021-06-24 DIAGNOSIS — E03.9 ACQUIRED HYPOTHYROIDISM: ICD-10-CM

## 2021-06-24 DIAGNOSIS — E78.5 HYPERLIPIDEMIA, UNSPECIFIED HYPERLIPIDEMIA TYPE: ICD-10-CM

## 2021-06-24 DIAGNOSIS — R73.01 IFG (IMPAIRED FASTING GLUCOSE): ICD-10-CM

## 2021-06-25 LAB
ALBUMIN SERPL-MCNC: 4.7 G/DL (ref 3.8–4.8)
ALBUMIN/GLOB SERPL: 2 {RATIO} (ref 1.2–2.2)
ALP SERPL-CCNC: 69 IU/L (ref 48–121)
ALT SERPL-CCNC: 64 IU/L (ref 0–32)
AST SERPL-CCNC: 47 IU/L (ref 0–40)
BASOPHILS # BLD AUTO: 0.1 X10E3/UL (ref 0–0.2)
BASOPHILS NFR BLD AUTO: 1 %
BILIRUB SERPL-MCNC: 0.4 MG/DL (ref 0–1.2)
BUN SERPL-MCNC: 7 MG/DL (ref 6–24)
BUN/CREAT SERPL: 10 (ref 9–23)
CALCIUM SERPL-MCNC: 9.4 MG/DL (ref 8.7–10.2)
CHLORIDE SERPL-SCNC: 104 MMOL/L (ref 96–106)
CHOLEST SERPL-MCNC: 202 MG/DL (ref 100–199)
CO2 SERPL-SCNC: 26 MMOL/L (ref 20–29)
CREAT SERPL-MCNC: 0.68 MG/DL (ref 0.57–1)
EOSINOPHIL # BLD AUTO: 0.1 X10E3/UL (ref 0–0.4)
EOSINOPHIL NFR BLD AUTO: 3 %
ERYTHROCYTE [DISTWIDTH] IN BLOOD BY AUTOMATED COUNT: 11.2 % (ref 11.7–15.4)
GLOBULIN SER CALC-MCNC: 2.3 G/DL (ref 1.5–4.5)
GLUCOSE SERPL-MCNC: 85 MG/DL (ref 65–99)
HBA1C MFR BLD: 5 % (ref 4.8–5.6)
HCT VFR BLD AUTO: 45.6 % (ref 34–46.6)
HDLC SERPL-MCNC: 50 MG/DL
HGB BLD-MCNC: 15.8 G/DL (ref 11.1–15.9)
IMM GRANULOCYTES # BLD AUTO: 0 X10E3/UL (ref 0–0.1)
IMM GRANULOCYTES NFR BLD AUTO: 0 %
IMP & REVIEW OF LAB RESULTS: NORMAL
LDLC SERPL CALC-MCNC: 123 MG/DL (ref 0–99)
LYMPHOCYTES # BLD AUTO: 2.2 X10E3/UL (ref 0.7–3.1)
LYMPHOCYTES NFR BLD AUTO: 48 %
MCH RBC QN AUTO: 35.3 PG (ref 26.6–33)
MCHC RBC AUTO-ENTMCNC: 34.6 G/DL (ref 31.5–35.7)
MCV RBC AUTO: 102 FL (ref 79–97)
MONOCYTES # BLD AUTO: 0.4 X10E3/UL (ref 0.1–0.9)
MONOCYTES NFR BLD AUTO: 9 %
NEUTROPHILS # BLD AUTO: 1.8 X10E3/UL (ref 1.4–7)
NEUTROPHILS NFR BLD AUTO: 39 %
PLATELET # BLD AUTO: 269 X10E3/UL (ref 150–450)
POTASSIUM SERPL-SCNC: 5.4 MMOL/L (ref 3.5–5.2)
PROT SERPL-MCNC: 7 G/DL (ref 6–8.5)
RBC # BLD AUTO: 4.48 X10E6/UL (ref 3.77–5.28)
SODIUM SERPL-SCNC: 141 MMOL/L (ref 134–144)
T4 SERPL-MCNC: 7.5 UG/DL (ref 4.5–12)
TRIGL SERPL-MCNC: 166 MG/DL (ref 0–149)
TSH SERPL DL<=0.005 MIU/L-ACNC: 2.87 UIU/ML (ref 0.45–4.5)
VLDLC SERPL CALC-MCNC: 29 MG/DL (ref 5–40)
WBC # BLD AUTO: 4.6 X10E3/UL (ref 3.4–10.8)

## 2021-06-28 DIAGNOSIS — R74.01 TRANSAMINITIS: Primary | ICD-10-CM

## 2021-06-28 DIAGNOSIS — E87.5 HYPERKALEMIA: ICD-10-CM

## 2021-06-28 NOTE — PROGRESS NOTES
Patient reached and made aware of following results and message per Dr. Aniyah Soriano:    I will discuss her results with her at her upcoming appointment. Her A1c is normal at 5. Her total cholesterol is up to a 2 from 175 a year ago. Her triglycerides are 166. Her HDL is 50. Her LDL is 123. Her CMP shows a mildly elevated potassium of 5.4. Her AST is elevated at 47 and her ALT is elevated at 64. Please have her get follow-up labs this week to reassess her liver function and potassium.    -  Patient verbalized understanding and was scheduled for labs on 7/2/21.

## 2021-06-28 NOTE — PROGRESS NOTES
I will discuss her results with her at her upcoming appointment. Her A1c is normal at 5. Her total cholesterol is up to a 2 from 175 a year ago. Her triglycerides are 166. Her HDL is 50. Her LDL is 123. Her CMP shows a mildly elevated potassium of 5.4. Her AST is elevated at 47 and her ALT is elevated at 64. Please have her get follow-up labs this week to reassess her liver function and potassium.     Dr. Dawood Saldana  [facility] [address], [address] [place] [address] Str.  Phone: [phone]  Fax: [phone]

## 2021-07-02 ENCOUNTER — APPOINTMENT (OUTPATIENT)
Dept: INTERNAL MEDICINE CLINIC | Age: 51
End: 2021-07-02

## 2021-07-02 DIAGNOSIS — R74.01 TRANSAMINITIS: ICD-10-CM

## 2021-07-02 DIAGNOSIS — E87.5 HYPERKALEMIA: ICD-10-CM

## 2021-07-03 LAB
ALBUMIN SERPL-MCNC: 4.4 G/DL (ref 3.8–4.8)
ALBUMIN/GLOB SERPL: 1.6 {RATIO} (ref 1.2–2.2)
ALP SERPL-CCNC: 70 IU/L (ref 48–121)
ALT SERPL-CCNC: 51 IU/L (ref 0–32)
AST SERPL-CCNC: 33 IU/L (ref 0–40)
BILIRUB SERPL-MCNC: 0.5 MG/DL (ref 0–1.2)
BUN SERPL-MCNC: 8 MG/DL (ref 6–24)
BUN/CREAT SERPL: 9 (ref 9–23)
CALCIUM SERPL-MCNC: 9.9 MG/DL (ref 8.7–10.2)
CHLORIDE SERPL-SCNC: 106 MMOL/L (ref 96–106)
CO2 SERPL-SCNC: 27 MMOL/L (ref 20–29)
CREAT SERPL-MCNC: 0.9 MG/DL (ref 0.57–1)
GLOBULIN SER CALC-MCNC: 2.7 G/DL (ref 1.5–4.5)
GLUCOSE SERPL-MCNC: 108 MG/DL (ref 65–99)
POTASSIUM SERPL-SCNC: 4.5 MMOL/L (ref 3.5–5.2)
PROT SERPL-MCNC: 7.1 G/DL (ref 6–8.5)
SODIUM SERPL-SCNC: 144 MMOL/L (ref 134–144)

## 2021-07-08 NOTE — PROGRESS NOTES
Toya Greco presents today for   Chief Complaint   Patient presents with    Physical     BE WELL VISIT    Hypertension    Thyroid Problem    Cholesterol Problem    Labs     7-2-21           Coordination of Care:  1. Have you been to the ER, urgent care clinic since your last visit? Hospitalized since your last visit? yes    2. Have you seen or consulted any other health care providers outside of the [address] [place] [address] since your last visit? Include any pap smears or colon screening.  yes

## 2021-07-09 ENCOUNTER — OFFICE VISIT (OUTPATIENT)
Dept: INTERNAL MEDICINE CLINIC | Age: 51
End: 2021-07-09
Payer: COMMERCIAL

## 2021-07-09 VITALS
HEART RATE: 80 BPM | WEIGHT: 149 LBS | DIASTOLIC BLOOD PRESSURE: 78 MMHG | RESPIRATION RATE: 16 BRPM | BODY MASS INDEX: 23.95 KG/M2 | HEIGHT: 66 IN | TEMPERATURE: 97.7 F | OXYGEN SATURATION: 99 % | SYSTOLIC BLOOD PRESSURE: 111 MMHG

## 2021-07-09 DIAGNOSIS — Z12.83 SKIN EXAM, SCREENING FOR CANCER: ICD-10-CM

## 2021-07-09 DIAGNOSIS — R73.01 IFG (IMPAIRED FASTING GLUCOSE): ICD-10-CM

## 2021-07-09 DIAGNOSIS — Z00.00 ROUTINE GENERAL MEDICAL EXAMINATION AT A HEALTH CARE FACILITY: Primary | ICD-10-CM

## 2021-07-09 DIAGNOSIS — E78.5 HYPERLIPIDEMIA, UNSPECIFIED HYPERLIPIDEMIA TYPE: ICD-10-CM

## 2021-07-09 DIAGNOSIS — Z12.11 SCREENING FOR COLON CANCER: ICD-10-CM

## 2021-07-09 DIAGNOSIS — K75.81 NASH (NONALCOHOLIC STEATOHEPATITIS): ICD-10-CM

## 2021-07-09 DIAGNOSIS — E03.9 ACQUIRED HYPOTHYROIDISM: ICD-10-CM

## 2021-07-09 DIAGNOSIS — Z12.39 ENCOUNTER FOR SCREENING FOR MALIGNANT NEOPLASM OF BREAST, UNSPECIFIED SCREENING MODALITY: ICD-10-CM

## 2021-07-09 DIAGNOSIS — L71.9 ROSACEA: ICD-10-CM

## 2021-07-09 DIAGNOSIS — L98.9 SKIN LESION: ICD-10-CM

## 2021-07-09 PROCEDURE — 99396 PREV VISIT EST AGE 40-64: CPT | Performed by: INTERNAL MEDICINE

## 2021-07-09 RX ORDER — ZOSTER VACCINE RECOMBINANT, ADJUVANTED 50 MCG/0.5
0.5 KIT INTRAMUSCULAR
Qty: 1 ML | Refills: 0 | Status: SHIPPED | OUTPATIENT
Start: 2021-07-09 | End: 2021-09-09

## 2021-07-09 RX ORDER — LORATADINE 10 MG/1
10 TABLET ORAL
COMMUNITY

## 2021-07-09 NOTE — PATIENT INSTRUCTIONS
Body Mass Index: Care Instructions  Your Care Instructions     Body mass index (BMI) can help you see if your weight is raising your risk for health problems. It uses a formula to compare how much you weigh with how tall you are. · A BMI lower than 18.5 is considered underweight. · A BMI between 18.5 and 24.9 is considered healthy. · A BMI between 25 and 29.9 is considered overweight. A BMI of 30 or higher is considered obese. If your BMI is in the normal range, it means that you have a lower risk for weight-related health problems. If your BMI is in the overweight or obese range, you may be at increased risk for weight-related health problems, such as high blood pressure, heart disease, stroke, arthritis or joint pain, and diabetes. If your BMI is in the underweight range, you may be at increased risk for health problems such as fatigue, lower protection (immunity) against illness, muscle loss, bone loss, hair loss, and hormone problems. BMI is just one measure of your risk for weight-related health problems. You may be at higher risk for health problems if you are not active, you eat an unhealthy diet, or you drink too much alcohol or use tobacco products. Follow-up care is a key part of your treatment and safety. Be sure to make and go to all appointments, and call your doctor if you are having problems. It's also a good idea to know your test results and keep a list of the medicines you take. How can you care for yourself at home? · Practice healthy eating habits. This includes eating plenty of fruits, vegetables, whole grains, lean protein, and low-fat dairy. · If your doctor recommends it, get more exercise. Walking is a good choice. Bit by bit, increase the amount you walk every day. Try for at least 30 minutes on most days of the week. · Do not smoke. Smoking can increase your risk for health problems. If you need help quitting, talk to your doctor about stop-smoking programs and medicines. These can increase your chances of quitting for good. · Limit alcohol to 2 drinks a day for men and 1 drink a day for women. Too much alcohol can cause health problems. If you have a BMI higher than 25  · Your doctor may do other tests to check your risk for weight-related health problems. This may include measuring the distance around your waist. A waist measurement of more than 40 inches in men or 35 inches in women can increase the risk of weight-related health problems. · Talk with your doctor about steps you can take to stay healthy or improve your health. You may need to make lifestyle changes to lose weight and stay healthy, such as changing your diet and getting regular exercise. If you have a BMI lower than 18.5  · Your doctor may do other tests to check your risk for health problems. · Talk with your doctor about steps you can take to stay healthy or improve your health. You may need to make lifestyle changes to gain or maintain weight and stay healthy, such as getting more healthy foods in your diet and doing exercises to build muscle. Where can you learn more? Go to http://ean-brandon.info/  Enter S176 in the search box to learn more about \"Body Mass Index: Care Instructions. \"  Current as of: September 23, 2020               Content Version: 12.8  © 2006-2021 [employer]. Care instructions adapted under license by [employer] (which disclaims liability or warranty for this information). If you have questions about a medical condition or this instruction, always ask your healthcare professional. [place] [address] any warranty or liability for your use of this information.

## 2021-07-09 NOTE — PROGRESS NOTES
[facility]:  7/9/2021, MRN: [id number]      Dre Bird is a 49 y.o. female and presents to clinic for Physical (BE WELL VISIT), Hypertension, Thyroid Problem, Cholesterol Problem, and Labs (7-2-21)    Subjective: The pt is a 53yo female with h/o HTN, hypothyroidism, [place] CMO, mitral valve prolapse, rosecea, hidradenitis suppurativa, GERD, thyroid nodules, HLD, and acne vulgaris.     1. Health Maintenance:  - Overdue for colon cancer screening  - A year ago, she fell down along her left leg just below her knee jt laterally; if something touches it, it hurts a little. The area was dark and is lightening up over time. Associated sx: itching.   - Shingrix vaccine overdue    2. Hypothyroidism: On Synthroid. Her last TFTs were WNL in June. 3. HLD: On zocor: Her recent labs show that her LDL is 123. She started dieting after getting her June lab results. 4. Transaminitis:  On a statin. She has a h/o NAFLD. She has cut out fast food and soda since her initial June lab results came back. BMI is 24.     7/10/20 RUQ Ultrasound: Top normal size common bile duct. Increased echogenicity of the liver. This is a nonspecific finding but is commonly seen with hepatic steatotic disorders. Limited visualization of the pancreas. Results for Ashleigh Retana (MRN [id number]) as of 7/9/2021 09:39   Ref. Range 6/24/2021 09:00 7/2/2021 12:58   ALT Latest Ref Range: 0 - 32 IU/L 64 (H) 51 (H)   AST Latest Ref Range: 0 - 40 IU/L 47 (H) 33     5. Rosacea, Skin Lesion, and Skin Cancer Screening: Well controlled with doxycycline. She has a h/o HS and acne vulgaris. She has an area along her left leg posteriorly that's been there for several wks. It is occasionally itchy but there is no pain.  She was prescribed clobetasol topical Rx for this condition in the past by dermatologist.  Meanwhile, she has multiple freckles.         Patient Active Problem List    Diagnosis Date Noted    SOLORIO (nonalcoholic steatohepatitis) 07/10/2020    Rosacea 06/24/2019    Vitamin D deficiency 04/11/2018    Acquired hypothyroidism 02/28/2018    Essential hypertension 02/28/2018    GERD (gastroesophageal reflux disease) 02/28/2018    Hyperlipidemia 02/28/2018    Acne vulgaris 02/28/2018    Mitral valve prolapse 02/28/2018    IFG (impaired fasting glucose) 02/28/2018       Current Outpatient Medications   Medication Sig Dispense Refill    loratadine (Claritin) 10 mg tablet Take 10 mg by mouth.  simvastatin (ZOCOR) 20 mg tablet TAKE ONE TABLET BY MOUTH EVERY NIGHT 90 Tablet 1    doxycycline (MONODOX) 100 mg capsule TAKE 1 CAPSULE BY MOUTH 2 TIMES A  Capsule 1    levothyroxine (SYNTHROID) 125 mcg tablet TAKE 1 TABLET BY MOUTH ONE TIME A DAY BEFORE BREAKFAST 90 Tablet 1    clobetasoL (TEMOVATE) 0.05 % ointment Apply  to affected area two (2) times a day.  cyclobenzaprine (FLEXERIL) 10 mg tablet Take 1 Tab by mouth three (3) times daily as needed for Muscle Spasm(s). 15 Tab 0    cholecalciferol, vitamin D3, (VITAMIN D3 PO) Take  by mouth daily.  multivitamin (ONE A DAY) tablet Take 1 Tab by mouth daily. Indications: VITAMIN DEFICIENCY PREVENTION      ibuprofen (MOTRIN) 800 mg tablet Take 1 Tab by mouth every eight (8) hours as needed for Pain (with food).  (Patient not taking: Reported on 7/9/2021) 20 Tab 0       No Known Allergies    Past Medical History:   Diagnosis Date    GERD (gastroesophageal reflux disease)     Mitral valve prolapse     Other ill-defined conditions(799.89)     mitral valve prolapse    Thyroid disease     hypo       Past Surgical History:   Procedure Laterality Date    HX GYN      ov cyst    HX GYN      d & c's    HX HYSTERECTOMY      HX OTHER SURGICAL         Family History   Problem Relation Age of Onset    Thyroid Disease Mother     Heart Disease Father     Hypertension Father     Diabetes Father     Cataract Brother        Social History     Tobacco Use    Smoking status: Former Smoker     Years: 4.00     Types: Cigarettes    Smokeless tobacco: Never Used   Substance Use Topics    Alcohol use: Not Currently     Comment: occasionally       ROS   Review of Systems   Constitutional: Negative for chills and fever. HENT: Negative for ear pain and sore throat. Eyes: Negative for blurred vision and pain. Respiratory: Negative for cough and shortness of breath. Cardiovascular: Negative for chest pain. Gastrointestinal: Negative for abdominal pain, blood in stool and melena. Genitourinary: Negative for dysuria and hematuria. Musculoskeletal: Positive for joint pain. Negative for myalgias. Skin: Positive for rash. She fell down along her left leg last year and had a dark skin lesion along the area. It occasionally itches and sometimes is associated with pain when she touches the affected area. Neurological: Negative for tingling, focal weakness and headaches. Endo/Heme/Allergies: Does not bruise/bleed easily. Psychiatric/Behavioral: Negative for substance abuse. Objective     Vitals:    07/09/21 0926   BP: 111/78   Pulse: 80   Resp: 16   Temp: 97.7 °F (36.5 °C)   TempSrc: Temporal   SpO2: 99%   Weight: 149 lb (67.6 kg)   Height: 5' 5.5\" (1.664 m)   PainSc:   0 - No pain   LMP: 08/10/2013       Physical Exam  Vitals and nursing note reviewed. HENT:      Head: Normocephalic and atraumatic. Right Ear: External ear normal.      Left Ear: External ear normal.   Eyes:      General: No scleral icterus. Right eye: No discharge. Left eye: No discharge. Conjunctiva/sclera: Conjunctivae normal.   Cardiovascular:      Rate and Rhythm: Normal rate and regular rhythm. Heart sounds: Normal heart sounds. No murmur heard. No friction rub. No gallop. Pulmonary:      Effort: Pulmonary effort is normal. No respiratory distress. Breath sounds: Normal breath sounds. No wheezing or rales.    Abdominal:      General: Bowel sounds are normal. There is no distension. Palpations: Abdomen is soft. There is no mass. Tenderness: There is no abdominal tenderness. There is no guarding or rebound. Musculoskeletal:         General: No swelling (BUE) or tenderness (BUE and BLE). Cervical back: Neck supple. Comments: Her back exam is unremarkable. Lymphadenopathy:      Cervical: No cervical adenopathy. Skin:     General: Skin is warm and dry. Findings: No erythema. Comments: Small circular plaque along her left leg posteriorly, nontender to palpation and without erythema. She has a quarter sized round brown lesion that is flat along her left tibia area just inferior to her knee joint, without erythema and nontender to palpation. Neurological:      Mental Status: She is alert and oriented to person, place, and time. Motor: No abnormal muscle tone. Gait: Gait is intact.  Gait normal.   Psychiatric:         Mood and Affect: Mood and affect normal.         LABS   Data Review:   Lab Results   Component Value Date/Time    WBC 4.6 06/24/2021 09:00 AM    HGB 15.8 06/24/2021 09:00 AM    HCT 45.6 06/24/2021 09:00 AM    PLATELET 186 66/75/3286 09:00 AM     (H) 06/24/2021 09:00 AM       Lab Results   Component Value Date/Time    Sodium 144 07/02/2021 12:58 PM    Potassium 4.5 07/02/2021 12:58 PM    Chloride 106 07/02/2021 12:58 PM    CO2 27 07/02/2021 12:58 PM    Anion gap 5 01/16/2019 01:17 PM    Glucose 108 (H) 07/02/2021 12:58 PM    BUN 8 07/02/2021 12:58 PM    Creatinine 0.90 07/02/2021 12:58 PM    BUN/Creatinine ratio 9 07/02/2021 12:58 PM    GFR est AA 86 07/02/2021 12:58 PM    GFR est non-AA 75 07/02/2021 12:58 PM    Calcium 9.9 07/02/2021 12:58 PM       Lab Results   Component Value Date/Time    Cholesterol, total 202 (H) 06/24/2021 09:00 AM    HDL Cholesterol 50 06/24/2021 09:00 AM    LDL-CHOLESTEROL 100 (H) 07/01/2020 09:01 AM    LDL, calculated 123 (H) 06/24/2021 09:00 AM    VLDL, calculated 29 06/24/2021 09:00 AM    Triglyceride 166 (H) 06/24/2021 09:00 AM    Cholesterol/HDL ratio 3.2 07/01/2020 09:01 AM       Lab Results   Component Value Date/Time    Hemoglobin A1c 5.0 06/24/2021 09:00 AM       Assessment/Plan:   1. HLD: LDL is in the 120s. +H/o prediabetes   - C/w rx as prescribed. - Checking labs in 6 months and a year  Aggressive lifestyle modifications were discussed and recommended. 2. Hypothyroidism: Stable. - C/w rx as prescribed. - Checking labs in 1 yr.     3. Health Maintenance:  - Colon cancer screening recommended. Referral generated. - I encouraged her to get all recommended vaccinations. 4. Skin Lesions (along her LLE) and H/o Rosacea:  Referral to Dermatology generated. I will also include the diagnosis code, the need for a full body skin exam for skin cancer screening. Health Maintenance Due   Topic Date Due    COVID-19 Vaccine (1) Never done    Colorectal Cancer Screening Combo  Never done    Shingrix Vaccine Age 50> (1 of 2) Never done     Lab review: labs are reviewed in the EHR and ordered as mentioned above. I have discussed the diagnosis with the patient and the intended plan as seen in the above orders. The patient has received an after-visit summary and questions were answered concerning future plans. I have discussed medication side effects and warnings with the patient as well. I have reviewed the plan of care with the patient, accepted their input and they are in agreement with the treatment goals. All questions were answered. The patient understands the plan of care. Handouts provided today with above information. Pt instructed if symptoms worsen to call the office or report to the ED for continued care. Greater than 50% of the visit time was spent in counseling and/or coordination of care. Voice recognition was used to generate this report, which may have resulted in some phonetic based errors in grammar and contents.  Even though attempts were made to correct all the mistakes, some may have been missed, and remained in the body of the document.           Juany Osorio MD

## 2021-09-09 DIAGNOSIS — Z12.39 ENCOUNTER FOR SCREENING FOR MALIGNANT NEOPLASM OF BREAST, UNSPECIFIED SCREENING MODALITY: ICD-10-CM

## 2021-12-05 DIAGNOSIS — E03.9 ACQUIRED HYPOTHYROIDISM: ICD-10-CM

## 2021-12-05 DIAGNOSIS — E78.00 PURE HYPERCHOLESTEROLEMIA: ICD-10-CM

## 2021-12-05 RX ORDER — SIMVASTATIN 20 MG/1
TABLET, FILM COATED ORAL
Qty: 90 TABLET | Refills: 1 | Status: SHIPPED | OUTPATIENT
Start: 2021-12-05 | End: 2022-01-25

## 2021-12-05 RX ORDER — LEVOTHYROXINE SODIUM 125 UG/1
TABLET ORAL
Qty: 90 TABLET | Refills: 1 | Status: SHIPPED | OUTPATIENT
Start: 2021-12-05 | End: 2022-06-16

## 2021-12-23 ENCOUNTER — TELEPHONE (OUTPATIENT)
Dept: INTERNAL MEDICINE CLINIC | Age: 51
End: 2021-12-23

## 2021-12-23 ENCOUNTER — VIRTUAL VISIT (OUTPATIENT)
Dept: INTERNAL MEDICINE CLINIC | Age: 51
End: 2021-12-23
Attending: INTERNAL MEDICINE
Payer: COMMERCIAL

## 2021-12-23 ENCOUNTER — HOSPITAL ENCOUNTER (OUTPATIENT)
Dept: VASCULAR SURGERY | Age: 51
Discharge: HOME | End: 2021-12-23
Attending: INTERNAL MEDICINE
Payer: COMMERCIAL

## 2021-12-23 DIAGNOSIS — M79.604 RIGHT LEG PAIN: ICD-10-CM

## 2021-12-23 DIAGNOSIS — M79.604 RIGHT LEG PAIN: Primary | ICD-10-CM

## 2021-12-23 PROCEDURE — 93971 EXTREMITY STUDY: CPT

## 2021-12-23 PROCEDURE — 99214 OFFICE O/P EST MOD 30 MIN: CPT | Performed by: INTERNAL MEDICINE

## 2021-12-23 RX ORDER — CYCLOBENZAPRINE HCL 5 MG
5 TABLET ORAL
Qty: 45 TABLET | Refills: 5 | Status: SHIPPED | OUTPATIENT
Start: 2021-12-23 | End: 2022-09-02

## 2021-12-23 NOTE — PROGRESS NOTES
She Herrmann is a 49 y.o. female who was seen by synchronous (real-time) audio-video technology on 12/23/2021. Assessment & Plan:   Right leg pain  Ordering a PVL study of her right leg to rule out blood clot.  Okay to use Flexeril as needed for muscle spasm.  I encouraged her to stay hydrated by drinking water throughout the day.  She is to stop her Zocor for 1 week and to see if her symptoms resolve. ORDERS:  - DUPLEX LOWER EXT VENOUS RIGHT; Future  - cyclobenzaprine (FLEXERIL) 5 mg tablet; Take 1 Tablet by mouth three (3) times daily as needed for Muscle Spasm(s). Dispense: 45 Tablet; Refill: 5      Lab review: labs are reviewed in the EHR     I have discussed the diagnosis with the patient and the intended plan as seen in the above orders. I have discussed medication side effects and warnings with the patient as well. I have reviewed the plan of care with the patient, accepted their input and they are in agreement with the treatment goals. All questions were answered. The patient understands the plan of care. Pt instructed if symptoms worsen to call the office or report to the ED for continued care. Greater than 50% of the visit time was spent in counseling and/or coordination of care. Voice recognition was used to generate this report, which may have resulted in some phonetic based errors in grammar and contents. Even though attempts were made to correct all the mistakes, some may have been missed, and remained in the body of the document. Subjective:   She Herrmann was seen for   Chief Complaint   Patient presents with    Leg Pain     The pt is a 55yo female with h/o HTN, hypothyroidism, [place] CMO, mitral valve prolapse, rosecea, hidradenitis suppurativa, GERD, thyroid nodules, HLD, and acne vulgaris. Right Leg Pain:  She reports: \"It's not good. \" Her BP is 163/93. \"I'm having a thrill\" sensation in her right leg. Symptoms began in between appointments. It's pulsatile. \"When it started it was stronger and just in the right leg. \" She's drinking lots of coffee. She is drinking 1-2 cups per day. She reports persistent sx. 148/88 was another home BP reading. \"Maybe it's just stress. \" Sx come up from the calf to the thigh. Her right leg is tender per her hx. \"It wasn't tender when it started. She notes that she worked hard on her house, tearing up a concrete joselyn and I was squatting at the time of this activity just before sx onset. \"Both legs are tender. \"  She has not tried any rx for her sx. No SOB No edema in BLE. She is sedentary, working at home mostly. +5lbs weight gain since her lab. Her urine has not been dark. BP Readings from Last 3 Encounters:   07/09/21 111/78   01/01/21 (!) 183/88   07/30/19 127/83     Wt Readings from Last 3 Encounters:   07/09/21 149 lb (67.6 kg)   01/01/21 145 lb (65.8 kg)   07/30/19 146 lb 9.6 oz (66.5 kg)      **She lost another family member (cousin) to covid**      Prior to Admission medications    Medication Sig Start Date End Date Taking? Authorizing Provider   levothyroxine (SYNTHROID) 125 mcg tablet TAKE 1 TABLET BY MOUTH ONE TIME A DAY BEFORE BREAKFAST 12/5/21   Bolivar Patel MD   simvastatin (ZOCOR) 20 mg tablet TAKE ONE TABLET BY MOUTH NIGHTLY 12/5/21   Bolivar Patel MD   doxycycline (MONODOX) 100 mg capsule TAKE 1 CAPSULE BY MOUTH 2 TIMES A DAY 8/30/21   Bolivar Patel MD   loratadine (Claritin) 10 mg tablet Take 10 mg by mouth. Provider, Historical   clobetasoL (TEMOVATE) 0.05 % ointment Apply  to affected area two (2) times a day. Other, MD Alannah   cyclobenzaprine (FLEXERIL) 10 mg tablet Take 1 Tab by mouth three (3) times daily as needed for Muscle Spasm(s). 1/1/21   Landon Garay MD   ibuprofen (MOTRIN) 800 mg tablet Take 1 Tab by mouth every eight (8) hours as needed for Pain (with food).   Patient not taking: Reported on 7/9/2021 1/1/21   Landon Garay MD   cholecalciferol, vitamin D3, (VITAMIN D3 PO) Take  by mouth daily. Provider, Historical   multivitamin (ONE A DAY) tablet Take 1 Tab by mouth daily. Indications: VITAMIN DEFICIENCY PREVENTION    Provider, Historical     No Known Allergies  Past Medical History:   Diagnosis Date    GERD (gastroesophageal reflux disease)     Mitral valve prolapse     Other ill-defined conditions(799.89)     mitral valve prolapse    Thyroid disease     hypo     Past Surgical History:   Procedure Laterality Date    HX GYN      ov cyst    HX GYN      d & c's    HX HYSTERECTOMY      HX OTHER SURGICAL       Family History   Problem Relation Age of Onset    Thyroid Disease Mother     Heart Disease Father     Hypertension Father     Diabetes Father     Cataract Brother      Social History     Socioeconomic History    Marital status:    Tobacco Use    Smoking status: Former Smoker     Years: 4.00     Types: Cigarettes    Smokeless tobacco: Never Used   Substance and Sexual Activity    Alcohol use: Not Currently     Comment: occasionally    Drug use: No    Sexual activity: Yes     Partners: Male     Birth control/protection: None, Surgical       ROS:  Gen: No fever/chills  HEENT: No sore throat, eye pain, ear pain, or congestion.  No HA  CV: No CP  Resp: No cough/SOB  GI: No abdominal pain  : No hematuria/dysuria  Derm: No rash  Neuro: No new paresthesias/weakness  Musc: No new myalgias/jt pain  Psych: No depression sx      Objective:     General: alert, cooperative, no distress   Mental  status: mental status: alert, oriented to person, place, and time, normal mood, behavior, speech, dress, motor activity, and thought processes   Resp: resp: normal effort and no respiratory distress   Neuro: neuro: no gross deficits   Skin: skin: no discoloration or lesions of concern on visible areas     LABS:  Lab Results   Component Value Date/Time    Sodium 144 07/02/2021 12:58 PM    Potassium 4.5 07/02/2021 12:58 PM    Chloride 106 07/02/2021 12:58 PM CO2 27 07/02/2021 12:58 PM    Anion gap 5 01/16/2019 01:17 PM    Glucose 108 (H) 07/02/2021 12:58 PM    BUN 8 07/02/2021 12:58 PM    Creatinine 0.90 07/02/2021 12:58 PM    BUN/Creatinine ratio 9 07/02/2021 12:58 PM    GFR est AA 86 07/02/2021 12:58 PM    GFR est non-AA 75 07/02/2021 12:58 PM    Calcium 9.9 07/02/2021 12:58 PM       Lab Results   Component Value Date/Time    Cholesterol, total 202 (H) 06/24/2021 09:00 AM    HDL Cholesterol 50 06/24/2021 09:00 AM    LDL-CHOLESTEROL 100 (H) 07/01/2020 09:01 AM    LDL, calculated 123 (H) 06/24/2021 09:00 AM    VLDL, calculated 29 06/24/2021 09:00 AM    Triglyceride 166 (H) 06/24/2021 09:00 AM    Cholesterol/HDL ratio 3.2 07/01/2020 09:01 AM       Lab Results   Component Value Date/Time    WBC 4.6 06/24/2021 09:00 AM    HGB 15.8 06/24/2021 09:00 AM    HCT 45.6 06/24/2021 09:00 AM    PLATELET 991 04/10/6513 09:00 AM     (H) 06/24/2021 09:00 AM       Lab Results   Component Value Date/Time    Hemoglobin A1c 5.0 06/24/2021 09:00 AM       Lab Results   Component Value Date/Time    TSH 2.870 06/24/2021 09:00 AM           Due to this being a TeleHealth  evaluation, many elements of the physical examination are unable to be assessed. The pt was seen by synchronous (real-time) audio-video technology, and/or her healthcare decision maker, is aware that this patient-initiated, Telehealth encounter is a billable service, with coverage as determined by her insurance carrier. She is aware that she may receive a bill and has provided verbal consent to proceed: Yes. The pt is being evaluated by a video visit encounter for concerns as above. A caregiver was present when appropriate. Due to this being a TeleHealth encounter (During [facility] public health emergency), evaluation of the following organ systems was limited: Vitals/Constitutional/EENT/Resp/CV/GI//MS/Neuro/Skin/Heme-Lymph-Imm.    Pursuant to the emergency declaration under the [address] Emergencies Act, 1135 waiver authority and the Coronavirus Preparedness and Response Supplemental Appropriations Act, this Virtual  Visit was conducted, with patient's (and/or legal guardian's) consent, to reduce the patient's risk of exposure to COVID-19 and provide necessary medical care. Services were provided through a video synchronous discussion virtually to substitute for in-person clinic visit. Patient and provider were located at their individual home/office respectively. We discussed the expected course, resolution and complications of the diagnosis(es) in detail. Medication risks, benefits, costs, interactions, and alternatives were discussed as indicated. I advised her to contact the office if her condition worsens, changes or fails to improve as anticipated. She expressed understanding with the diagnosis(es) and plan.      Teretha Fleischer, MD

## 2021-12-23 NOTE — TELEPHONE ENCOUNTER
Pt is c/o pulsations in both of her legs x 2days. Started in one leg and then moved to both legs. No warmth nor swelling noted. Thought it may have been due to extra caffeine so she reduced caffeine intake but hasn't changed/reduced. Said she has been working in the house so thought it may be muscular but she is getting concerned since it isn't going away.   Please advise pt at [phone]

## 2021-12-27 NOTE — TELEPHONE ENCOUNTER
----- Message from Garcia Le MD sent at 12/26/2021 11:20 PM EST -----  Please let her know that her PVL study is negative for a DVT.     Dr. Syeda Miller  [facility], [address] [place] [address], [address] [place] [address].  Phone: [phone]  Fax: [phone]

## 2021-12-27 NOTE — PROGRESS NOTES
Please let her know that her PVL study is negative for a DVT.     Dr. Jozef Duckworth  [facility], [address] [facility], [address] [place] [address] Str.  Phone: [phone]  Fax: [phone]

## 2021-12-28 NOTE — TELEPHONE ENCOUNTER
Patient contacted, patient identified with two identifiers (Name & ). Patient aware of results per DR. Mcarthur and verbalizes understanding.

## 2022-01-07 ENCOUNTER — APPOINTMENT (OUTPATIENT)
Dept: INTERNAL MEDICINE CLINIC | Age: 52
End: 2022-01-07

## 2022-01-08 LAB
ALBUMIN SERPL-MCNC: 4.5 G/DL (ref 3.8–4.8)
ALBUMIN/GLOB SERPL: 2.3 {RATIO} (ref 1.2–2.2)
ALP SERPL-CCNC: 67 IU/L (ref 44–121)
ALT SERPL-CCNC: 62 IU/L (ref 0–32)
AST SERPL-CCNC: 43 IU/L (ref 0–40)
BILIRUB SERPL-MCNC: 0.4 MG/DL (ref 0–1.2)
BUN SERPL-MCNC: 7 MG/DL (ref 6–24)
BUN/CREAT SERPL: 10 (ref 9–23)
CALCIUM SERPL-MCNC: 9.3 MG/DL (ref 8.7–10.2)
CHLORIDE SERPL-SCNC: 104 MMOL/L (ref 96–106)
CHOLEST SERPL-MCNC: 249 MG/DL (ref 100–199)
CO2 SERPL-SCNC: 24 MMOL/L (ref 20–29)
CREAT SERPL-MCNC: 0.7 MG/DL (ref 0.57–1)
EST. AVERAGE GLUCOSE BLD GHB EST-MCNC: 100 MG/DL
GLOBULIN SER CALC-MCNC: 2 G/DL (ref 1.5–4.5)
GLUCOSE SERPL-MCNC: 86 MG/DL (ref 65–99)
HBA1C MFR BLD: 5.1 % (ref 4.8–5.6)
HDLC SERPL-MCNC: 55 MG/DL
IMP & REVIEW OF LAB RESULTS: NORMAL
LDLC SERPL CALC-MCNC: 169 MG/DL (ref 0–99)
POTASSIUM SERPL-SCNC: 4.2 MMOL/L (ref 3.5–5.2)
PROT SERPL-MCNC: 6.5 G/DL (ref 6–8.5)
SODIUM SERPL-SCNC: 142 MMOL/L (ref 134–144)
T4 FREE SERPL-MCNC: 0.91 NG/DL (ref 0.82–1.77)
TRIGL SERPL-MCNC: 137 MG/DL (ref 0–149)
TSH SERPL DL<=0.005 MIU/L-ACNC: 22.7 UIU/ML (ref 0.45–4.5)
VLDLC SERPL CALC-MCNC: 25 MG/DL (ref 5–40)

## 2022-01-11 NOTE — PROGRESS NOTES
Please let her know that her labs show that her TSH is very high at 22.7. Free T4 is low normal at 0.91. Her AST and ALT were mildly elevated at 43 and 62 respectively. Her total cholesterol was 249. Triglycerides are 137. HDL is 55. LDL is up to 169 from 123. Her A1c is 5.1 and normal.  Please schedule her for a virtual visit with me tomorrow to discuss.     Dr. Christophe Hernandez  [facility] [address], [address] [place] [address] Str.  Phone: [phone]  Fax: [phone]

## 2022-01-12 ENCOUNTER — VIRTUAL VISIT (OUTPATIENT)
Dept: INTERNAL MEDICINE CLINIC | Age: 52
End: 2022-01-12
Payer: COMMERCIAL

## 2022-01-12 DIAGNOSIS — M79.605 PAIN IN BOTH LOWER EXTREMITIES: ICD-10-CM

## 2022-01-12 DIAGNOSIS — M54.6 THORACIC BACK PAIN, UNSPECIFIED BACK PAIN LATERALITY, UNSPECIFIED CHRONICITY: ICD-10-CM

## 2022-01-12 DIAGNOSIS — E03.9 ACQUIRED HYPOTHYROIDISM: ICD-10-CM

## 2022-01-12 DIAGNOSIS — R74.01 TRANSAMINITIS: ICD-10-CM

## 2022-01-12 DIAGNOSIS — K76.0 NAFLD (NONALCOHOLIC FATTY LIVER DISEASE): ICD-10-CM

## 2022-01-12 DIAGNOSIS — M79.604 PAIN IN BOTH LOWER EXTREMITIES: ICD-10-CM

## 2022-01-12 DIAGNOSIS — E78.5 HYPERLIPIDEMIA, UNSPECIFIED HYPERLIPIDEMIA TYPE: Primary | ICD-10-CM

## 2022-01-12 PROCEDURE — 99214 OFFICE O/P EST MOD 30 MIN: CPT | Performed by: INTERNAL MEDICINE

## 2022-01-12 RX ORDER — PRAVASTATIN SODIUM 40 MG/1
40 TABLET ORAL
Qty: 90 TABLET | Refills: 3 | Status: SHIPPED | OUTPATIENT
Start: 2022-01-12 | End: 2022-04-27

## 2022-01-12 NOTE — PROGRESS NOTES
Kaur Lynch is a 49 y.o. female who was seen by synchronous (real-time) audio-video technology on 1/12/2022. Assessment & Plan:   1. Hyperlipidemia: LDL is up to 169 from 123 w/o statin rx. Zocor added as a medication intolerance. Trial of pravastatin. ORDERS:  - pravastatin (PRAVACHOL) 40 mg tablet; Take 1 Tablet by mouth nightly. Dispense: 90 Tablet; Refill: 3  - LIPID PANEL; Future    2. Thoracic back pain: Described as pain in between her shoulder blades. Thoracic radiculopathy? Ordering a thoracic spine x-ray. ORDERS:  - XR SPINE THORAC 2 V; Future    3. Transaminitis: Likely from statin use (she's been off of a statin though) but she has known NAFLD. We will check a follow-up CMP in April. ORDERS:  - METABOLIC PANEL, COMPREHENSIVE; Future  - CBC WITH AUTOMATED DIFF; Future  - LIPID PANEL; Future    4 Acquired hypothyroidism: TSH is very high. Free T4 is low normal.  I encouraged her to restart her Synthroid. We will recheck her TFTs in April. Adding a thyroid antibody panel. ORDERS:  - TSH AND FREE T4; Future    5. BLE Pain: Etiology is unknown but symptoms resolved. We will proceed with observation. Lab review: labs are reviewed in the EHR     I have discussed the diagnosis with the patient and the intended plan as seen in the above orders. I have discussed medication side effects and warnings with the patient as well. I have reviewed the plan of care with the patient, accepted their input and they are in agreement with the treatment goals. All questions were answered. The patient understands the plan of care. Pt instructed if symptoms worsen to call the office or report to the ED for continued care. Greater than 50% of the visit time was spent in counseling and/or coordination of care. Voice recognition was used to generate this report, which may have resulted in some phonetic based errors in grammar and contents.  Even though attempts were made to correct all the mistakes, some may have been missed, and remained in the body of the document. Subjective:   Karen Ferguson was seen for   Chief Complaint   Patient presents with    Leg Pain     The pt is a 48yo female with h/o HTN, hypothyroidism, [place] CMO, mitral valve prolapse, rosecea, hidradenitis suppurativa, GERD, NAFLD, thyroid nodules, HLD, and acne vulgaris. 1. BLE Pain: Reported at her last appointment, mostly along her right leg. A PVL study was ordered to rule out DVT. She was also given a course of Flexeril to try for muscle spasms. She was told to stop her Zocor for 1 week and to see if her symptoms resolve. Symptoms were associated with elevated blood pressures. Pain quality was noted to be a thrill-like sensation. Since her last appointment, her PVL study ruled out DVT. Overall, she states that her right leg pain has improved. She stopped having symptoms this past Friday. She stopped taking Zocor on Christmas. She also reports tingling in between her shoulder blades. She had similar symptoms years ago, experiencing this sensation, when her blood pressure was elevated. Her blood pressure recently is in the 120s over 80s. The pain in between her shoulder blades resolved with stopping Zocor. She also reports that she had similar symptoms in the past, when she had GERD and was placed on Dexilant. 2. HLD: Her most recent labs show that her LDL is 169, up from 123 in June 2021. Note that she stopped her Zocor per my instructions at her last appointment. 3. Transamimitis: Her most recent liver test showed that her AST and ALT are elevated at 43 and 62 respectively. They were 35 and 46 in July of last year. She has a history of hepatic steatosis, seen on an ultrasound of her liver from July 2020. 4.  Hypothyroidism: There is some confusion about what medications she should be taking. She accidentally stopped taking her Synthroid around the time of Christmas. Her most recent labs show that her TSH is very high at 22.7. Her free T4 is low normal at 0.91. Prior to Admission medications    Medication Sig Start Date End Date Taking? Authorizing Provider   pravastatin (PRAVACHOL) 40 mg tablet Take 1 Tablet by mouth nightly. 1/12/22  Yes Radha Pelayo MD   cyclobenzaprine (FLEXERIL) 5 mg tablet Take 1 Tablet by mouth three (3) times daily as needed for Muscle Spasm(s). 12/23/21   Radha Pelayo MD   levothyroxine (SYNTHROID) 125 mcg tablet TAKE 1 TABLET BY MOUTH ONE TIME A DAY BEFORE BREAKFAST 12/5/21   Radha Pelayo MD   simvastatin (ZOCOR) 20 mg tablet TAKE ONE TABLET BY MOUTH NIGHTLY 12/5/21   Radha Pelayo MD   doxycycline (MONODOX) 100 mg capsule TAKE 1 CAPSULE BY MOUTH 2 TIMES A DAY 8/30/21   Radha Pelayo MD   loratadine (Claritin) 10 mg tablet Take 10 mg by mouth. Provider, Historical   clobetasoL (TEMOVATE) 0.05 % ointment Apply  to affected area two (2) times a day. Other, MD Alannah   ibuprofen (MOTRIN) 800 mg tablet Take 1 Tab by mouth every eight (8) hours as needed for Pain (with food). Patient not taking: Reported on 7/9/2021 1/1/21   Kalen Cottrell MD   cholecalciferol, vitamin D3, (VITAMIN D3 PO) Take  by mouth daily. Provider, Historical   multivitamin (ONE A DAY) tablet Take 1 Tab by mouth daily.  Indications: VITAMIN DEFICIENCY PREVENTION    Provider, Historical     No Known Allergies  Past Medical History:   Diagnosis Date    GERD (gastroesophageal reflux disease)     Mitral valve prolapse     Other ill-defined conditions(799.89)     mitral valve prolapse    Thyroid disease     hypo     Past Surgical History:   Procedure Laterality Date    HX GYN      ov cyst    HX GYN      d & c's    HX HYSTERECTOMY      HX OTHER SURGICAL       Family History   Problem Relation Age of Onset    Thyroid Disease Mother     Heart Disease Father     Hypertension Father     Diabetes Father     Cataract Brother      Social History Socioeconomic History    Marital status:    Tobacco Use    Smoking status: Former Smoker     Years: 4.00     Types: Cigarettes    Smokeless tobacco: Never Used   Substance and Sexual Activity    Alcohol use: Not Currently     Comment: occasionally    Drug use: No    Sexual activity: Yes     Partners: Male     Birth control/protection: None, Surgical       ROS:  Gen: No fever/chills  HEENT: No sore throat, eye pain, ear pain, or congestion.  No HA  CV: No CP  Resp: No cough/SOB  GI: No abdominal pain  : No hematuria/dysuria  Derm: No rash  Neuro: No new paresthesias/weakness  Musc: No new myalgias/jt pain  Psych: No depression sx      Objective:     General: alert, cooperative, no distress   Mental  status: mental status: alert, oriented to person, place, and time, normal mood, behavior, speech, dress, motor activity, and thought processes   Resp: resp: normal effort and no respiratory distress   Neuro: neuro: no gross deficits   Skin: skin: no discoloration or lesions of concern on visible areas     LABS:  Lab Results   Component Value Date/Time    Sodium 142 01/07/2022 12:00 AM    Potassium 4.2 01/07/2022 12:00 AM    Chloride 104 01/07/2022 12:00 AM    CO2 24 01/07/2022 12:00 AM    Anion gap 5 01/16/2019 01:17 PM    Glucose 86 01/07/2022 12:00 AM    BUN 7 01/07/2022 12:00 AM    Creatinine 0.70 01/07/2022 12:00 AM    BUN/Creatinine ratio 10 01/07/2022 12:00 AM    GFR est  01/07/2022 12:00 AM    GFR est non- 01/07/2022 12:00 AM    Calcium 9.3 01/07/2022 12:00 AM       Lab Results   Component Value Date/Time    Cholesterol, total 249 (H) 01/07/2022 12:00 AM    HDL Cholesterol 55 01/07/2022 12:00 AM    LDL-CHOLESTEROL 100 (H) 07/01/2020 09:01 AM    LDL, calculated 169 (H) 01/07/2022 12:00 AM    VLDL, calculated 25 01/07/2022 12:00 AM    Triglyceride 137 01/07/2022 12:00 AM    Cholesterol/HDL ratio 3.2 07/01/2020 09:01 AM       Lab Results   Component Value Date/Time    WBC 4.6 06/24/2021 09:00 AM    HGB 15.8 06/24/2021 09:00 AM    HCT 45.6 06/24/2021 09:00 AM    PLATELET 652 48/36/3103 09:00 AM     (H) 06/24/2021 09:00 AM       Lab Results   Component Value Date/Time    Hemoglobin A1c 5.1 01/07/2022 12:00 AM       Lab Results   Component Value Date/Time    TSH 22.700 (H) 01/07/2022 12:00 AM           Due to this being a TeleHealth  evaluation, many elements of the physical examination are unable to be assessed. The pt was seen by synchronous (real-time) audio-video technology, and/or her healthcare decision maker, is aware that this patient-initiated, Telehealth encounter is a billable service, with coverage as determined by her insurance carrier. She is aware that she may receive a bill and has provided verbal consent to proceed: Yes. The pt is being evaluated by a video visit encounter for concerns as above. A caregiver was present when appropriate. Due to this being a TeleHealth encounter (During [facility]-40 public health emergency), evaluation of the following organ systems was limited: Vitals/Constitutional/EENT/Resp/CV/GI//MS/Neuro/Skin/Heme-Lymph-Imm. Pursuant to the emergency declaration under the [facility] waiver authority and the [employer] Act, this Virtual  Visit was conducted, with patient's (and/or legal guardian's) consent, to reduce the patient's risk of exposure to COVID-19 and provide necessary medical care. Services were provided through a video synchronous discussion virtually to substitute for in-person clinic visit. Patient and provider were located at their individual home/office respectively. We discussed the expected course, resolution and complications of the diagnosis(es) in detail. Medication risks, benefits, costs, interactions, and alternatives were discussed as indicated.   I advised her to contact the office if her condition worsens, changes or fails to improve as anticipated. She expressed understanding with the diagnosis(es) and plan.      Farzaneh Silva MD

## 2022-01-21 ENCOUNTER — PATIENT MESSAGE (OUTPATIENT)
Dept: INTERNAL MEDICINE CLINIC | Age: 52
End: 2022-01-21

## 2022-01-21 NOTE — TELEPHONE ENCOUNTER
----- Message from Frederic Coles. Mariajose sent at 1/21/2022  3:39 PM EST -----  Regarding: legs  Hello,  I wanted to report to you that last night my legs started that tremor feeling. Not sure how to describe in words but same as we discussed just not as bad. I just received the other statin and havent started it. I will start tonight.   thanks,  Ronnie Seymour

## 2022-01-25 DIAGNOSIS — R20.2 LEG PARESTHESIA: Primary | ICD-10-CM

## 2022-01-26 ENCOUNTER — PATIENT MESSAGE (OUTPATIENT)
Dept: NEUROLOGY | Age: 52
End: 2022-01-26

## 2022-03-18 PROBLEM — K75.81 NASH (NONALCOHOLIC STEATOHEPATITIS): Status: ACTIVE | Noted: 2020-07-10

## 2022-03-18 PROBLEM — L70.0 ACNE VULGARIS: Status: ACTIVE | Noted: 2018-02-28

## 2022-03-18 PROBLEM — E03.9 ACQUIRED HYPOTHYROIDISM: Status: ACTIVE | Noted: 2018-02-28

## 2022-03-19 PROBLEM — R73.01 IFG (IMPAIRED FASTING GLUCOSE): Status: ACTIVE | Noted: 2018-02-28

## 2022-03-19 PROBLEM — E55.9 VITAMIN D DEFICIENCY: Status: ACTIVE | Noted: 2018-04-11

## 2022-03-19 PROBLEM — I34.1 MITRAL VALVE PROLAPSE: Status: ACTIVE | Noted: 2018-02-28

## 2022-03-19 PROBLEM — E78.5 HYPERLIPIDEMIA: Status: ACTIVE | Noted: 2018-02-28

## 2022-03-19 PROBLEM — I10 ESSENTIAL HYPERTENSION: Status: ACTIVE | Noted: 2018-02-28

## 2022-03-19 PROBLEM — K21.9 GERD (GASTROESOPHAGEAL REFLUX DISEASE): Status: ACTIVE | Noted: 2018-02-28

## 2022-03-19 PROBLEM — L71.9 ROSACEA: Status: ACTIVE | Noted: 2019-06-24

## 2022-04-27 DIAGNOSIS — E78.00 PURE HYPERCHOLESTEROLEMIA: ICD-10-CM

## 2022-04-27 RX ORDER — SIMVASTATIN 20 MG/1
20 TABLET, FILM COATED ORAL
Qty: 90 TABLET | Refills: 1 | Status: SHIPPED | OUTPATIENT
Start: 2022-04-27 | End: 2022-10-10 | Stop reason: SDUPTHER

## 2022-06-16 DIAGNOSIS — E03.9 ACQUIRED HYPOTHYROIDISM: ICD-10-CM

## 2022-06-16 RX ORDER — LEVOTHYROXINE SODIUM 125 UG/1
TABLET ORAL
Qty: 90 TABLET | Refills: 1 | Status: SHIPPED | OUTPATIENT
Start: 2022-06-16

## 2022-06-16 RX ORDER — LEVOTHYROXINE SODIUM 125 UG/1
TABLET ORAL
Qty: 90 TABLET | Refills: 1 | OUTPATIENT
Start: 2022-06-16

## 2022-06-16 NOTE — TELEPHONE ENCOUNTER
Last Visit: 1/12/22 with MD Tracy Ramos  Next Appointment: 7/15/22 with MD Tracy Ramos  Previous Refill Encounter(s): 12/5/21 #90 with 1 refill    Requested Prescriptions     Pending Prescriptions Disp Refills    levothyroxine (SYNTHROID) 125 mcg tablet [Pharmacy Med Name: LEVOTHYROXINE SODIUM 125MCG TABS] 90 Tablet 1     Sig: TAKE 1 TABLET BY MOUTH ONE TIME A DAY BEFORE BREAKFAST         For Pharmacy Admin Tracking Only     CPA in place:    Recommendation Provided To:    Intervention Detail: New Rx: 1, reason: Patient Preference   Gap Closed?:    Intervention Accepted By:   Vikram An Time Spent (min): 5

## 2022-06-16 NOTE — TELEPHONE ENCOUNTER
Duplicate        For Pharmacy Admin Tracking Only     CPA in place:    Recommendation Provided To:    Intervention Detail: Discontinued Rx: 1, reason: Duplicate Therapy   Gap Closed?:    Intervention Accepted By:   Vivek Time Spent (min): 5

## 2022-07-08 ENCOUNTER — APPOINTMENT (OUTPATIENT)
Dept: INTERNAL MEDICINE CLINIC | Age: 52
End: 2022-07-08

## 2022-07-08 DIAGNOSIS — R73.01 IFG (IMPAIRED FASTING GLUCOSE): ICD-10-CM

## 2022-07-08 DIAGNOSIS — E78.5 HYPERLIPIDEMIA, UNSPECIFIED HYPERLIPIDEMIA TYPE: ICD-10-CM

## 2022-07-08 DIAGNOSIS — E03.9 ACQUIRED HYPOTHYROIDISM: ICD-10-CM

## 2022-07-08 DIAGNOSIS — K75.81 NASH (NONALCOHOLIC STEATOHEPATITIS): ICD-10-CM

## 2022-07-09 LAB
ALBUMIN SERPL-MCNC: 4.5 G/DL (ref 3.8–4.9)
ALBUMIN/GLOB SERPL: 2.1 {RATIO} (ref 1.2–2.2)
ALP SERPL-CCNC: 68 IU/L (ref 44–121)
ALT SERPL-CCNC: 69 IU/L (ref 0–32)
AST SERPL-CCNC: 57 IU/L (ref 0–40)
BASOPHILS # BLD AUTO: 0 X10E3/UL (ref 0–0.2)
BASOPHILS NFR BLD AUTO: 1 %
BILIRUB SERPL-MCNC: 0.5 MG/DL (ref 0–1.2)
BUN SERPL-MCNC: 5 MG/DL (ref 6–24)
BUN/CREAT SERPL: 10 (ref 9–23)
CALCIUM SERPL-MCNC: 9.3 MG/DL (ref 8.7–10.2)
CHLORIDE SERPL-SCNC: 101 MMOL/L (ref 96–106)
CHOLEST SERPL-MCNC: 171 MG/DL (ref 100–199)
CO2 SERPL-SCNC: 23 MMOL/L (ref 20–29)
CREAT SERPL-MCNC: 0.52 MG/DL (ref 0.57–1)
EGFR: 112 ML/MIN/1.73
EOSINOPHIL # BLD AUTO: 0.1 X10E3/UL (ref 0–0.4)
EOSINOPHIL NFR BLD AUTO: 2 %
ERYTHROCYTE [DISTWIDTH] IN BLOOD BY AUTOMATED COUNT: 11.5 % (ref 11.7–15.4)
EST. AVERAGE GLUCOSE BLD GHB EST-MCNC: 97 MG/DL
GLOBULIN SER CALC-MCNC: 2.1 G/DL (ref 1.5–4.5)
GLUCOSE SERPL-MCNC: 80 MG/DL (ref 65–99)
HBA1C MFR BLD: 5 % (ref 4.8–5.6)
HCT VFR BLD AUTO: 44.7 % (ref 34–46.6)
HDLC SERPL-MCNC: 49 MG/DL
HGB BLD-MCNC: 15 G/DL (ref 11.1–15.9)
IMM GRANULOCYTES # BLD AUTO: 0 X10E3/UL (ref 0–0.1)
IMM GRANULOCYTES NFR BLD AUTO: 0 %
IMP & REVIEW OF LAB RESULTS: NORMAL
LDLC SERPL CALC-MCNC: 100 MG/DL (ref 0–99)
LYMPHOCYTES # BLD AUTO: 1.7 X10E3/UL (ref 0.7–3.1)
LYMPHOCYTES NFR BLD AUTO: 41 %
MCH RBC QN AUTO: 34 PG (ref 26.6–33)
MCHC RBC AUTO-ENTMCNC: 33.6 G/DL (ref 31.5–35.7)
MCV RBC AUTO: 101 FL (ref 79–97)
MONOCYTES # BLD AUTO: 0.5 X10E3/UL (ref 0.1–0.9)
MONOCYTES NFR BLD AUTO: 12 %
NEUTROPHILS # BLD AUTO: 1.8 X10E3/UL (ref 1.4–7)
NEUTROPHILS NFR BLD AUTO: 44 %
PLATELET # BLD AUTO: 243 X10E3/UL (ref 150–450)
POTASSIUM SERPL-SCNC: 4.2 MMOL/L (ref 3.5–5.2)
PROT SERPL-MCNC: 6.6 G/DL (ref 6–8.5)
RBC # BLD AUTO: 4.41 X10E6/UL (ref 3.77–5.28)
SODIUM SERPL-SCNC: 140 MMOL/L (ref 134–144)
T4 FREE SERPL-MCNC: 1.6 NG/DL (ref 0.82–1.77)
TRIGL SERPL-MCNC: 125 MG/DL (ref 0–149)
TSH SERPL DL<=0.005 MIU/L-ACNC: 1.92 UIU/ML (ref 0.45–4.5)
VLDLC SERPL CALC-MCNC: 22 MG/DL (ref 5–40)
WBC # BLD AUTO: 4.1 X10E3/UL (ref 3.4–10.8)

## 2022-07-15 ENCOUNTER — OFFICE VISIT (OUTPATIENT)
Dept: INTERNAL MEDICINE CLINIC | Age: 52
End: 2022-07-15
Payer: COMMERCIAL

## 2022-07-15 VITALS
HEART RATE: 72 BPM | SYSTOLIC BLOOD PRESSURE: 133 MMHG | RESPIRATION RATE: 16 BRPM | DIASTOLIC BLOOD PRESSURE: 71 MMHG | WEIGHT: 152 LBS | HEIGHT: 66 IN | BODY MASS INDEX: 24.43 KG/M2 | TEMPERATURE: 97.8 F | OXYGEN SATURATION: 100 %

## 2022-07-15 DIAGNOSIS — Z00.00 ROUTINE GENERAL MEDICAL EXAMINATION AT A HEALTH CARE FACILITY: Primary | ICD-10-CM

## 2022-07-15 DIAGNOSIS — E03.9 ACQUIRED HYPOTHYROIDISM: ICD-10-CM

## 2022-07-15 DIAGNOSIS — I10 ESSENTIAL HYPERTENSION: ICD-10-CM

## 2022-07-15 DIAGNOSIS — K76.0 NAFLD (NONALCOHOLIC FATTY LIVER DISEASE): ICD-10-CM

## 2022-07-15 DIAGNOSIS — E78.5 HYPERLIPIDEMIA, UNSPECIFIED HYPERLIPIDEMIA TYPE: ICD-10-CM

## 2022-07-15 PROCEDURE — 99396 PREV VISIT EST AGE 40-64: CPT | Performed by: INTERNAL MEDICINE

## 2022-07-15 NOTE — PROGRESS NOTES
Ovi Veliz presents today for   Chief Complaint   Patient presents with    Follow-up    Labs     7-8-22    Complete Physical     [facility] SCREENING/form completion         1. \"Have you been to the ER, urgent care clinic since your last visit? Hospitalized since your last visit? \" no    2. \"Have you seen or consulted any other health care providers outside of the [address] [place] [address] since your last visit? \" yes     3. For patients aged 39-70: Has the patient had a colonoscopy / FIT/ Cologuard? No      If the patient is female:    4. For patients aged 41-77: Has the patient had a mammogram within the past 2 years? Yes - no Care Gap present  See top three    5. For patients aged 21-65: Has the patient had a pap smear?  Yes - no Care Gap present

## 2022-07-15 NOTE — PROGRESS NOTES
INTERNISTS OF [facility]:  7/15/2022, MRN: [id number]      Osvaldo Nguyễn is a 47 y.o. female and presents to clinic for Follow-up, Labs (7-8-22), and Complete Physical ([facility] HEALTH SCREENING/form completion)      Subjective: The pt is a 55yo female with h/o HTN, hypothyroidism, [place] CMO, mitral valve prolapse, rosecea, hidradenitis suppurativa, GERD, NAFLD, thyroid nodules, HLD, and acne vulgaris. 1. HLD/NAFLD: She is taking zocor. She had myalgias w/i the past year from sitting in a new office chair. Her sx were unrelated to her statin. She thus restarted her statin and noted no difference in her sx. Her sx resolved with changing her office work chair - that she sits in. +NAFLD. Her ALT and AST are 69 and 57. Her LDL this month is 100. Her A1C is WNL. She is followed by GI. +H/o a mildly elevated BG noted on a previous lab. 2. Hypothyroidism: Taking Synthroid 125mcg daily. Her TFTs are WNL per her recent labs. Her TSH was previously very high. 3. General: She is in the process of getting scheduled to see Dermatology but is concerned about being draped well during her exam      Patient Active Problem List    Diagnosis Date Noted    SOLORIO (nonalcoholic steatohepatitis) 07/10/2020    Rosacea 06/24/2019    Vitamin D deficiency 04/11/2018    Acquired hypothyroidism 02/28/2018    Essential hypertension 02/28/2018    GERD (gastroesophageal reflux disease) 02/28/2018    Hyperlipidemia 02/28/2018    Acne vulgaris 02/28/2018    Mitral valve prolapse 02/28/2018    IFG (impaired fasting glucose) 02/28/2018       Current Outpatient Medications   Medication Sig Dispense Refill    levothyroxine (SYNTHROID) 125 mcg tablet TAKE 1 TABLET BY MOUTH ONE TIME A DAY BEFORE BREAKFAST 90 Tablet 1    simvastatin (ZOCOR) 20 mg tablet Take 1 Tablet by mouth nightly. 90 Tablet 1    cyclobenzaprine (FLEXERIL) 5 mg tablet Take 1 Tablet by mouth three (3) times daily as needed for Muscle Spasm(s).  45 Tablet 5 doxycycline (MONODOX) 100 mg capsule TAKE 1 CAPSULE BY MOUTH 2 TIMES A  Capsule 2    loratadine (Claritin) 10 mg tablet Take 10 mg by mouth. cholecalciferol, vitamin D3, (VITAMIN D3 PO) Take  by mouth daily. multivitamin (ONE A DAY) tablet Take 1 Tab by mouth daily. Indications: VITAMIN DEFICIENCY PREVENTION      clobetasoL (TEMOVATE) 0.05 % ointment Apply  to affected area two (2) times a day. ibuprofen (MOTRIN) 800 mg tablet Take 1 Tab by mouth every eight (8) hours as needed for Pain (with food). (Patient not taking: Reported on 7/9/2021) 20 Tab 0       Allergies   Allergen Reactions    Zocor [Simvastatin] Myalgia and Other (comments)     Myalgias and paresthesias       Past Medical History:   Diagnosis Date    Contact dermatitis and eczema due to cause 1996    Hydradenitis    GERD (gastroesophageal reflux disease)     Hypercholesterolemia 2016    Hypertension 10/2017    Mitral valve prolapse     Other ill-defined conditions(799.89)     mitral valve prolapse    Thyroid disease     hypo       Past Surgical History:   Procedure Laterality Date    HX GYN      ov cyst    HX GYN      d & c's    HX HYSTERECTOMY      HX OTHER SURGICAL         Family History   Problem Relation Age of Onset    Thyroid Disease Mother     Elevated Lipids Mother     Hypertension Mother     Lung Disease Mother         COPD    Heart Disease Father     Hypertension Father     Diabetes Father     Cancer Father         Skin    Elevated Lipids Father     Cataract Brother     Elevated Lipids Sister        Social History     Tobacco Use    Smoking status: Former Smoker     Years: 4.00     Types: Cigarettes    Smokeless tobacco: Never Used   Substance Use Topics    Alcohol use: Not Currently     Comment: occasionally       ROS   Review of Systems   Constitutional:  Negative for chills and fever. HENT:  Negative for ear pain and sore throat. Eyes:  Negative for blurred vision and pain.    Respiratory:  Negative for cough and shortness of breath. Cardiovascular:  Negative for chest pain. Gastrointestinal:  Negative for abdominal pain, blood in stool and melena. Genitourinary:  Negative for dysuria and hematuria. Musculoskeletal:  Negative for joint pain and myalgias. Skin:  Negative for rash. Neurological:  Negative for headaches. Endo/Heme/Allergies:  Does not bruise/bleed easily. Psychiatric/Behavioral:  Negative for substance abuse. Objective     Vitals:    07/15/22 1006   BP: 133/71   Pulse: 72   Resp: 16   Temp: 97.8 °F (36.6 °C)   TempSrc: Temporal   SpO2: 100%   Weight: 152 lb (68.9 kg)   Height: 5' 5.5\" (1.664 m)   PainSc:   0 - No pain   LMP: 08/10/2013       Physical Exam  Vitals and nursing note reviewed. HENT:      Head: Normocephalic and atraumatic. Right Ear: External ear normal.      Left Ear: External ear normal.   Eyes:      General: No scleral icterus. Right eye: No discharge. Left eye: No discharge. Conjunctiva/sclera: Conjunctivae normal.   Cardiovascular:      Rate and Rhythm: Normal rate and regular rhythm. Heart sounds: Normal heart sounds. No murmur heard. No friction rub. No gallop. Pulmonary:      Effort: Pulmonary effort is normal. No respiratory distress. Breath sounds: Normal breath sounds. No wheezing or rales. Chest:      Chest wall: No tenderness. Abdominal:      General: Bowel sounds are normal. There is no distension. Palpations: Abdomen is soft. There is no mass. Tenderness: There is no abdominal tenderness. There is no guarding or rebound. Musculoskeletal:         General: No swelling (BUE) or tenderness (BUE/BLE). Cervical back: Neck supple. Comments: Her back exam is unremarkable. Lymphadenopathy:      Cervical: No cervical adenopathy. Skin:     General: Skin is warm and dry. Findings: No erythema or rash. Neurological:      Mental Status: She is alert. Mental status is at baseline.       Motor: No abnormal muscle tone. Gait: Gait normal.   Psychiatric:         Mood and Affect: Mood normal.       LABS   Data Review:   Lab Results   Component Value Date/Time    WBC 4.1 07/08/2022 09:26 AM    HGB 15.0 07/08/2022 09:26 AM    HCT 44.7 07/08/2022 09:26 AM    PLATELET 693 31/25/1891 09:26 AM     (H) 07/08/2022 09:26 AM       Lab Results   Component Value Date/Time    Sodium 140 07/08/2022 09:26 AM    Potassium 4.2 07/08/2022 09:26 AM    Chloride 101 07/08/2022 09:26 AM    CO2 23 07/08/2022 09:26 AM    Anion gap 5 01/16/2019 01:17 PM    Glucose 80 07/08/2022 09:26 AM    BUN 5 (L) 07/08/2022 09:26 AM    Creatinine 0.52 (L) 07/08/2022 09:26 AM    BUN/Creatinine ratio 10 07/08/2022 09:26 AM    GFR est  01/07/2022 12:00 AM    GFR est non- 01/07/2022 12:00 AM    Calcium 9.3 07/08/2022 09:26 AM       Lab Results   Component Value Date/Time    Cholesterol, total 171 07/08/2022 09:26 AM    HDL Cholesterol 49 07/08/2022 09:26 AM    LDL-CHOLESTEROL 100 (H) 07/01/2020 09:01 AM    LDL, calculated 100 (H) 07/08/2022 09:26 AM    VLDL, calculated 22 07/08/2022 09:26 AM    Triglyceride 125 07/08/2022 09:26 AM    Cholesterol/HDL ratio 3.2 07/01/2020 09:01 AM       Lab Results   Component Value Date/Time    Hemoglobin A1c 5.0 07/08/2022 09:26 AM       Assessment/Plan:   1. Hypothyroidism: Stable. - C/w rx as prescribed. - Checking labs in a year    2. HLD/NAFLD: LDL is 100. No prediabetes. +Transaminitis. - Avoidance of ETOH  - I encouraged her to follow up with GI for a check up  - Checking labs in a year        Health Maintenance Due   Topic Date Due    COVID-19 Vaccine (1) Never done    Colorectal Cancer Screening Combo  Never done    Shingrix Vaccine Age 50> (1 of 2) Never done     Lab review: labs are reviewed in the EHR and ordered as mentioned above    I have discussed the diagnosis with the patient and the intended plan as seen in the above orders.   The patient has received an after-visit summary and questions were answered concerning future plans. I have discussed medication side effects and warnings with the patient as well. I have reviewed the plan of care with the patient, accepted their input and they are in agreement with the treatment goals. All questions were answered. The patient understands the plan of care. Handouts provided today with above information. Pt instructed if symptoms worsen to call the office or report to the ED for continued care. Greater than 50% of the visit time was spent in counseling and/or coordination of care. Voice recognition was used to generate this report, which may have resulted in some phonetic based errors in grammar and contents. Even though attempts were made to correct all the mistakes, some may have been missed, and remained in the body of the document.           Martha Lund MD

## 2022-07-15 NOTE — PATIENT INSTRUCTIONS
Body Mass Index: Care Instructions  Your Care Instructions     Body mass index (BMI) can help you see if your weight is raising your risk for health problems. It uses a formula to compare how much you weigh with how tall you are. · A BMI lower than 18.5 is considered underweight. · A BMI between 18.5 and 24.9 is considered healthy. · A BMI between 25 and 29.9 is considered overweight. A BMI of 30 or higher is considered obese. If your BMI is in the normal range, it means that you have a lower risk for weight-related health problems. If your BMI is in the overweight or obese range, you may be at increased risk for weight-related health problems, such as high blood pressure, heart disease, stroke, arthritis or joint pain, and diabetes. If your BMI is in the underweight range, you may be at increased risk for health problems such as fatigue, lower protection (immunity) against illness, muscle loss, bone loss, hair loss, and hormone problems. BMI is just one measure of your risk for weight-related health problems. You may be at higher risk for health problems if you are not active, you eat an unhealthy diet, or you drink too much alcohol or use tobacco products. Follow-up care is a key part of your treatment and safety. Be sure to make and go to all appointments, and call your doctor if you are having problems. It's also a good idea to know your test results and keep a list of the medicines you take. How can you care for yourself at home? · Practice healthy eating habits. This includes eating plenty of fruits, vegetables, whole grains, lean protein, and low-fat dairy. · If your doctor recommends it, get more exercise. Walking is a good choice. Bit by bit, increase the amount you walk every day. Try for at least 30 minutes on most days of the week. · Do not smoke. Smoking can increase your risk for health problems. If you need help quitting, talk to your doctor about stop-smoking programs and medicines. These can increase your chances of quitting for good. · Limit alcohol to 2 drinks a day for men and 1 drink a day for women. Too much alcohol can cause health problems. If you have a BMI higher than 25  · Your doctor may do other tests to check your risk for weight-related health problems. This may include measuring the distance around your waist. A waist measurement of more than 40 inches in men or 35 inches in women can increase the risk of weight-related health problems. · Talk with your doctor about steps you can take to stay healthy or improve your health. You may need to make lifestyle changes to lose weight and stay healthy, such as changing your diet and getting regular exercise. If you have a BMI lower than 18.5  · Your doctor may do other tests to check your risk for health problems. · Talk with your doctor about steps you can take to stay healthy or improve your health. You may need to make lifestyle changes to gain or maintain weight and stay healthy, such as getting more healthy foods in your diet and doing exercises to build muscle. Where can you learn more? Go to http://www.[employer].com/  Enter S176 in the search box to learn more about \"Body Mass Index: Care Instructions. \"  Current as of: December 27, 2021               Content Version: 13.2  © 2006-2022 [employer]. Care instructions adapted under license by [facility] (which disclaims liability or warranty for this information). If you have questions about a medical condition or this instruction, always ask your healthcare professional. [place] [address] any warranty or liability for your use of this information.

## 2022-09-02 ENCOUNTER — APPOINTMENT (OUTPATIENT)
Dept: GENERAL RADIOLOGY | Age: 52
End: 2022-09-02
Attending: EMERGENCY MEDICINE
Payer: COMMERCIAL

## 2022-09-02 ENCOUNTER — HOSPITAL ENCOUNTER (EMERGENCY)
Age: 52
Discharge: HOME | End: 2022-09-02
Attending: EMERGENCY MEDICINE
Payer: COMMERCIAL

## 2022-09-02 VITALS
RESPIRATION RATE: 17 BRPM | TEMPERATURE: 98.7 F | HEART RATE: 92 BPM | SYSTOLIC BLOOD PRESSURE: 183 MMHG | BODY MASS INDEX: 24.59 KG/M2 | OXYGEN SATURATION: 99 % | HEIGHT: 66 IN | WEIGHT: 153 LBS | DIASTOLIC BLOOD PRESSURE: 105 MMHG

## 2022-09-02 DIAGNOSIS — S82.034A CLOSED NONDISPLACED TRANSVERSE FRACTURE OF RIGHT PATELLA, INITIAL ENCOUNTER: Primary | ICD-10-CM

## 2022-09-02 PROCEDURE — 74011250636 HC RX REV CODE- 250/636: Performed by: EMERGENCY MEDICINE

## 2022-09-02 PROCEDURE — 73502 X-RAY EXAM HIP UNI 2-3 VIEWS: CPT

## 2022-09-02 PROCEDURE — 73564 X-RAY EXAM KNEE 4 OR MORE: CPT

## 2022-09-02 PROCEDURE — 99284 EMERGENCY DEPT VISIT MOD MDM: CPT

## 2022-09-02 PROCEDURE — 96374 THER/PROPH/DIAG INJ IV PUSH: CPT

## 2022-09-02 RX ORDER — SODIUM CHLORIDE 9 MG/ML
150 INJECTION, SOLUTION INTRAVENOUS ONCE
Status: COMPLETED | OUTPATIENT
Start: 2022-09-02 | End: 2022-09-02

## 2022-09-02 RX ORDER — OXYCODONE AND ACETAMINOPHEN 5; 325 MG/1; MG/1
1 TABLET ORAL
Qty: 16 TABLET | Refills: 0 | Status: SHIPPED | OUTPATIENT
Start: 2022-09-02 | End: 2022-09-05

## 2022-09-02 RX ORDER — MORPHINE SULFATE 2 MG/ML
2 INJECTION, SOLUTION INTRAMUSCULAR; INTRAVENOUS
Status: COMPLETED | OUTPATIENT
Start: 2022-09-02 | End: 2022-09-02

## 2022-09-02 RX ORDER — MORPHINE SULFATE 4 MG/ML
4 INJECTION INTRAVENOUS ONCE
Status: DISCONTINUED | OUTPATIENT
Start: 2022-09-02 | End: 2022-09-02

## 2022-09-02 RX ADMIN — MORPHINE SULFATE 2 MG: 2 INJECTION, SOLUTION INTRAMUSCULAR; INTRAVENOUS at 20:24

## 2022-09-02 RX ADMIN — SODIUM CHLORIDE 150 ML/HR: 9 INJECTION, SOLUTION INTRAVENOUS at 20:04

## 2022-09-02 NOTE — Clinical Note
[facility] EMERGENCY DEPT  [id number] [address] [phone]    Work/School Note    Date: 9/2/2022    To Whom It May concern:    Quan Jordan was seen and treated today in the emergency room by the following provider(s):  Attending Provider: Patricia Sena MD.      Quan Jordan is excused from work/school on 9/2/2022 through 9/4/2022. She is medically clear to return to work/school on 9/5/2022.          Sincerely,          Tate Arzate MD

## 2022-09-03 NOTE — ED TRIAGE NOTES
Patient reports that she was home and was goofing off and tried to push her  in the pool and fell onto her knee and now her knee hurts and she cannot straighten it on put weight on it, she took some ibuprofen and ice but that did not help.

## 2022-09-03 NOTE — ED PROVIDER NOTES
EMERGENCY DEPARTMENT HISTORY AND PHYSICAL EXAM    8:04 PM patient seen at this time in room 12      Date: 9/2/2022  Patient Name: Abigail Downs    History of Presenting Illness     Chief Complaint   Patient presents with    Knee Pain         History Provided By: patient    Additional History (Context): Abigail Downs is a 47 y.o. female presents with was fooling around with her family running by the side of a pool and slipped landing on her right knee And rolling onto her right hip. Having severe pain in the right knee less pain in the right hip. Unable to bear weight. Took ibuprofen and applied ice. Brought in by EMS. Did not strike her head or lose consciousness. History of high cholesterol and a stress-induced heart attack. PCP: Clara Hawk MD    Chief Complaint:   Duration:    Timing:    Location:   Quality:   Severity:   Modifying Factors:   Associated Symptoms:       Current Outpatient Medications   Medication Sig Dispense Refill    oxyCODONE-acetaminophen (Percocet) 5-325 mg per tablet Take 1 Tablet by mouth every four (4) hours as needed for Pain for up to 3 days. Max Daily Amount: 6 Tablets. 16 Tablet 0    levothyroxine (SYNTHROID) 125 mcg tablet TAKE 1 TABLET BY MOUTH ONE TIME A DAY BEFORE BREAKFAST 90 Tablet 1    simvastatin (ZOCOR) 20 mg tablet Take 1 Tablet by mouth nightly. 90 Tablet 1    loratadine (Claritin) 10 mg tablet Take 10 mg by mouth. cholecalciferol, vitamin D3, (VITAMIN D3 PO) Take  by mouth daily. multivitamin (ONE A DAY) tablet Take 1 Tab by mouth daily.  Indications: VITAMIN DEFICIENCY PREVENTION         Past History     Past Medical History:  Past Medical History:   Diagnosis Date    Contact dermatitis and eczema due to cause 1996    Hydradenitis    GERD (gastroesophageal reflux disease)     Hypercholesterolemia 2016    Hypertension 10/2017    Mitral valve prolapse     Other ill-defined conditions(799.89)     mitral valve prolapse    Thyroid disease hypo       Past Surgical History:  Past Surgical History:   Procedure Laterality Date    HX GYN      ov cyst    HX GYN      d & c's    HX HYSTERECTOMY      HX OTHER SURGICAL         Family History:  Family History   Problem Relation Age of Onset    Thyroid Disease Mother     Elevated Lipids Mother     Hypertension Mother     Lung Disease Mother         COPD    Heart Disease Father     Hypertension Father     Diabetes Father     Cancer Father         Skin    Elevated Lipids Father     Cataract Brother     Elevated Lipids Sister        Social History:  Social History     Tobacco Use    Smoking status: Former     Years: 4.00     Types: Cigarettes    Smokeless tobacco: Never   Vaping Use    Vaping Use: Never used   Substance Use Topics    Alcohol use: Not Currently     Comment: occasionally    Drug use: No       Allergies: Allergies   Allergen Reactions    Morphine Other (comments)     Patient reports some respiratory depression with IV dose         Review of Systems     Review of Systems   Constitutional:  Negative for diaphoresis and fever. HENT:  Negative for congestion and sore throat. Eyes:  Negative for pain and itching. Respiratory:  Negative for cough and shortness of breath. Cardiovascular:  Negative for chest pain and palpitations. Gastrointestinal:  Negative for abdominal pain and diarrhea. Endocrine: Negative for polydipsia and polyuria. Genitourinary:  Negative for dysuria and hematuria. Musculoskeletal:  Positive for arthralgias. Negative for myalgias. Skin:  Negative for rash and wound. Neurological:  Negative for seizures and syncope. Hematological:  Does not bruise/bleed easily. Psychiatric/Behavioral:  Negative for agitation and hallucinations.         Physical Exam       Patient Vitals for the past 12 hrs:   Temp Pulse Resp BP SpO2   09/02/22 2025 98.7 °F (37.1 °C) -- -- -- --   09/02/22 2001 -- 92 17 (!) 183/105 99 %       IPVITALS  Patient Vitals for the past 24 hrs: BP Temp Pulse Resp SpO2 Height Weight   09/02/22 2025 -- 98.7 °F (37.1 °C) -- -- -- -- --   09/02/22 2006 -- -- -- -- -- 5' 5.5\" (1.664 m) 69.4 kg (153 lb)   09/02/22 2001 (!) 183/105 -- 92 17 99 % -- --       Physical Exam  Vitals and nursing note reviewed. Constitutional:       Appearance: She is well-developed. HENT:      Head: Normocephalic and atraumatic. Eyes:      General: No scleral icterus. Conjunctiva/sclera: Conjunctivae normal.   Neck:      Vascular: No JVD. Cardiovascular:      Rate and Rhythm: Normal rate and regular rhythm. Heart sounds: Normal heart sounds. Comments: 4 intact extremity pulses  Pulmonary:      Effort: Pulmonary effort is normal.      Breath sounds: Normal breath sounds. Abdominal:      Palpations: Abdomen is soft. There is no mass. Tenderness: There is no abdominal tenderness. Musculoskeletal:         General: Normal range of motion. Cervical back: Normal range of motion and neck supple. Comments: Range of motion limited by pain in the right extremity, some tenderness anterior of the right hip no pain over the greater trochanter no differential deformity. Abrasion of the patella on the right side minimal effusion, does not tolerate extension of the knee. Neurovascularly intact   Lymphadenopathy:      Cervical: No cervical adenopathy. Skin:     General: Skin is warm and dry. Neurological:      Mental Status: She is alert. Diagnostic Study Results   Labs -  No results found for this or any previous visit (from the past 24 hour(s)). Radiologic Studies -   XR KNEE RT MIN 4 V    (Results Pending)   XR HIP RT W OR WO PELV 2-3 VWS    (Results Pending)     No results found.     Medications ordered:   Medications   0.9% sodium chloride infusion (150 mL/hr IntraVENous New Bag 9/2/22 2004)   morphine injection 2 mg (2 mg IntraVENous Given 9/2/22 2024)         Medical Decision Making   Initial Medical Decision Making and DDx:  Negative for fracture by my read, right knee has transverse patellar fracture. Discussion with the patient she is feeling better as far as her pain goes hip does not hurt. Discussion over phone with Dr. Dorado [facility] orthopedics, wants knee immobilizer and follow-up Monday. Work note, prescription for [employer]. ED Course: Progress Notes, Reevaluation, and Consults:         I am the first provider for this patient. I reviewed the vital signs, available nursing notes, past medical history, past surgical history, family history and social history. Patient Vitals for the past 12 hrs:   Temp Pulse Resp BP SpO2   09/02/22 2025 98.7 °F (37.1 °C) -- -- -- --   09/02/22 2001 -- 92 17 (!) 183/105 99 %       Vital Signs-Reviewed the patient's vital signs. Pulse Oximetry Analysis, Cardiac Monitor, 12 lead ekg:      Interpreted by the EP. Records Reviewed: Nursing notes reviewed (Time of Review: 8:04 PM)    Procedures:   Critical Care Time:   Aspirin: (was aspirin given for stroke?)    Diagnosis     Clinical Impression:   1. Closed nondisplaced transverse fracture of right patella, initial encounter        Disposition: Discharged      Follow-up Information       Follow up With Specialties Details Why Contact Info    Compa Hinojosa MD Orthopedic Surgery In 2 days  [address]  [phone]               Patient's Medications   Start Taking    OXYCODONE-ACETAMINOPHEN (PERCOCET) 5-325 MG PER TABLET    Take 1 Tablet by mouth every four (4) hours as needed for Pain for up to 3 days. Max Daily Amount: 6 Tablets. Continue Taking    CHOLECALCIFEROL, VITAMIN D3, (VITAMIN D3 PO)    Take  by mouth daily. LEVOTHYROXINE (SYNTHROID) 125 MCG TABLET    TAKE 1 TABLET BY MOUTH ONE TIME A DAY BEFORE BREAKFAST    LORATADINE (CLARITIN) 10 MG TABLET    Take 10 mg by mouth. MULTIVITAMIN (ONE A DAY) TABLET    Take 1 Tab by mouth daily.  Indications: VITAMIN DEFICIENCY PREVENTION    SIMVASTATIN (ZOCOR) 20 MG TABLET    Take 1 Tablet by mouth nightly. These Medications have changed    No medications on file   Stop Taking    CLOBETASOL (TEMOVATE) 0.05 % OINTMENT    Apply  to affected area two (2) times a day. CYCLOBENZAPRINE (FLEXERIL) 5 MG TABLET    Take 1 Tablet by mouth three (3) times daily as needed for Muscle Spasm(s). DOXYCYCLINE (MONODOX) 100 MG CAPSULE    TAKE 1 CAPSULE BY MOUTH 2 TIMES A DAY    IBUPROFEN (MOTRIN) 800 MG TABLET    Take 1 Tab by mouth every eight (8) hours as needed for Pain (with food).      _______________________________    Notes:    Kp Hill MD using Dragon dictation      _______________________________

## 2022-09-03 NOTE — ED NOTES
Knee immobilizer applied to right knee. Crutch education provided return demonstration of correct use observed.

## 2022-09-07 ENCOUNTER — OFFICE VISIT (OUTPATIENT)
Dept: ORTHOPEDIC SURGERY | Age: 52
End: 2022-09-07
Payer: COMMERCIAL

## 2022-09-07 ENCOUNTER — HOSPITAL ENCOUNTER (OUTPATIENT)
Dept: LAB | Age: 52
Discharge: HOME | End: 2022-09-07
Payer: COMMERCIAL

## 2022-09-07 VITALS — HEIGHT: 66 IN | HEART RATE: 77 BPM | OXYGEN SATURATION: 99 % | TEMPERATURE: 97 F | BODY MASS INDEX: 25.07 KG/M2

## 2022-09-07 DIAGNOSIS — F11.99 OPIOID USE, UNSPECIFIED WITH UNSPECIFIED OPIOID-INDUCED DISORDER (HCC): ICD-10-CM

## 2022-09-07 DIAGNOSIS — S82.001A CLOSED DISPLACED FRACTURE OF RIGHT PATELLA, UNSPECIFIED FRACTURE MORPHOLOGY, INITIAL ENCOUNTER: Primary | ICD-10-CM

## 2022-09-07 DIAGNOSIS — S82.001A CLOSED DISPLACED FRACTURE OF RIGHT PATELLA, UNSPECIFIED FRACTURE MORPHOLOGY, INITIAL ENCOUNTER: ICD-10-CM

## 2022-09-07 LAB
ANION GAP SERPL CALC-SCNC: 7 MMOL/L (ref 3–18)
BASOPHILS # BLD: 0.1 K/UL (ref 0–0.1)
BASOPHILS NFR BLD: 1 % (ref 0–2)
BUN SERPL-MCNC: 11 MG/DL (ref 7–18)
BUN/CREAT SERPL: 14 (ref 12–20)
CALCIUM SERPL-MCNC: 9.9 MG/DL (ref 8.5–10.1)
CHLORIDE SERPL-SCNC: 104 MMOL/L (ref 100–111)
CO2 SERPL-SCNC: 27 MMOL/L (ref 21–32)
CREAT SERPL-MCNC: 0.79 MG/DL (ref 0.6–1.3)
DIFFERENTIAL METHOD BLD: ABNORMAL
EOSINOPHIL # BLD: 0.2 K/UL (ref 0–0.4)
EOSINOPHIL NFR BLD: 2 % (ref 0–5)
ERYTHROCYTE [DISTWIDTH] IN BLOOD BY AUTOMATED COUNT: 11 % (ref 11.6–14.5)
GLUCOSE SERPL-MCNC: 109 MG/DL (ref 74–99)
HCT VFR BLD AUTO: 43.2 % (ref 35–45)
HGB BLD-MCNC: 14.7 G/DL (ref 12–16)
IMM GRANULOCYTES # BLD AUTO: 0 K/UL (ref 0–0.04)
IMM GRANULOCYTES NFR BLD AUTO: 0 % (ref 0–0.5)
LYMPHOCYTES # BLD: 2.2 K/UL (ref 0.9–3.6)
LYMPHOCYTES NFR BLD: 32 % (ref 21–52)
MCH RBC QN AUTO: 33.9 PG (ref 24–34)
MCHC RBC AUTO-ENTMCNC: 34 G/DL (ref 31–37)
MCV RBC AUTO: 99.8 FL (ref 78–100)
MONOCYTES # BLD: 0.9 K/UL (ref 0.05–1.2)
MONOCYTES NFR BLD: 13 % (ref 3–10)
NEUTS SEG # BLD: 3.6 K/UL (ref 1.8–8)
NEUTS SEG NFR BLD: 51 % (ref 40–73)
NRBC # BLD: 0 K/UL (ref 0–0.01)
NRBC BLD-RTO: 0 PER 100 WBC
PLATELET # BLD AUTO: 277 K/UL (ref 135–420)
PMV BLD AUTO: 9.8 FL (ref 9.2–11.8)
POTASSIUM SERPL-SCNC: 3.8 MMOL/L (ref 3.5–5.5)
RBC # BLD AUTO: 4.33 M/UL (ref 4.2–5.3)
SODIUM SERPL-SCNC: 138 MMOL/L (ref 136–145)
WBC # BLD AUTO: 6.9 K/UL (ref 4.6–13.2)

## 2022-09-07 PROCEDURE — 99203 OFFICE O/P NEW LOW 30 MIN: CPT | Performed by: ORTHOPAEDIC SURGERY

## 2022-09-07 PROCEDURE — 93005 ELECTROCARDIOGRAM TRACING: CPT

## 2022-09-07 PROCEDURE — 85025 COMPLETE CBC W/AUTO DIFF WBC: CPT

## 2022-09-07 PROCEDURE — 36415 COLL VENOUS BLD VENIPUNCTURE: CPT

## 2022-09-07 PROCEDURE — 80048 BASIC METABOLIC PNL TOTAL CA: CPT

## 2022-09-07 RX ORDER — OXYCODONE AND ACETAMINOPHEN 5; 325 MG/1; MG/1
1 TABLET ORAL
Qty: 40 TABLET | Refills: 0 | Status: SHIPPED | OUTPATIENT
Start: 2022-09-07 | End: 2022-09-14

## 2022-09-07 RX ORDER — DOXYCYCLINE 100 MG/1
100 CAPSULE ORAL 2 TIMES DAILY
COMMUNITY
End: 2022-10-03

## 2022-09-07 NOTE — PROGRESS NOTES
Julio Cesar Priest  1970   Chief Complaint   Patient presents with    Knee Pain        HISTORY OF PRESENT ILLNESS  Julio Cesar Priest is a 47 y.o. female who presents today for evaluation of right knee pain. She rates her pain 2/10 today. Pain has been present since 9/02/2022 after falling onto concrete onto the knee. Went to the ED. Presents today wearing knee brace and ambulating with crutches. Notes improvement in pain since initial onset. She states she has started taking 80 MG baby Aspirin on her own as a precaution for DVT prophylaxis.  Has tried following treatments: Injections:NO; Brace:YES; Therapy:NO; Cane/Crutch:YES       Allergies   Allergen Reactions    Morphine Other (comments)     Patient reports some respiratory depression with IV dose        Past Medical History:   Diagnosis Date    Contact dermatitis and eczema due to cause 1996    Hydradenitis    GERD (gastroesophageal reflux disease)     Hypercholesterolemia 2016    Hypertension 10/2017    Mitral valve prolapse     Other ill-defined conditions(799.89)     mitral valve prolapse    Thyroid disease     hypo      Social History     Socioeconomic History    Marital status:      Spouse name: Not on file    Number of children: Not on file    Years of education: Not on file    Highest education level: Not on file   Occupational History    Not on file   Tobacco Use    Smoking status: Some Days     Packs/day: 0.25     Years: 4.00     Pack years: 1.00     Types: Cigarettes    Smokeless tobacco: Never   Vaping Use    Vaping Use: Never used   Substance and Sexual Activity    Alcohol use: Not Currently     Comment: occasionally    Drug use: No    Sexual activity: Yes     Partners: Male     Birth control/protection: None, Surgical   Other Topics Concern    Not on file   Social History Narrative    Not on file     Social Determinants of Health     Financial Resource Strain: Not on file   Food Insecurity: Not on file   Transportation Needs: Not on file   Physical Activity: Not on file   Stress: Not on file   Social Connections: Not on file   Intimate Partner Violence: Not on file   Housing Stability: Not on file      Past Surgical History:   Procedure Laterality Date    HX GYN      ov cyst    HX GYN      d & c's    HX HYSTERECTOMY      HX OTHER SURGICAL        Family History   Problem Relation Age of Onset    Thyroid Disease Mother     Elevated Lipids Mother     Hypertension Mother     Lung Disease Mother         COPD    Heart Disease Father     Hypertension Father     Diabetes Father     Cancer Father         Skin    Elevated Lipids Father     Cataract Brother     Elevated Lipids Sister       Current Outpatient Medications   Medication Sig    doxycycline (MONODOX) 100 mg capsule Take 100 mg by mouth two (2) times a day. levothyroxine (SYNTHROID) 125 mcg tablet TAKE 1 TABLET BY MOUTH ONE TIME A DAY BEFORE BREAKFAST    simvastatin (ZOCOR) 20 mg tablet Take 1 Tablet by mouth nightly. loratadine (CLARITIN) 10 mg tablet Take 10 mg by mouth. cholecalciferol, vitamin D3, (VITAMIN D3 PO) Take  by mouth daily. multivitamin (ONE A DAY) tablet Take 1 Tab by mouth daily. Indications: VITAMIN DEFICIENCY PREVENTION     No current facility-administered medications for this visit. REVIEW OF SYSTEM   Patient denies: Weight loss, Fever/Chills, HA, Visual changes, Fatigue, Chest pain, SOB, Abdominal pain, N/V/D/C, Blood in stool or urine, Edema. Pertinent positive as above in HPI. All others were negative    PHYSICAL EXAM:   Visit Vitals  Pulse 77   Temp 97 °F (36.1 °C) (Temporal)   Ht 5' 5.5\" (1.664 m)   LMP 08/10/2013   SpO2 99%   BMI 25.07 kg/m²     The patient is a well-developed, well-nourished female   in no acute distress. The patient is alert and oriented times three. The patient is alert and oriented times three.  Mood and affect are normal.  LYMPHATIC: lymph nodes are not enlarged and are within normal limits  SKIN: normal in color and non tender to palpation. There are no bruises or abrasions noted. NEUROLOGICAL: Motor sensory exam is within normal limits. Reflexes are equal bilaterally. There is normal sensation to pinprick and light touch  MUSCULOSKELETAL:  Examination Right knee   Skin Intact   Range of motion 10-30   Effusion +   Medial joint line tenderness +   Lateral joint line tenderness -   Tenderness Pes Bursa -   Tenderness insertion MCL -   Tenderness insertion LCL -   Rosss -   Patella crepitus -   Patella grind -   Lachman -   Pivot shift -   Anterior drawer -   Posterior drawer -   Varus stress -   Valgus stress -   Neurovascular Intact   Calf Swelling and Tenderness to Palpation -   Alton's Test -   Hamstring Cord Tightness -   Ttp mid patella     IMAGING: XR of the right knee with 2 views obtained in the office dated 9/07/2022 was reviewed and read by Dr. Jessie Quintero: displaced distal third patella fracture       XR of the right knee with 3 views obtained at [facility] dated 9/02/2022 was reviewed and read by Dr. Jessie Quintero:   IMPRESSION  1. Transverse patellar fracture with mild diastases of the fracture  components. 2.  No acute pathology appreciated in the right hip. IMPRESSION:      ICD-10-CM ICD-9-CM    1. Closed displaced fracture of right patella, unspecified fracture morphology, initial encounter  S82.001A 822.0 AMB POC XRAY, KNEE; 1/2 VIEWS           PLAN:  1. Pt presents today with right knee pain due to a displaced transverse patellar fracture and we will be proceeding with surgery. I discussed the risks and benefits and potential adverse outcomes of both operative vs non operative treatment of right patellar fracture with the patient and patient wishes to proceed with ORIF of right patella.       Risks of operative intervention include but not limited to bleeding, infection, deep vein thrombosis, pulmonary embolism, death, limb length discrepancy, reflexive sympathetic dystrophy, fat embolism syndrome,damage to blood vessels and nerves, malunion, non-union, delayed union, failure of hardware, post traumatic arthritis, stroke, heart attack, and death. Patient understands that infection may arise and may require numerous surgeries. The patient was counseled at length about the risks of radha Covid-19 during their perioperative period and any recovery window from their procedure. The patient was made aware that radha Covid-19  may worsen their prognosis for recovering from their procedure and lend to a higher morbidity and/or mortality risk. All material risks, benefits, and reasonable alternatives including postponing the procedure were discussed. The patient does  wish to proceed with the procedure at this time. History and physical exam performed in the office today    Risk factors include: htn  2. No ultrasound exam indicated today  3. No cortisone injection indicated today   4. No Physical/Occupational Therapy indicated today  5. No diagnostic test indicated today:   6. No durable medical equipment indicated today  7. No referral indicated today   8. No medications indicated today:   9. No Narcotic indicated today      RTC post-op      Scribed by Jose [address]) as dictated by Vania Patel MD    I, Dr. Vania Patel, confirm that all documentation is accurate.     Vania Patel M.D.   [facility]

## 2022-09-08 NOTE — PROGRESS NOTES
HISTORY AND PHYSICAL          Patient: Morgan Leal                MRN: [id number]       SSN: [id number]  YOB: 1970          AGE: 47 y.o. SEX: female      Patient scheduled for: ORIF right patella  Surgeon: Jennifer Hercules MD    ANESTHESIA TYPE:  General    HISTORY:     The patient was seen in the office today for a preoperative history and physical for an upcoming above listed surgery. The patient is a pleasant 47 y.o. female who has a history of right knee pain. She rates her pain 2/10 today. Pain has been present since 9/02/2022 after falling onto concrete onto the knee. Went to the ED. Presents today wearing knee brace and ambulating with crutches. Notes improvement in pain since initial onset. She states she has started taking 80 MG baby Aspirin on her own as a precaution for DVT prophylaxis. Has tried following treatments: Injections:NO; Brace:YES; Therapy:NO; Cane/Crutch:YES     Due to the current findings, affected activity of daily living and continued pain and discomfort, surgical intervention is indicated. The alternatives, risks, and complications, including but not limited to infection, blood loss, need for blood transfusion, neurovascular damage, vazquez-incisional numbness, subcutaneous hematoma, bone fracture, anesthetic complications, DVT, PE, death, RSD, postoperative stiffness and pain, possible surgical scar, delayed healing and nonhealing, reflexive sympathetic dystrophy, damage to blood vessels and nerves, need for more surgery, MI, and stroke,  failure of hardware, gait disturbances,have been discussed. The patient understands and wishes to proceed with surgery.      PAST MEDICAL HISTORY:     Past Medical History:   Diagnosis Date    Contact dermatitis and eczema due to cause 1996    Hydradenitis    GERD (gastroesophageal reflux disease)     Hypercholesterolemia 2016    Hypertension 10/2017    Mitral valve prolapse     Other ill-defined conditions(799.89) mitral valve prolapse    Thyroid disease     hypo       CURRENT MEDICATIONS:     Current Outpatient Medications   Medication Sig Dispense Refill    doxycycline (MONODOX) 100 mg capsule Take 100 mg by mouth two (2) times a day. oxyCODONE-acetaminophen (Percocet) 5-325 mg per tablet Take 1 Tablet by mouth every four (4) hours as needed for Pain for up to 7 days. Max Daily Amount: 6 Tablets. Do not take until after surgery (for acute post operative pain) 40 Tablet 0    [START ON 9/13/2022] rivaroxaban (Xarelto) 10 mg tablet Take 1 Tablet by mouth daily (with breakfast). 14 Tablet 0    levothyroxine (SYNTHROID) 125 mcg tablet TAKE 1 TABLET BY MOUTH ONE TIME A DAY BEFORE BREAKFAST 90 Tablet 1    simvastatin (ZOCOR) 20 mg tablet Take 1 Tablet by mouth nightly. 90 Tablet 1    loratadine (CLARITIN) 10 mg tablet Take 10 mg by mouth. cholecalciferol, vitamin D3, (VITAMIN D3 PO) Take  by mouth daily. multivitamin (ONE A DAY) tablet Take 1 Tab by mouth daily.  Indications: VITAMIN DEFICIENCY PREVENTION         ALLERGIES:     Allergies   Allergen Reactions    Morphine Other (comments)     Patient reports some respiratory depression with IV dose         SURGICAL HISTORY:     Past Surgical History:   Procedure Laterality Date    HX GYN      ov cyst    HX GYN      d & c's    HX HYSTERECTOMY      HX OTHER SURGICAL         SOCIAL HISTORY:     Social History     Socioeconomic History    Marital status:    Tobacco Use    Smoking status: Some Days     Packs/day: 0.25     Years: 4.00     Pack years: 1.00     Types: Cigarettes    Smokeless tobacco: Never   Vaping Use    Vaping Use: Never used   Substance and Sexual Activity    Alcohol use: Not Currently     Comment: occasionally    Drug use: No    Sexual activity: Yes     Partners: Male     Birth control/protection: None, Surgical       FAMILY HISTORY:     Family History   Problem Relation Age of Onset    Thyroid Disease Mother     Elevated Lipids Mother Hypertension Mother     Lung Disease Mother         COPD    Heart Disease Father     Hypertension Father     Diabetes Father     Cancer Father         Skin    Elevated Lipids Father     Cataract Brother     Elevated Lipids Sister        REVIEW OF SYSTEMS:     Negative for fevers, chills, chest pain, shortness of breath, weight loss, recent illness     General: Negative for fever and chills. No unexpected change in weight. Denies fatigue. No change in appetite. Skin: Negative for rash or itching. HEENT: Negative for congestion, sore throat, neck pain and neck stiffness. No change in vision or hearing. Hasn't noted any enlarged lymph nodes in the neck. Cardiovascular:  Negative for chest pain and palpitations. Has not noted pedal edema. Respiratory: Negative for cough, colds, sinus, hemoptysis, shortness of breath and wheezing. Gastrointestinal: Negative for nausea and vomiting, rectal bleeding, coffee ground emesis, abdominal pain, diarrhea and constipation. Genitourinary: Negative for dysuria, frequency urgency, or burning on micturition. No flank pain, no foul smelling urine, no difficulty with initiating urination. Hematological: Negative for bleeding or easy bruising. Musculoskeletal: Negative  for arthralgias, back pain or neck pain. Neurological: Negative for dizziness, seizures or syncopal episodes. Denies headaches. Endocrine: Denies excessive thirst.  No heat/cold intolerance. Psychiatric: Negative for depression or insomnia. PHYSICAL EXAMINATION:     VITALS: Visit Vitals  Pulse 77   Temp 97 °F (36.1 °C) (Temporal)   Ht 5' 5.5\" (1.664 m)   LMP 08/10/2013   SpO2 99%   BMI 25.07 kg/m²     GEN:  Well developed, well nourished 47 y.o. female in no acute distress. HEENT: Normocephalic and atraumatic. Eyes: Conjunctivae and EOM are normal.Pupils are equal, round, and reactive to light. External ear normal appearance, external nose normal appearing.  Mouth/Throat: Oropharynx is clear and moist, able to handle oral secretions w/out difficulty, airway patent  NECK: Supple. Normal ROM, No lymphadenopathy. Trachea is midline. No bruising, swelling or deformity  RESP: Clear to auscultation bilaterally. No wheezes, rales, rhonchi. Normal effort and breath sounds. No respiratory distress  CARDIO:  Normal rate, regular rhythm and normal heart sounds. No MGR. ABDOMEN: Soft, non-tender, non-distended, normoactive bowel sounds in all four quadrants. There is no tenderness. There is no rebound and no guarding. BACK: No CVA or spinal tenderness  BREAST:  Deferred  PELVIC:    Deferred   RECTAL:  Deferred   :           Deferred  EXTREMITIES: EXAMINATION OF: right knee  Examination Right knee   Skin Intact   Range of motion 10-30   Effusion +   Medial joint line tenderness +   Lateral joint line tenderness -   Tenderness Pes Bursa -   Tenderness insertion MCL -   Tenderness insertion LCL -   Rosss -   Patella crepitus -   Patella grind -   Lachman -   Pivot shift -   Anterior drawer -   Posterior drawer -   Varus stress -   Valgus stress -   Neurovascular Intact   Calf Swelling and Tenderness to Palpation -   Alton's Test -   Hamstring Cord Tightness -   Ttp mid patella    NEUROVASCULAR: Sensation intact to light touch and strength grossly intact and symmetrical. No nystagmus. Positive distal pulses and capillary refill. DVT ASSESSMENT:  There is not  calf tenderness. No evidence of DVT seen on physical exam.  MOTOR: In tact  PSYCH: Alert an oriented to person, place and time. Mood, memory, affect, behavior and judgment normal       RADIOGRAPHS & DIAGNOSTIC STUDIES:     MRI/xray reveals :    IMAGING: XR of the right knee with 2 views obtained in the office dated 9/07/2022 was reviewed and read by Dr. Alex Peñaloza: displaced transverse fracture of patella        XR of the right knee with 3 views obtained at [facility] dated 9/02/2022 was reviewed and read by Dr. Alex Peñaloza:   IMPRESSION  1.    Transverse patellar fracture with mild diastases of the fracture  components. 2.  No acute pathology appreciated in the right hip. LABS:       @CBC:   Lab Results   Component Value Date/Time    WBC 6.9 09/07/2022 03:19 PM    RBC 4.33 09/07/2022 03:19 PM    HGB 14.7 09/07/2022 03:19 PM    HCT 43.2 09/07/2022 03:19 PM    PLATELET 431 74/92/8607 03:19 PM    and BMP:   Lab Results   Component Value Date/Time    Glucose 109 (H) 09/07/2022 03:19 PM    Sodium 138 09/07/2022 03:19 PM    Potassium 3.8 09/07/2022 03:19 PM    Chloride 104 09/07/2022 03:19 PM    CO2 27 09/07/2022 03:19 PM    BUN 11 09/07/2022 03:19 PM    Creatinine 0.79 09/07/2022 03:19 PM    Calcium 9.9 09/07/2022 03:19 PM   @    Preoperative labs were reviewed and are substantially within normal limits   EKG: normal EKG, normal sinus rhythm, unchanged from previous tracings. ASSESSMENT:       Encounter Diagnoses   Name Primary? Closed displaced fracture of right patella, unspecified fracture morphology, initial encounter Yes    Opioid use, unspecified with unspecified opioid-induced disorder        PLAN:     Again, the alternatives, risks, and complications, as well as expected outcome were discussed. The patient understands and agrees to proceed with ORIF right patella. The patient was counseled at length about the risks of radha Covid-19 during their perioperative period and any recovery window from their procedure. The patient was made aware that radha Covid-19  may worsen their prognosis for recovering from their procedure and lend to a higher morbidity and/or mortality risk. All material risks, benefits, and reasonable alternatives including postponing the procedure were discussed. The patient does  wish to proceed with the procedure at this time.    Patient given orders listed below:    Orders Placed This Encounter    AMB POC XRAY, KNEE; 1/2 VIEWS    CBC WITH AUTOMATED DIFF    METABOLIC PANEL, BASIC    [facility] [employer] [facility] [place] [facility]    EKG, 12 LEAD, INITIAL    doxycycline (MONODOX) 100 mg capsule    oxyCODONE-acetaminophen (Percocet) 5-325 mg per tablet    rivaroxaban (Xarelto) 10 mg tablet         Benny Runner, PA-C  9/8/2022  7:49 AM

## 2022-09-09 ENCOUNTER — TELEPHONE (OUTPATIENT)
Dept: ORTHOPEDIC SURGERY | Age: 52
End: 2022-09-09

## 2022-09-09 LAB
ATRIAL RATE: 71 BPM
CALCULATED P AXIS, ECG09: 59 DEGREES
CALCULATED R AXIS, ECG10: 73 DEGREES
CALCULATED T AXIS, ECG11: 33 DEGREES
DIAGNOSIS, 93000: NORMAL
P-R INTERVAL, ECG05: 128 MS
Q-T INTERVAL, ECG07: 396 MS
QRS DURATION, ECG06: 90 MS
QTC CALCULATION (BEZET), ECG08: 430 MS
VENTRICULAR RATE, ECG03: 71 BPM

## 2022-09-09 NOTE — LETTER
NOTIFICATION RETURN TO WORK / SCHOOL    9/9/2022 2:46 PM    Ms. Radha Bearden  [address] [place] [phone]      To Whom It May Concern:    Radha Bearden is currently under the care of John Varinder Ladd. Patient will return to work sedentary duty only after 2 weeks from date of surgery. If need to walk any distance or do patient care she will be out for 8-12 weeks    If there are questions or concerns please have the patient contact our office.         Sincerely,      ANA Marin

## 2022-09-09 NOTE — TELEPHONE ENCOUNTER
Patient is having surgery on Monday and she would like to know how long she will be out of work. Please call her to advise.

## 2022-09-09 NOTE — TELEPHONE ENCOUNTER
Can return to work sedentary duty only after 2 weeks.  If need to walk any distance or do patient care she will be out for 8-12 weeks

## 2022-09-09 NOTE — TELEPHONE ENCOUNTER
Spoke w/ Pt, adv of ANA Redd's msg on return to work status. Pt states will be able to do sedentary duty after 2 weeks. Pt is faxing over [place] paperwork, expressed understanding of turnaround time on paperwork, adv will put work note in chart that can be printed as needed for the two weeks.

## 2022-09-12 ENCOUNTER — APPOINTMENT (OUTPATIENT)
Dept: VASCULAR SURGERY | Age: 52
End: 2022-09-12
Attending: EMERGENCY MEDICINE
Payer: COMMERCIAL

## 2022-09-12 ENCOUNTER — HOSPITAL ENCOUNTER (EMERGENCY)
Age: 52
Discharge: HOME | End: 2022-09-12
Attending: EMERGENCY MEDICINE
Payer: COMMERCIAL

## 2022-09-12 ENCOUNTER — DOCUMENTATION ONLY (OUTPATIENT)
Dept: ORTHOPEDIC SURGERY | Age: 52
End: 2022-09-12

## 2022-09-12 VITALS
WEIGHT: 150 LBS | TEMPERATURE: 98.2 F | SYSTOLIC BLOOD PRESSURE: 137 MMHG | RESPIRATION RATE: 20 BRPM | HEIGHT: 65 IN | DIASTOLIC BLOOD PRESSURE: 67 MMHG | OXYGEN SATURATION: 98 % | HEART RATE: 77 BPM | BODY MASS INDEX: 24.99 KG/M2

## 2022-09-12 DIAGNOSIS — M79.89 LEG SWELLING: Primary | ICD-10-CM

## 2022-09-12 PROCEDURE — 93971 EXTREMITY STUDY: CPT

## 2022-09-12 PROCEDURE — 99284 EMERGENCY DEPT VISIT MOD MDM: CPT

## 2022-09-12 PROCEDURE — 74011250637 HC RX REV CODE- 250/637: Performed by: EMERGENCY MEDICINE

## 2022-09-12 RX ORDER — OXYCODONE AND ACETAMINOPHEN 5; 325 MG/1; MG/1
1 TABLET ORAL
Status: COMPLETED | OUTPATIENT
Start: 2022-09-12 | End: 2022-09-12

## 2022-09-12 RX ADMIN — OXYCODONE HYDROCHLORIDE AND ACETAMINOPHEN 1 TABLET: 5; 325 TABLET ORAL at 16:48

## 2022-09-12 NOTE — ED NOTES
I have reviewed discharge instructions with the spouse. The spouse verbalized understanding.  Patient armband removed and shredded

## 2022-09-12 NOTE — ED PROVIDER NOTES
EMERGENCY DEPARTMENT HISTORY AND PHYSICAL EXAM    4:26 PM      Date: 9/12/2022  Patient Name: Destiny Villagran    History of Presenting Illness     Chief Complaint   Patient presents with    Knee Pain         History Provided By: Patient  Location/Duration/Severity/Modifying factors   52-year-old female presents after recent fall/injury, sustained a fracture of the right patella. Injury occurred Friday before Labor Day, was placed in a knee immobilizer for lower extremity protection. Subsequently underwent patella fracture surgical fixation today. Anesthesiologist was performing a nerve block postoperatively when he was concerned about superficial thrombosis. The patient was referred to the ED for a DVT study. She denies any chest pain or shortness of breath. No prior history of DVT/PE. Knee Pain       PCP: Daniel Naidu MD    Current Outpatient Medications   Medication Sig Dispense Refill    doxycycline (MONODOX) 100 mg capsule Take 100 mg by mouth two (2) times a day. oxyCODONE-acetaminophen (Percocet) 5-325 mg per tablet Take 1 Tablet by mouth every four (4) hours as needed for Pain for up to 7 days. Max Daily Amount: 6 Tablets. Do not take until after surgery (for acute post operative pain) 40 Tablet 0    [START ON 9/13/2022] rivaroxaban (Xarelto) 10 mg tablet Take 1 Tablet by mouth daily (with breakfast). 14 Tablet 0    levothyroxine (SYNTHROID) 125 mcg tablet TAKE 1 TABLET BY MOUTH ONE TIME A DAY BEFORE BREAKFAST 90 Tablet 1    simvastatin (ZOCOR) 20 mg tablet Take 1 Tablet by mouth nightly. 90 Tablet 1    loratadine (CLARITIN) 10 mg tablet Take 10 mg by mouth. cholecalciferol, vitamin D3, (VITAMIN D3 PO) Take  by mouth daily. multivitamin (ONE A DAY) tablet Take 1 Tab by mouth daily.  Indications: VITAMIN DEFICIENCY PREVENTION         Past History     Past Medical History:  Past Medical History:   Diagnosis Date    Contact dermatitis and eczema due to cause 1996 Hydradenitis    GERD (gastroesophageal reflux disease)     Hypercholesterolemia 2016    Hypertension 10/2017    Mitral valve prolapse     Other ill-defined conditions(799.89)     mitral valve prolapse    Thyroid disease     hypo       Past Surgical History:  Past Surgical History:   Procedure Laterality Date    HX GYN      ov cyst    HX GYN      d & c's    HX HYSTERECTOMY      HX OTHER SURGICAL         Family History:  Family History   Problem Relation Age of Onset    Thyroid Disease Mother     Elevated Lipids Mother     Hypertension Mother     Lung Disease Mother         COPD    Heart Disease Father     Hypertension Father     Diabetes Father     Cancer Father         Skin    Elevated Lipids Father     Cataract Brother     Elevated Lipids Sister        Social History:  Social History     Tobacco Use    Smoking status: Some Days     Packs/day: 0.25     Years: 4.00     Pack years: 1.00     Types: Cigarettes    Smokeless tobacco: Never   Vaping Use    Vaping Use: Never used   Substance Use Topics    Alcohol use: Not Currently     Comment: occasionally    Drug use: No       Allergies: Allergies   Allergen Reactions    Morphine Other (comments)     Patient reports some respiratory depression with IV dose         Review of Systems       Review of Systems   Constitutional:  Negative for fever. HENT:  Negative for congestion and sore throat. Respiratory:  Negative for shortness of breath. Cardiovascular:  Positive for leg swelling. Negative for chest pain. Gastrointestinal:  Negative for abdominal pain and vomiting. Genitourinary:  Negative for dysuria. Musculoskeletal:  Positive for arthralgias, gait problem and myalgias. Skin:  Negative for rash. Neurological:  Negative for syncope and weakness. Psychiatric/Behavioral:  Negative for confusion.         Physical Exam   Visit Vitals  /67   Pulse 77   Temp 98.2 °F (36.8 °C)   Resp 20   Ht 5' 5\" (1.651 m)   Wt 68 kg (150 lb)   LMP 08/10/2013   SpO2 98%   BMI 24.96 kg/m²         Physical Exam  Nursing note and vitals reviewed. General appearance: non-toxic, mildly anxious  Eyes: PERRL, EOMI, anicteric sclera  HEENT: NCAT  Pulmonary: clear to auscultation bilaterally  Cardiac: normal rate and regular rhythm, no murmurs, 2+DP pulses, 2+ radial pulses  Abdomen: soft, nontender, nondistended, bowel sounds present  MSK: Knee brace in place, Ace wrap with postoperative dressing right anterior knee, range of motion of the right lower extremity deferred due to operative repair of patellar fracture today  Neuro: Alert, answers questions appropriately  Skin: capillary refill brisk      Diagnostic Study Results     Labs -  No results found for this or any previous visit (from the past 12 hour(s)). Radiologic Studies -   DUPLEX LOWER EXT VENOUS RIGHT    (Results Pending)   -Negative DVT study with exclusion of the popliteal region due to postoperative dressing in place, venous patency noted proximal and distal with normal compression. Medical Decision Making   I am the first provider for this patient. I reviewed the vital signs, available nursing notes, past medical history, past surgical history, family history and social history. Vital Signs-Reviewed the patient's vital signs. EKG:     Records Reviewed: Nursing Notes (Time of Review: 4:26 PM)    ED Course: Progress Notes, Reevaluation, and Consults:         Provider Notes (Medical Decision Making):   MDM  Number of Diagnoses or Management Options  Leg swelling  Diagnosis management comments: 28-year-old female presents for evaluation of DVT study, she had a patellar fracture operatively repaired with orthopedic surgery today. Anesthesia was performing a nerve block, and reported superficial thrombosis noted on his ultrasound study. Patient was referred for evaluation of DVT with intention to start Xarelto therapy tomorrow.   Ultrasound was obtained of the right lower extremity which was negative for DVT, small area of occlusion of the popliteal region due to the patient's postoperative dressing. No sign of DVT on examination. Low suspicion at this point time for acute DVT, the orthopedic physician assistant saw the patient in the ED and reapplied the postoperative wrap and knee brace. Patient is to initiate rivaroxaban anticoagulation tomorrow as part of her postoperative course. Procedures    Critical Care Time: 0      Diagnosis     Clinical Impression:   1. Leg swelling        Disposition: discharge    Follow-up Information       Follow up With Specialties Details Why Contact Info    Marsha Vallejo MD Orthopedic Surgery Schedule an appointment as soon as possible for a visit in 1 week  [address] [place] [address] [address] [place] [address] [address]               Discharge Medication List as of 9/12/2022  4:46 PM        CONTINUE these medications which have NOT CHANGED    Details   doxycycline (MONODOX) 100 mg capsule Take 100 mg by mouth two (2) times a day., Historical Med      oxyCODONE-acetaminophen (Percocet) 5-325 mg per tablet Take 1 Tablet by mouth every four (4) hours as needed for Pain for up to 7 days. Max Daily Amount: 6 Tablets. Do not take until after surgery (for acute post operative pain), Normal, Disp-40 Tablet, R-0Attending DILLON Rouse [id number]      rivaroxaban (Xarelto) 10 mg tablet Take 1 Tablet by mouth daily (with breakfast). , Normal, Disp-14 Tablet, R-0      levothyroxine (SYNTHROID) 125 mcg tablet TAKE 1 TABLET BY MOUTH ONE TIME A DAY BEFORE BREAKFAST, Normal, Disp-90 Tablet, R-1      simvastatin (ZOCOR) 20 mg tablet Take 1 Tablet by mouth nightly., Normal, Disp-90 Tablet, R-1      loratadine (CLARITIN) 10 mg tablet Take 10 mg by mouth., Historical Med      cholecalciferol, vitamin D3, (VITAMIN D3 PO) Take  by mouth daily. , Historical Med      multivitamin (ONE A DAY) tablet Take 1 Tab by mouth daily.  Indications: VITAMIN DEFICIENCY PREVENTIONHistorical Med, 1 Tab Disclaimer: Sections of this note are dictated using utilizing voice recognition software. Minor typographical errors may be present. If questions arise, please do not hesitate to contact me or call our department.

## 2022-09-12 NOTE — ED TRIAGE NOTES
Pt broke her right knee 2 weeks ago. Had surgery today. When anesthesia was placeing a block, they noticed what appeared to be blood clots. Here to rule that out.  Has knee brace and ace bandage to right knee with ice pack

## 2022-09-13 ENCOUNTER — TELEPHONE (OUTPATIENT)
Dept: ORTHOPEDIC SURGERY | Age: 52
End: 2022-09-13

## 2022-09-13 DIAGNOSIS — S82.001A CLOSED DISPLACED FRACTURE OF RIGHT PATELLA, UNSPECIFIED FRACTURE MORPHOLOGY, INITIAL ENCOUNTER: Primary | ICD-10-CM

## 2022-09-13 RX ORDER — HYDROMORPHONE HYDROCHLORIDE 2 MG/1
2 TABLET ORAL
Qty: 40 TABLET | Refills: 0 | Status: SHIPPED | OUTPATIENT
Start: 2022-09-13 | End: 2022-09-20

## 2022-09-13 RX ORDER — GABAPENTIN 300 MG/1
300 CAPSULE ORAL 3 TIMES DAILY
Qty: 90 CAPSULE | Refills: 0 | Status: SHIPPED | OUTPATIENT
Start: 2022-09-13 | End: 2022-10-10 | Stop reason: SDUPTHER

## 2022-09-13 NOTE — TELEPHONE ENCOUNTER
Patient called again and said her nerve block has worn off. That as of 6:30 a.m. this morning her pain came back. That her blood pressures is up. Patient said she needs a call back as soon as possible. That she may need to go to the ER. Patient said she had sx done by Africa Sanchez yesterday and needs to speak to someone right away. Patient tel. [phone]. Note : please see previous message.

## 2022-09-13 NOTE — TELEPHONE ENCOUNTER
Patient called and said she had surgery done yesterday on her Right Knee by . Patient said that as of this morning, her pain has returned , that the Oxycodone medication is not helping. Patient is asking to speak with ANA Redd. [facility] [address] [facility]  Tel. [phone]. Patient tel. [phone].

## 2022-09-16 ENCOUNTER — HOSPITAL ENCOUNTER (OUTPATIENT)
Dept: PHYSICAL THERAPY | Age: 52
Discharge: HOME | End: 2022-09-16
Payer: COMMERCIAL

## 2022-09-16 PROCEDURE — 97162 PT EVAL MOD COMPLEX 30 MIN: CPT | Performed by: PHYSICAL THERAPIST

## 2022-09-16 PROCEDURE — 97530 THERAPEUTIC ACTIVITIES: CPT | Performed by: PHYSICAL THERAPIST

## 2022-09-16 PROCEDURE — 97110 THERAPEUTIC EXERCISES: CPT | Performed by: PHYSICAL THERAPIST

## 2022-09-16 NOTE — PROGRESS NOTES
PT DAILY TREATMENT NOTE     Patient Name: Markus Boggs  KWMA:  : 1970  [x]  Patient  Verified  [facility]or: BLUE CROSS / [facility] [address] / Product Type: PPO /    In time:8:19A  Out time:1030  Total Treatment Time (min): 41min  Visit #: 1 of 10    Medicare/BCBS Only   Total Timed Codes (min):  30 1:1 Treatment Time:  41       Treatment Area: Pain in right knee [M25.561]    SUBJECTIVE  Pain Level (0-10 scale): 10  Any medication changes, allergies to medications, adverse drug reactions, diagnosis change, or new procedure performed?: [x] No    [] Yes (see summary sheet for update)  Subjective functional status/changes:   [] No changes reported    Lives in 2 story home with 3 steps to enter with bilateral handrails, with , 14 steps to second floor with rails on both sides. Also has a sunken den without a step. OBJECTIVE    11 min [x]Eval                  []Re-Eval       16 min Therapeutic Exercise:  [] See flow sheet :   Rationale: increase ROM and increase strength to improve the patients ability to Tolerate basic ADLs and job-related tasks without pain. 15 min Therapeutic Activity:  [x]  See flow sheet :   Rationale: increase strength and improve coordination  to improve the patients ability to perform functional tasks such as floor reach. With   [x] TE   [x] TA   [] neuro   [] other: Patient Education: [x] Review HEP    [x] Progressed/Changed HEP based on:   [x] positioning   [] body mechanics   [] transfers   [] heat/ice application    [] other:      Other Objective/Functional Measures:     Right knee fixed at 30 degrees flexion in the brace with ace wrap applied     A/ROM Right ankle: 10 degrees DF, 45 degrees PF    Able to balance on the Left foot with moderate instability. Gait/balance: using 2 axilla crutches with Right NWB and swing through on the Right leg. Has to use UEs to get out of the chair.     Pain Level (0-10 scale) post treatment: 3/10    ASSESSMENT/Changes in Function: Patient is a pleasant middle aged adult female with Right knee pain s/p traumatic fall on pool deck concrete on 9/2/22 that resulted in Need for patella reconstructive ORIF on 9/12/22. Pain appears to be peripheral neuropathic in nature due to referred symptoms. Treatment will follow surgeon protocol closely. Will contact referring provided for specifics     Patient will continue to benefit from skilled PT services to modify and progress therapeutic interventions, address functional mobility deficits, address ROM deficits, address strength deficits, analyze and address soft tissue restrictions, analyze and cue movement patterns, analyze and modify body mechanics/ergonomics, assess and modify postural abnormalities, address imbalance/dizziness, and instruct in home and community integration to attain remaining goals.      [x]  See Plan of Care  []  See progress note/recertification  []  See Discharge Summary         Progress towards goals / Updated goals:  See POC    PLAN  [x]  Upgrade activities as tolerated     [x]  Continue plan of care  []  Update interventions per flow sheet       []  Discharge due to:_  []  Other:_      Jessica Cortez PT 9/16/2022  8:32 AM    Future Appointments   Date Time Provider Pratik Nagel   9/19/2022  1:30 PM ANA Dao [facility] BS AMB   7/14/2023  9:15 AM [facility] LAB VISIT [facility] BS AMB   7/21/2023  9:40 AM Estela Christianson MD [facility] BS AMB

## 2022-09-16 NOTE — PROGRESS NOTES
[facility] [address] [facility]  [address] [facility], [address] [facility], [address] [place], [place] [address]  [phone] fax      Plan of Care/ Statement of Necessity for Physical Therapy Services    Patient name: Pratik Ellington Start of Care: 2022   Referral source: Jessica Mcnamara, [address] : 1970    Medical Diagnosis: Pain in right knee [M25.561]  Payor: BLUE CROSS / Plan: [facility] REYES [facility] [address] / Product Type: PPO /  Onset Date:22    Treatment Diagnosis: Right knee pain   Prior Hospitalization: see medical history Provider#: [id number]   Medications: Verified on Patient summary List    Comorbidities: patient reports: thyroid problems, tobacco use   Prior Level of Function: I with ADLs, working desk job full time, playing with grandkids without difficulty. The Plan of Care and following information is based on the information from the initial evaluation. Assessment/ key information: Patient is a pleasant middle aged adult female with Right knee pain s/p traumatic fall on pool deck concrete on 22 that resulted in Need for patella reconstructive ORIF on 22. Pain appears to be peripheral neuropathic in nature due to referred symptoms. Treatment will follow surgeon protocol closely.  Will contact referring provided for specifics   Evaluation Complexity History MEDIUM  Complexity : 1-2 comorbidities / personal factors will impact the outcome/ POC ; Examination MEDIUM Complexity : 3 Standardized tests and measures addressing body structure, function, activity limitation and / or participation in recreation  ;Presentation LOW Complexity : Stable, uncomplicated  ;Clinical Decision Making MEDIUM Complexity : FOTO score of 26-74  Overall Complexity Rating: MEDIUM  Problem List: pain affecting function, decrease ROM, decrease strength, edema affecting function, impaired gait/ balance, decrease ADL/ functional abilitiies, decrease activity tolerance, decrease flexibility/ joint Received fax from [facility] pharmacy for patient request 90 day supply.   mobility, and decrease transfer abilities   Treatment Plan may include any combination of the following: Therapeutic exercise, Therapeutic activities, Neuromuscular re-education, Physical agent/modality, Gait/balance training, Manual therapy, Patient education, Self Care training, Functional mobility training, Home safety training, and Stair training  Patient / Family readiness to learn indicated by: asking questions, trying to perform skills, and interest  Persons(s) to be included in education: patient (P)  Barriers to Learning/Limitations: None  Patient Goal (s): I want to get back to normal  Patient Self Reported Health Status: good  Rehabilitation Potential: good    Short Term Goals: To be accomplished in 5 weeks:  1. Patient will be I with HEP for carryover to home management               Eval: established  2. Patient will be able to perform 5 sit to stands from a chair with arm rests in 11 secs or less to demonstrate increased independence with transfers. Eval: elevated surface, 30 secs  3. Patient will be able to achieve full extension and flexion as allowed per protocol. Eval: fixed at 30 degrees flexion with brace  4. Patient will be able to safely descend and ascend the stairs in upright position vs. Sitting to facilitate continued progress to prior level of function. Eval: scooting up the stairs on her bottom  Long Term Goals: To be accomplished in 10 treatments:  Patient will graduate to Partial weight bearing as able per protocol. Eval: NWB on the Right  2. Patient will be able ambulate (when able per protocol) with one crutch without loss of balance for 200ft without difficulty. Eval: standard rolling walker  Frequency / Duration: Patient to be seen 2-3 times per week for 6 weeks.     Patient/ Caregiver education and instruction: Diagnosis, prognosis, self care, activity modification, brace/ splint application, and exercises   [x]  Plan of care has been reviewed with [facility] [place] [facility] [place], PT 9/16/2022 8:32 AM  ________________________________________________________________________    I certify that the above Therapy Services are being furnished while the patient is under my care. I agree with the treatment plan and certify that this therapy is necessary.     [address] [place] [address] Signature:____________Date:_________TIME:________     ANA Mendez  ** Signature, Date and Time must be completed for valid certification **      Please sign and return to In [address] [place] [address] [address]  [address] [place] [address], [address] [place] [address], [id number] [place] [address],[facility] [address] [phone] fax

## 2022-09-19 ENCOUNTER — OFFICE VISIT (OUTPATIENT)
Dept: ORTHOPEDIC SURGERY | Age: 52
End: 2022-09-19
Payer: COMMERCIAL

## 2022-09-19 VITALS
RESPIRATION RATE: 16 BRPM | WEIGHT: 150 LBS | BODY MASS INDEX: 24.99 KG/M2 | OXYGEN SATURATION: 99 % | HEIGHT: 65 IN | TEMPERATURE: 98 F | HEART RATE: 73 BPM

## 2022-09-19 DIAGNOSIS — S82.001A CLOSED DISPLACED FRACTURE OF RIGHT PATELLA, UNSPECIFIED FRACTURE MORPHOLOGY, INITIAL ENCOUNTER: Primary | ICD-10-CM

## 2022-09-19 PROCEDURE — 99024 POSTOP FOLLOW-UP VISIT: CPT | Performed by: PHYSICIAN ASSISTANT

## 2022-09-19 PROCEDURE — 73560 X-RAY EXAM OF KNEE 1 OR 2: CPT | Performed by: PHYSICIAN ASSISTANT

## 2022-09-19 NOTE — PROGRESS NOTES
Markus Boggs  1970     HISTORY OF PRESENT ILLNESS  Markus Boggs is a 47 y.o. female who presents today for evaluation s/p ORIF right patella fracture on 2022. She is doing better. Off the diluadid. Patient pain is a 2/10. Patient denies any fever, chills, chest pain, shortness of breath or calf pain. The remainder of the review of systems is negative. There are no new illness or injuries to report since last seen in the office. No changes in medications, allergies, social or family history. PHYSICAL EXAM:   Visit Vitals  LMP 08/10/2013      The patient is a well-developed, well-nourished female in no acute distress. The patient is alert and oriented times three. The patient appears to be well groomed. Mood and affect are normal.  ORTHOPEDIC EXAM of right:  Inspection:  bruising present, effusion present, no edema  Incision, clean, dry, intact, sutures in place  Range of motion: 5-40  ttp incisional  Stability: Stable  Strength: n/a/5    Imagin view xray images of right knee taken in office  on 2022 read and reviewed by myself reveal reduced right patella fracture with hardware in excellent position. IMPRESSION:      ICD-10-CM ICD-9-CM    1. Closed displaced fracture of right patella, unspecified fracture morphology, initial encounter  S82.001A 822.0            PLAN: Patient improving postoperatively. We will continue with gentle range of motion and physical therapy. She will continue being nonweightbearing. We will increase her range of motion 10 degrees every week. Return to care 2 weeks for repeat x-rays.       Geovanni Lemus PA-C  [phone]

## 2022-09-20 ENCOUNTER — DOCUMENTATION ONLY (OUTPATIENT)
Dept: ORTHOPEDIC SURGERY | Age: 52
End: 2022-09-20

## 2022-09-20 NOTE — PROGRESS NOTES
Patient dropped off FMLA paperwork. It was scanned into the chart and placed in the forms bin at [facility] on 9/20/22 Was informed of 14 day policy.

## 2022-09-21 ENCOUNTER — DOCUMENTATION ONLY (OUTPATIENT)
Dept: ORTHOPEDIC SURGERY | Age: 52
End: 2022-09-21

## 2022-09-22 ENCOUNTER — HOSPITAL ENCOUNTER (OUTPATIENT)
Dept: PHYSICAL THERAPY | Age: 52
Discharge: HOME | End: 2022-09-22
Payer: COMMERCIAL

## 2022-09-22 PROCEDURE — 97110 THERAPEUTIC EXERCISES: CPT

## 2022-09-22 PROCEDURE — 97140 MANUAL THERAPY 1/> REGIONS: CPT

## 2022-09-22 PROCEDURE — 97535 SELF CARE MNGMENT TRAINING: CPT

## 2022-09-22 NOTE — PROGRESS NOTES
PT DAILY TREATMENT NOTE     Patient Name: Zana Monreal  Date:2022  : 1970  [x]  Patient  Verified  Payor: Nazario Sandy / Plan: [facility] [employer] [address] / Product Type: PPO /    In time:347 pm   Out time:430 pm   Total Treatment Time (min): 43  Visit #: 2 of 18    Medicare/BCBS Only   Total Timed Codes (min):  43 1:1 Treatment Time:  43       Treatment Area: Pain in right knee [M25.561]    SUBJECTIVE  Pain Level (0-10 scale): 2/10   Any medication changes, allergies to medications, adverse drug reactions, diagnosis change, or new procedure performed?: [x] No    [] Yes (see summary sheet for update)  Subjective functional status/changes:   [] No changes reported  Patient reports having some soreness in her knee. \"I'm good though. It's not anything that I can't handle. \"    OBJECTIVE    Modality rationale: Patient declined modalities today and stated that she would ice at home.    Min Type Additional Details    [] Estim:  []Unatt       []IFC  []Premod                        []Other:  []w/ice   []w/heat  Position:  Location:    [] Estim: []Att    []TENS instruct  []NMES                    []Other:  []w/US   []w/ice   []w/heat  Position:  Location:    []  Traction: [] Cervical       []Lumbar                       [] Prone          []Supine                       []Intermittent   []Continuous Lbs:  [] before manual  [] after manual    []  Ultrasound: []Continuous   [] Pulsed                           []1MHz   []3MHz W/cm2:  Location:    []  Iontophoresis with dexamethasone         Location: [] Take home patch   [] In clinic    []  Ice     []  heat  []  Ice massage  []  Laser   []  Anodyne Position:  Location:    []  Laser with stim  []  Other:  Position:  Location:    []  Vasopneumatic Device    []  Right     []  Left  Pre-treatment girth:  Post-treatment girth:  Measured at (location):  Pressure:       [] lo [] med [] hi   Temperature: [] lo [] med [] hi   [] Skin assessment post-treatment:  []intact []redness- no adverse reaction []redness - adverse reaction:     21 min Therapeutic Exercise:  [] See flow sheet :   Rationale: increase ROM and increase strength to improve the patient overall muscle endurance and activity tolerance for ADL's and functional tasks. min Therapeutic Activity:  []  See flow sheet :   Rationale: increase strength and improve coordination  to improve the patients ability to safely perform dynamic activities and to improve functional performance of ADL's       min Neuromuscular Re-education:  []  See flow sheet :   Rationale: increase strength, improve coordination, and improve balance  to improve the patients ability to perform activities with good form, stability and proprioception. 10 min Manual Therapy:  STM/Effleurage to right LE and gastroc. The manual therapy interventions were performed at a separate and distinct time from the therapeutic activities interventions. Rationale: decrease pain, increase ROM, increase tissue extensibility, and decrease trigger points to assist with performing with ADL's with ease. 12 min Self Care/Home Management: Review HEP   Rationale: increase ROM, increase strength, and improve coordination  to improve the patients ability to safely and effectively perform HEP at home. With   [] TE   [] TA   [] neuro   [] other: Patient Education: [x] Review HEP    [] Progressed/Changed HEP based on:   [] positioning   [] body mechanics   [] transfers   [] heat/ice application    [] other:      Other Objective/Functional Measures:   Initiated exercises set a initial evaluation. Verbal cues required for proper form. Surgical site is healing well. No sign of infection    Pain Level (0-10 scale) post treatment: 5-6/10     ASSESSMENT/Changes in Function:   Patient is very motivated for PT and is progressing as expected at this time.  Patient performed exercises, as per flow sheet, to assist with increasing right quad strength. Patient is complaint with NWB status. Patient experienced increase right knee pain with SLR today. SLR were held today due to that. Patient tolerated all other exercises well. Right LE swelling decreased following manual therapy. Reviewed HEP with patient. Increased in right knee pain was experienced after today's session. Will continue to progress patient as tolerated and as per protocol. Patient will continue to benefit from skilled PT services to modify and progress therapeutic interventions, address functional mobility deficits, address ROM deficits, address strength deficits, analyze and address soft tissue restrictions, analyze and cue movement patterns, analyze and modify body mechanics/ergonomics, assess and modify postural abnormalities, address imbalance/dizziness, and instruct in home and community integration to attain remaining goals. []  See Plan of Care  []  See progress note/recertification  []  See Discharge Summary         Progress towards goals / Updated goals:  1. Patient will be I with HEP for carryover to home management               Eval: established  Current: Patient is complaint with HEP twice a day. 9/22/2022  2. Patient will be able to perform 5 sit to stands from a chair with arm rests in 11 secs or less to demonstrate increased independence with transfers. Eval: elevated surface, 30 secs  3. Patient will be able to achieve full extension and flexion as allowed per protocol. Eval: fixed at 30 degrees flexion with brace  Current: Fixed at 60 degrees. 9/22/2022  4. Patient will be able to safely descend and ascend the stairs in upright position vs. Sitting to facilitate continued progress to prior level of function. Eval: scooting up the stairs on her bottom  Long Term Goals: To be accomplished in 10 treatments:  Patient will graduate to Partial weight bearing as able per protocol.                 Eval: NWB on the Right  Current: Patient is complaint with NWB status. 9/22/2022  2. Patient will be able ambulate (when able per protocol) with one crutch without loss of balance for 200ft without difficulty. Eval: standard rolling walker   Current: Patient is still ambulating with RW and brace.  9/22/2022    PLAN  [x]  Upgrade activities as tolerated     [x]  Continue plan of care  []  Update interventions per flow sheet       []  Discharge due to:_  []  Other:_      Charis Slaughter, SOSA 9/22/2022  9:34 AM    Future Appointments   Date Time Provider Pratik Nagel   9/22/2022  3:45 PM Marylene Edison, PTA [facility]   9/26/2022  3:00 PM Tyler Clearbrook, DPT [facility]   9/29/2022  3:45 PM Sherita Carmen, PTA [facility]   10/3/2022  1:30 PM ANA Arriaga [facility] BS AMB   10/4/2022  3:45 PM Tyler Clearbrook, DPT [facility]   10/6/2022  3:45 PM Marylene Edison, PTA [facility]   10/11/2022  3:45 PM Eleni Sarkar, PTA [facility]   10/13/2022  3:45 PM Tyler Clearbrook, DPT [facility]   10/18/2022  3:45 PM Tyler Clearbrook, DPT [facility]   10/20/2022  3:45 PM Marylene Edison, PTA [facility]   10/25/2022  3:45 PM Tyler Clearbrook, DPT [facility]   10/27/2022  3:45 PM Marylene Edison, PTA [facility]   7/14/2023  9:15 AM [facility] LAB VISIT [facility] AMB   7/21/2023  9:40 AM Mar Fisher MD [facility] AMB

## 2022-09-26 ENCOUNTER — HOSPITAL ENCOUNTER (OUTPATIENT)
Dept: PHYSICAL THERAPY | Age: 52
Discharge: HOME | End: 2022-09-26
Payer: COMMERCIAL

## 2022-09-26 PROCEDURE — 97112 NEUROMUSCULAR REEDUCATION: CPT

## 2022-09-26 PROCEDURE — 97535 SELF CARE MNGMENT TRAINING: CPT

## 2022-09-26 PROCEDURE — 97110 THERAPEUTIC EXERCISES: CPT

## 2022-09-26 NOTE — PROGRESS NOTES
PT DAILY TREATMENT NOTE     Patient Name: Willie Woodson  Date:2022  : 1970  [x]  Patient  Verified  Payor: [employer] / [facility] [address] / Product Type: PPO /    In time:3:04P  Out time:3:59P   Total Treatment Time (min): 54  Visit #: 3 of 18    Medicare/BCBS Only   Total Timed Codes (min):  55 1:1 Treatment Time:  54       Treatment Area: Pain in right knee [M25.561]    SUBJECTIVE  Pain Level (0-10 scale): 4  Any medication changes, allergies to medications, adverse drug reactions, diagnosis change, or new procedure performed?: [x] No    [] Yes (see summary sheet for update)  Subjective functional status/changes:   [] No changes reported  Patient reports experiencing a little more pain/discomfort at lateral knee today, further correlating symptoms to being at hospital today for father undergoing surg. Reports full compliance with prescribed HEP. OBJECTIVE    25 min Therapeutic Exercise:  [x] See flow sheet : PROM of right knee    Rationale: increase ROM and increase strength to improve the patient overall muscle endurance and activity tolerance for ADL's and functional tasks.       16 min Neuromuscular Re-education:  [x]  See flow sheet :   Rationale: increase strength, improve coordination, and increase proprioception  to improve the patients ability to perform ADL/IADLs with greater ease     14 min Self Care/Home Management: Pt ed on strategies to perform HEP more comfortably (w/ progression of HEP per print out - added heel slides w/ brace locked to 70 deg and glute sets), in addition to effective transfer/bed mobility strategies (reviewed with pt and pt's ) - hooking left LE under right ankle - pt and pt's  verbally and physically demonstrate knowledge and understanding of this    Rationale:  increase pt ed/awareness  to improve the patients ability to safely and effectively perform HEP, transfer in/out of bed and perform ADLs in order to minimize risk of reinjury/delay healing           With   [x] TE   [] TA   [x] neuro   [] other: Patient Education: [x] Review HEP    [] Progressed/Changed HEP based on:   [] positioning   [] body mechanics   [] transfers   [] heat/ice application    [x] other: updated HEP w/ new print out issued - see self care management     Other Objective/Functional Measures:   Brace set to 70 deg (per MD instruction of increasing 10 deg per week)   Progressed ex program per MD instruction - wore brace     Pain Level (0-10 scale) post treatment: 5     ASSESSMENT/Changes in Function:   Progressed ex program and brace setting per MD instruction. Pt extremely hesitant requiring frequent verbal cuing/encouragement, however tolerates all activities well (including pt ed noted above) w/o significant symptom flare up. Will continue to progress activity program as able and tolerated, following MD orders. Patient will continue to benefit from skilled PT services to modify and progress therapeutic interventions, address functional mobility deficits, address ROM deficits, address strength deficits, analyze and address soft tissue restrictions, analyze and cue movement patterns, analyze and modify body mechanics/ergonomics, assess and modify postural abnormalities, address imbalance/dizziness, and instruct in home and community integration to attain remaining goals. [x]  See Plan of Care  []  See progress note/recertification  []  See Discharge Summary         Progress towards goals / Updated goals:  1. Patient will be I with HEP for carryover to home management               Eval: established  Current: Patient is complaint with HEP twice a day. 9/22/2022    2. Patient will be able to perform 5 sit to stands from a chair with arm rests in 11 secs or less to demonstrate increased independence with transfers. Eval: elevated surface, 30 secs    3.  Patient will be able to achieve full extension and flexion as allowed per protocol. Eval: fixed at 30 degrees flexion with brace  Current: Fixed at 70 degrees. 9/26/2022    4. Patient will be able to safely descend and ascend the stairs in upright position vs. Sitting to facilitate continued progress to prior level of function. Eval: scooting up the stairs on her bottom  Long Term Goals: To be accomplished in 10 treatments:  Patient will graduate to Partial weight bearing as able per protocol. Eval: NWB on the Right  Current: Patient is complaint with NWB status. 9/26/2022    2. Patient will be able ambulate (when able per protocol) with one crutch without loss of balance for 200ft without difficulty. Eval: standard rolling walker   Current: Patient is still ambulating with RW and brace.  9/22/2022    PLAN  []  Upgrade activities as tolerated     [x]  Continue plan of care  []  Update interventions per flow sheet       []  Discharge due to:_  []  Other:_      Eloise Wilkerson DPT 9/26/2022  9:34 AM    Future Appointments   Date Time Provider Pratik Nagel   9/26/2022  3:00 PM Eber Nolasco, DPT [facility]   9/29/2022  3:45 PM Sherita Carmen, PTA [facility]   10/3/2022  1:30 PM ANA Campbell [facility] AMB   10/4/2022  3:45 PM Eber Nolasco, DPT [facility]   10/6/2022  3:45 PM Shell Alvarez, PTA [facility]   10/11/2022  3:45 PM Riley Correa PTA [facility]   10/13/2022  3:45 PM Debsenait Nolasco, DPT [facility]   10/18/2022  3:45 PM Debsenait Nolasco, DPT [facility]   10/20/2022  3:45 PM Shell Alvarez, PTA [facility]   10/25/2022  3:45 PM Eber Nolasco DPT [facility]   10/27/2022  3:45 PM Shell Alvarez PTA [facility]   7/14/2023  9:15 AM [facility] LAB VISIT [facility] AMB   7/21/2023  9:40 AM Aguila Maldonado MD [facility] BS AMB

## 2022-09-27 ENCOUNTER — PATIENT MESSAGE (OUTPATIENT)
Dept: ORTHOPEDIC SURGERY | Age: 52
End: 2022-09-27

## 2022-09-27 NOTE — TELEPHONE ENCOUNTER
From: Jose Null  To: ANA Dumont  Sent: 9/27/2022 2:01 PM EDT  Subject: lotion    Hello,  When do I start rubbing the scar/scab area with lotion?   thank you,  Connie Urias

## 2022-09-28 ENCOUNTER — PATIENT MESSAGE (OUTPATIENT)
Dept: ORTHOPEDIC SURGERY | Age: 52
End: 2022-09-28

## 2022-09-28 NOTE — TELEPHONE ENCOUNTER
From: Stefany Collado  To: ANA Sunshine  Sent: 9/28/2022 11:54 AM EDT  Subject: Lawerance Pretty,  I was informed they received the paper work for me to return however it was marked with restrictions but doesnt include the restrictions. Could you please specify the restrictions so that i may return to work?   thank you,  Arturo Travis

## 2022-09-29 ENCOUNTER — HOSPITAL ENCOUNTER (OUTPATIENT)
Dept: PHYSICAL THERAPY | Age: 52
Discharge: HOME | End: 2022-09-29
Payer: COMMERCIAL

## 2022-09-29 PROCEDURE — 97535 SELF CARE MNGMENT TRAINING: CPT

## 2022-09-29 PROCEDURE — 97140 MANUAL THERAPY 1/> REGIONS: CPT

## 2022-09-29 PROCEDURE — 97110 THERAPEUTIC EXERCISES: CPT

## 2022-09-29 NOTE — PROGRESS NOTES
PT DAILY TREATMENT NOTE     Patient Name: Alvin Stokes  Date:2022  : 1970  [x]  Patient  Verified  Payor: [employer] / Plan: [facility] [employer] [address] / Product Type: PPO /    In time:3:46  Out time:4:35  Total Treatment Time (min): 49  Visit #: 4 of 18    Medicare/BCBS Only   Total Timed Codes (min):  39 1:1 Treatment Time:  39       Treatment Area: Pain in right knee [M25.561]    SUBJECTIVE  Pain Level (0-10 scale): 3  Any medication changes, allergies to medications, adverse drug reactions, diagnosis change, or new procedure performed?: [x] No    [] Yes (see summary sheet for update)  Subjective functional status/changes:   [] No changes reported  \"Pt reported she over done it with HEP and this morning she was sore and numbness. \"    OBJECTIVE    Modality rationale: decrease pain and increase tissue extensibility to improve the patients ability to perform ADL    Min Type Additional Details    [] Estim:  []Unatt       []IFC  []Premod                        []Other:  []w/ice   []w/heat  Position:  Location:    [] Estim: []Att    []TENS instruct  []NMES                    []Other:  []w/US   []w/ice   []w/heat  Position:  Location:    []  Traction: [] Cervical       []Lumbar                       [] Prone          []Supine                       []Intermittent   []Continuous Lbs:  [] before manual  [] after manual    []  Ultrasound: []Continuous   [] Pulsed                           []1MHz   []3MHz W/cm2:  Location:    []  Iontophoresis with dexamethasone         Location: [] Take home patch   [] In clinic   10 [x]  Ice     []  heat  []  Ice massage  []  Laser   []  Anodyne Position:long sit  Location:right knee    []  Laser with stim  []  Other:  Position:  Location:    []  Vasopneumatic Device    []  Right     []  Left  Pre-treatment girth:  Post-treatment girth:  Measured at (location):  Pressure:       [] lo [] med [] hi   Temperature: [] lo [] med [] hi   [x] Skin assessment post-treatment:  [x]intact []redness- no adverse reaction    []redness - adverse reaction:      min []Eval                  []Re-Eval       19 min Therapeutic Exercise:  [x] See flow sheet :   Rationale: increase ROM and increase strength to improve the patients ability to perform daily activties     min Therapeutic Activity:  [x]  See flow sheet :   Rationale: increase ROM, increase strength, and improve coordination  to improve the patients ability to ascend/descend stairs with no difficulty      min Neuromuscular Re-education:  []  See flow sheet :   Rationale: increase ROM, increase strength, improve coordination, improve balance, and increase proprioception  to improve the patients ability to ambulate with no LOB or gait deviations    12 min Manual Therapy:  effleruage/STM  gentle PROM right within protocol supine    The manual therapy interventions were performed at a separate and distinct time from the therapeutic activities interventions.   Rationale: decrease pain, increase ROM, increase tissue extensibility, decrease edema , decrease trigger points, and increase postural awareness to perform ADL      min Gait Training:  ___ feet with ___ device on level surfaces with ___ level of assist   Rationale:    8 min Self Care/Home Management: therapist discussed with pt on not over doing HEP which may make it worst than better and purpose of using CP following HEP   Rationale: increase ROM, increase strength, and improve coordination  to improve the patients ability to perform daily chores          With   [x] TE   [x] TA   [] neuro   [] other: Patient Education: [x] Review HEP    [] Progressed/Changed HEP based on:   [] positioning   [] body mechanics   [] transfers   [] heat/ice application    [] other:      Other Objective/Functional Measures:      Pain Level (0-10 scale) post treatment: 5    ASSESSMENT/Changes in Function: Pt became emotional when therapist asked pt on performing SLR to comfortable range with therapist assistance. Therapist held there ex for today. Overall pt completed remaining strengthening there ex well with min/mod pain at lateral aspect of right knee. Pt stated that she over done it on last night because she felt like she was doing great. Pt continued with mod residual pain but felt better. Patient will continue to benefit from skilled PT services to modify and progress therapeutic interventions, address functional mobility deficits, address ROM deficits, address strength deficits, analyze and address soft tissue restrictions, analyze and cue movement patterns, analyze and modify body mechanics/ergonomics, assess and modify postural abnormalities, and instruct in home and community integration to attain remaining goals. [x]  See Plan of Care  []  See progress note/recertification  []  See Discharge Summary         Progress towards goals / Updated goals:  1. Patient will be I with HEP for carryover to home management               Eval: established  Current: Patient is complaint with HEP twice a day. 9/29/2022     2. Patient will be able to perform 5 sit to stands from a chair with arm rests in 11 secs or less to demonstrate increased independence with transfers. Eval: elevated surface, 30 secs     3. Patient will be able to achieve full extension and flexion as allowed per protocol. Eval: fixed at 30 degrees flexion with brace  Current: Fixed at 70 degrees. 9/26/2022     4. Patient will be able to safely descend and ascend the stairs in upright position vs. Sitting to facilitate continued progress to prior level of function. Eval: scooting up the stairs on her bottom  Long Term Goals: To be accomplished in 10 treatments:  Patient will graduate to Partial weight bearing as able per protocol. Eval: NWB on the Right  Current: Patient is complaint with NWB status. 9/26/2022     2.  Patient will be able ambulate (when able per protocol) with one crutch without loss of balance for 200ft without difficulty. Eval: standard rolling walker              Current: Patient is still ambulating with RW and brace.  9/22/2022    PLAN  []  Upgrade activities as tolerated     [x]  Continue plan of care  []  Update interventions per flow sheet       []  Discharge due to:_  []  Other:_      Crystal Kermit, PTA 9/29/2022  4:03 PM    Future Appointments   Date Time Provider Pratik Nagel   10/3/2022  1:30 PM ANA Zambrano [facility] BS AMB   10/4/2022  3:45 PM Romeo Carrier, DPT [facility]   10/6/2022  3:45 PM Omie Books, PTA [facility]   10/11/2022  3:45 PM Elizabeth Joyce, PTA [facility]   10/13/2022  3:45 PM Romeo Carrier, DPT [facility]   10/18/2022  3:45 PM Romeo Carrier, DPT [facility]   10/20/2022  3:45 PM Omie Books, PTA [facility]   10/25/2022  3:45 PM Romeo Carrier, DPT [facility]   10/27/2022  3:45 PM Omie Books, PTA [facility]   7/14/2023  9:15 AM [facility] LAB VISIT [facility] BS AMB   7/21/2023  9:40 AM Sandrita Ortiz MD [facility] BS AMB

## 2022-10-03 ENCOUNTER — OFFICE VISIT (OUTPATIENT)
Dept: ORTHOPEDIC SURGERY | Age: 52
End: 2022-10-03
Payer: COMMERCIAL

## 2022-10-03 ENCOUNTER — TELEPHONE (OUTPATIENT)
Dept: PHYSICAL THERAPY | Age: 52
End: 2022-10-03

## 2022-10-03 VITALS — BODY MASS INDEX: 25.71 KG/M2 | HEIGHT: 66 IN | WEIGHT: 160 LBS

## 2022-10-03 DIAGNOSIS — S82.001A CLOSED DISPLACED FRACTURE OF RIGHT PATELLA, UNSPECIFIED FRACTURE MORPHOLOGY, INITIAL ENCOUNTER: Primary | ICD-10-CM

## 2022-10-03 PROCEDURE — 99024 POSTOP FOLLOW-UP VISIT: CPT | Performed by: PHYSICIAN ASSISTANT

## 2022-10-03 PROCEDURE — 73560 X-RAY EXAM OF KNEE 1 OR 2: CPT | Performed by: PHYSICIAN ASSISTANT

## 2022-10-03 RX ORDER — DOXYCYCLINE 100 MG/1
CAPSULE ORAL
Qty: 180 CAPSULE | Refills: 2 | Status: SHIPPED | OUTPATIENT
Start: 2022-10-03

## 2022-10-03 NOTE — PROGRESS NOTES
Salma Chong  1970     HISTORY OF PRESENT ILLNESS  Salma Chong is a 47 y.o. female who presents today for evaluation s/p ORIF right patella fracture on 2022. She is doing better. Patient pain is a 0/10. Patient denies any fever, chills, chest pain, shortness of breath or calf pain. The remainder of the review of systems is negative. There are no new illness or injuries to report since last seen in the office. No changes in medications, allergies, social or family history. PHYSICAL EXAM:   Visit Vitals  LMP 08/10/2013      The patient is a well-developed, well-nourished female in no acute distress. The patient is alert and oriented times three. The patient appears to be well groomed. Mood and affect are normal.  ORTHOPEDIC EXAM of right:  Inspection:  bruising present, effusion present, no edema  Incision well healed  Range of motion: 5-40  ttp incisional  Stability: Stable  Strength: n/a/5    Imagin view xray images of right knee taken in office  on 10/3/2022 read and reviewed by myself reveal reduced right patella fracture with hardware in excellent position. IMPRESSION:      ICD-10-CM ICD-9-CM    1. Closed displaced fracture of right patella, unspecified fracture morphology, initial encounter  S82.001A 822.0            PLAN: Patient improving postoperatively. We will continue with gentle range of motion and physical therapy. She will continue being nonweightbearing. We will open her brace for full rom at this time    Return to care 2 weeks for repeat x-rays.       [facility]

## 2022-10-04 ENCOUNTER — HOSPITAL ENCOUNTER (OUTPATIENT)
Dept: PHYSICAL THERAPY | Age: 52
Discharge: HOME | End: 2022-10-04
Payer: COMMERCIAL

## 2022-10-04 ENCOUNTER — DOCUMENTATION ONLY (OUTPATIENT)
Dept: ORTHOPEDIC SURGERY | Age: 52
End: 2022-10-04

## 2022-10-04 PROCEDURE — 97535 SELF CARE MNGMENT TRAINING: CPT

## 2022-10-04 PROCEDURE — 97112 NEUROMUSCULAR REEDUCATION: CPT

## 2022-10-04 PROCEDURE — 97110 THERAPEUTIC EXERCISES: CPT

## 2022-10-04 NOTE — PROGRESS NOTES
PT DAILY TREATMENT NOTE     Patient Name: Kymberly Reyes  JZNA:  : 1970  [x]  Patient  Verified  Pay[facility]: [employer] / [facility] [address] / Product Type: PPO /    In time:3:46P  Out time:4:55P   Total Treatment Time (min): 71  Visit #: 5 of 18    Medicare/BCBS Only   Total Timed Codes (min):  69 1:1 Treatment Time:  69       Treatment Area: Pain in right knee [M25.561]    SUBJECTIVE  Pain Level (0-10 scale): 3  Any medication changes, allergies to medications, adverse drug reactions, diagnosis change, or new procedure performed?: [x] No    [] Yes (see summary sheet for update)  Subjective functional status/changes:   [] No changes reported  Patient reports experiencing increased right knee bruising and discomfort following previous session. States feeling fine now with these symptoms resolving upon arrival of today's session. Had MD follow up appt yesterday, in which pt is remain NWB x2 more wks. Also, brace unlocked at this time. OBJECTIVE    30 min Therapeutic Exercise:  [x] See flow sheet : PROM of right knee    Rationale: increase ROM and increase strength to improve the patient overall muscle endurance and activity tolerance for ADL's and functional tasks.       29 min Neuromuscular Re-education:  [x]  See flow sheet :   Rationale: increase strength, improve coordination, and increase proprioception  to improve the patients ability to perform ADL/IADLs with greater ease     10 min Self Care/Home Management: Continued pt ed on strategies to perform HEP more comfortably, review of updated HEP/protocol/WB status w/ expectations of recovery time w/ POC moving fwd upon future MD clearance, activity/positional modifications to implement in order to increase safety and ease performing transfers, ambulation and bed mobility tasks - pt and pt's  verbally and physically demonstrate knowledge and understanding of this    Rationale:  increase pt ed/awareness  to improve the patients ability to safely and effectively perform HEP, transfer in/out of bed and perform ADLs in order to minimize risk of reinjury/delay healing           With   [x] TE   [] TA   [x] neuro   [] other: self care management Patient Education: [x] Review HEP    [] Progressed/Changed HEP based on:   [] positioning   [] body mechanics   [] transfers   [] heat/ice application    [x] other: updated HEP w/ new print out issued - see self care management     Other Objective/Functional Measures:   Progressed ex program per MD instruction - added SLR (w/ therapist assistance), supine hip abd (w/ therapist assistance), foot intrinsic muscle activation activities, gastroc stretch w/ stretch     Pain Level (0-10 scale) post treatment: 3    ASSESSMENT/Changes in Function:   Progressed ex program following MD instruction/order. Pt hesitation gradually reducing requiring less verbal cuing/encouragement in comparison to previous sessions. Upon performing ex progressions noted above, pt requires therapist assistance/tactile cuing initially, however able to improve performance w/ appropriate quad/glute activation prior to movement performed. Pt w/ overall good tolerance to session demonstrating ability to complete w/o an increase in pain levels. Will continue to progress activity program as able and tolerated, following MD orders. Patient will continue to benefit from skilled PT services to modify and progress therapeutic interventions, address functional mobility deficits, address ROM deficits, address strength deficits, analyze and address soft tissue restrictions, analyze and cue movement patterns, analyze and modify body mechanics/ergonomics, assess and modify postural abnormalities, address imbalance/dizziness, and instruct in home and community integration to attain remaining goals.      [x]  See Plan of Care  []  See progress note/recertification  []  See Discharge Summary         Progress towards goals / Updated goals:  1. Patient will be I with HEP for carryover to home management               Eval: established  Current: Patient is complaint with HEP twice a day. 9/22/2022    2. Patient will be able to perform 5 sit to stands from a chair with arm rests in 11 secs or less to demonstrate increased independence with transfers. Eval: elevated surface, 30 secs    3. Patient will be able to achieve full extension and flexion as allowed per protocol. Eval: fixed at 30 degrees flexion with brace  Current: Fixed at 70 degrees. 9/26/2022    4. Patient will be able to safely descend and ascend the stairs in upright position vs. Sitting to facilitate continued progress to prior level of function. Eval: scooting up the stairs on her bottom    Long Term Goals: To be accomplished in 10 treatments:  Patient will graduate to Partial weight bearing as able per protocol. Eval: NWB on the Right  Current: Patient is complaint with NWB status and will remain NWB x2 more wks per MD instruction  10/04/2022      2. Patient will be able ambulate (when able per protocol) with one crutch without loss of balance for 200ft without difficulty. Eval: standard rolling walker   Current: Patient is still ambulating with RW and brace.  9/22/2022    PLAN  []  Upgrade activities as tolerated     [x]  Continue plan of care  []  Update interventions per flow sheet       []  Discharge due to:_  []  Other:_      Mague Metz DPT 10/4/2022  9:34 AM    Future Appointments   Date Time Provider Pratik Nagel   10/6/2022  3:45 PM Danni Gordon PTA [facility]   10/11/2022  3:45 PM Dheeraj Mackenzie [facility]   10/13/2022  3:45 PM Mara Barragan DPT [facility]   10/18/2022  3:45 PM Mara Barragan DPT [facility]   10/20/2022  3:45 PM Danni Gordon PTA [facility]   10/25/2022  3:45 PM Wynelle Barragan, DPT [facility]   10/27/2022  3:45 PM Danni Gordon, PTA [facility]   7/14/2023  9:15 AM [facility] LAB VISIT [facility] AMB   7/21/2023  9:40 AM Richelle Feliz MD [facility] BS AMB

## 2022-10-06 ENCOUNTER — APPOINTMENT (OUTPATIENT)
Dept: PHYSICAL THERAPY | Age: 52
End: 2022-10-06
Payer: COMMERCIAL

## 2022-10-10 DIAGNOSIS — S82.001A CLOSED DISPLACED FRACTURE OF RIGHT PATELLA, UNSPECIFIED FRACTURE MORPHOLOGY, INITIAL ENCOUNTER: ICD-10-CM

## 2022-10-10 DIAGNOSIS — E78.00 PURE HYPERCHOLESTEROLEMIA: ICD-10-CM

## 2022-10-10 RX ORDER — SIMVASTATIN 20 MG/1
20 TABLET, FILM COATED ORAL
Qty: 90 TABLET | Refills: 2 | Status: SHIPPED | OUTPATIENT
Start: 2022-10-10

## 2022-10-10 RX ORDER — GABAPENTIN 300 MG/1
300 CAPSULE ORAL 3 TIMES DAILY
Qty: 90 CAPSULE | Refills: 0 | Status: SHIPPED | OUTPATIENT
Start: 2022-10-10

## 2022-10-10 NOTE — TELEPHONE ENCOUNTER
Last Visit: 7/15/22 with MD Shavonne Jesus  Next Appointment: 7/21/23 with MD Shavonne Jesus  Previous Refill Encounter(s): 4/27/22 #90 with 1 refill    Requested Prescriptions     Pending Prescriptions Disp Refills    simvastatin (ZOCOR) 20 mg tablet 90 Tablet 2     Sig: Take 1 Tablet by mouth nightly. For [address] [facility] in place:   Recommendation Provided To:    Intervention Detail: New Rx: 1, reason: Patient Preference  Gap Closed?:   Intervention Accepted By:   Time Spent (min): 5

## 2022-10-11 ENCOUNTER — HOSPITAL ENCOUNTER (OUTPATIENT)
Dept: PHYSICAL THERAPY | Age: 52
Discharge: HOME | End: 2022-10-11
Payer: COMMERCIAL

## 2022-10-11 PROCEDURE — 97535 SELF CARE MNGMENT TRAINING: CPT

## 2022-10-11 PROCEDURE — 97110 THERAPEUTIC EXERCISES: CPT

## 2022-10-11 PROCEDURE — 97112 NEUROMUSCULAR REEDUCATION: CPT

## 2022-10-11 NOTE — PROGRESS NOTES
PT DAILY TREATMENT NOTE     Patient Name: Samantha Pettit  FLEJ:  : 1970  [x]  Patient  Verified  Payor: BLUE CROSS / Plan: [facility] REYES BCBS [address] / Product Type: PPO /    In time:5:15P  Out time:6:24P   Total Treatment Time (min): 71  Visit #: 6 of 18    Medicare/BCBS Only   Total Timed Codes (min):  69 1:1 Treatment Time:  69       Treatment Area: Pain in right knee [M25.561]    SUBJECTIVE  Pain Level (0-10 scale): 4  Any medication changes, allergies to medications, adverse drug reactions, diagnosis change, or new procedure performed?: [x] No    [] Yes (see summary sheet for update)  Subjective functional status/changes:   [] No changes reported  Patient reports overall doing well w/ no new major concerns following previous session. Reports having busy weekend including going to dinner and [employer] baseball games, further correlating the increase in activity to possible slight discomfort in right knee pain she is experiencing upon arrival to today's session. States discomfort is localized to two spots (points to medial and lateral lower aspects of knee joint), in addition to area around scar being very sensitive. Initially confirms continues to wear brace while sleeping (later in session reports not wearing while sleeping at this time)    OBJECTIVE    30 min Therapeutic Exercise:  [x] See flow sheet : PROM of right knee    Rationale: increase ROM and increase strength to improve the patient overall muscle endurance and activity tolerance for ADL's and functional tasks.       24 min Neuromuscular Re-education:  [x]  See flow sheet :   Rationale: increase strength, improve coordination, and increase proprioception  to improve the patients ability to perform ADL/IADLs with greater ease     15 min Self Care/Home Management: Pt ed desensitization techniques, protocol/WB status w/ expectations of recovery time w/ POC moving fwd upon future MD clearance, activity/positional modifications to implement in order to increase safety and ease performing transfers, ambulation and bed mobility tasks, adjusting brace for better/more secure fit, wearing brace while sleeping at this time - pt and pt's  verbally and physically demonstrate knowledge and understanding of this    Rationale:  increase pt ed/awareness  to improve the patients ability to safely and effectively perform HEP, transfer in/out of bed and perform ADLs in order to minimize risk of reinjury/delay healing           With   [x] TE   [] TA   [x] neuro   [x] other: self care management Patient Education: [x] Review HEP    [] Progressed/Changed HEP based on:   [] positioning   [] body mechanics   [] transfers   [] heat/ice application    [] other:     Other Objective/Functional Measures:   Progressed ex program per MD instruction - see flow sheet   Observation: Scar Healing well w/ no signs of infection; Skin Temp and Color WNL     Pain Level (0-10 scale) post treatment: 4    ASSESSMENT/Changes in Function:   Progressed ex program following MD instruction/order. Pt continues to progress appropriately demonstrating significantly less hesitation and increased muscular activation/control in comparison to previous sessions. Continued overall good tolerance to session demonstrating ability to complete w/o an increase in pain levels. Will continue to progress activity program as able and tolerated, following MD orders. PN to be completed at next scheduled follow up. Patient will continue to benefit from skilled PT services to modify and progress therapeutic interventions, address functional mobility deficits, address ROM deficits, address strength deficits, analyze and address soft tissue restrictions, analyze and cue movement patterns, analyze and modify body mechanics/ergonomics, assess and modify postural abnormalities, address imbalance/dizziness, and instruct in home and community integration to attain remaining goals. [x]  See Plan of Care  []  See progress note/recertification  []  See Discharge Summary         Progress towards goals / Updated goals:  1. Patient will be I with HEP for carryover to home management               Eval: established  Current: Patient is complaint with HEP twice a day. 9/22/2022    2. Patient will be able to perform 5 sit to stands from a chair with arm rests in 11 secs or less to demonstrate increased independence with transfers. Eval: elevated surface, 30 secs    3. Patient will be able to achieve full extension and flexion as allowed per protocol. Eval: fixed at 30 degrees flexion with brace  Current: Fixed at 70 degrees. 9/26/2022    4. Patient will be able to safely descend and ascend the stairs in upright position vs. Sitting to facilitate continued progress to prior level of function. Eval: scooting up the stairs on her bottom    Long Term Goals: To be accomplished in 10 treatments:  Patient will graduate to Partial weight bearing as able per protocol. Eval: NWB on the Right  Current: Patient is complaint with NWB status and will remain NWB x2 more wks per MD instruction  10/04/2022      2. Patient will be able ambulate (when able per protocol) with one crutch without loss of balance for 200ft without difficulty. Eval: standard rolling walker   Current: Patient is still ambulating with RW and brace.  9/22/2022    PLAN  []  Upgrade activities as tolerated     [x]  Continue plan of care  []  Update interventions per flow sheet       []  Discharge due to:_  [x]  Other: PN to be completed at next scheduled follow up     Karen Sparks DPT 10/11/2022  9:34 AM    Future Appointments   Date Time Provider Pratik Nagel   10/13/2022  3:45 PM Romeo Carrier, DPT [facility]   10/17/2022  1:45 PM ANA Zambrano [facility] BS AMB   10/18/2022  3:45 PM Romeo Carrier, DPT [facility]   10/20/2022  5:15 PM Rebecca Pro DPT [facility]   10/25/2022  3:45 PM Jenny Patience, DPT [facility]   10/27/2022  3:45 PM Jenny Rios, DPT [facility]   7/14/2023  9:15 AM [facility] LAB VISIT IO BS AMB   7/21/2023  9:40 AM Florentino Treviño MD [facility] BS AMB

## 2022-10-13 ENCOUNTER — HOSPITAL ENCOUNTER (OUTPATIENT)
Dept: PHYSICAL THERAPY | Age: 52
Discharge: HOME | End: 2022-10-13
Payer: COMMERCIAL

## 2022-10-13 PROCEDURE — 97110 THERAPEUTIC EXERCISES: CPT

## 2022-10-13 PROCEDURE — 97530 THERAPEUTIC ACTIVITIES: CPT

## 2022-10-13 NOTE — PROGRESS NOTES
PT DAILY TREATMENT NOTE     Patient Name: Kaur Lynch  SWRB:  : 1970  [x]  Patient  Verified  Payor: BLUE CROSS / [facility] [address] / Product Type: PPO /    In time: 3:47P  Out time: 4:30P  Total Treatment Time (min): 43   Visit #: 7 of 18    Medicare/BCBS Only   Total Timed Codes (min):  43 1:1 Treatment Time:  42       Treatment Area: Pain in right knee [M25.561]    SUBJECTIVE  Pain Level (0-10 scale): 1  Any medication changes, allergies to medications, adverse drug reactions, diagnosis change, or new procedure performed?: [x] No    [] Yes (see summary sheet for update)  Subjective functional status/changes:   [] No changes reported  Patient reports a 15% improvement in current condition since beginning skilled PT services. Notes improvements in ability to move/lift leg, independence with dressing, performing transfers/bed mobility and pain levels. Would like to continue PT to target remaining limitations including walking at PLOF, bending/lifting and going up/down stairs. MD follow up scheduled for 10/17/22. OBJECTIVE    25 min Therapeutic Exercise:  [x] See flow sheet :     Rationale: increase ROM and increase strength to improve the patient overall muscle endurance and activity tolerance for ADL's and functional tasks.       18 min Therapeutic Activity:  [x]  See flow sheet : FOTO/Goal Reassessment/ Pt Ed/ Bed Mobility/Transfers   Rationale: increase ROM, increase strength, and pt ed/awareness   to improve the patients ability to return to PLOF           With   [x] TE   [x] TA   [] neuro   [] other:  Patient Education: [x] Review HEP    [] Progressed/Changed HEP based on:   [] positioning   [] body mechanics   [] transfers   [] heat/ice application    [] other:     Other Objective/Functional Measures:   See Goals Below  Right Knee AROM: - 4 - 102 deg/Left Knee AROM: 0 - 134 deg    Pain Level (0-10 scale) post treatment: 1    ASSESSMENT/Changes in Function:   Patient reports a 15% improvement in current condition since beginning skilled PT services. Notes improvements in ability to move/lift leg, independence with dressing, performing transfers/bed mobility and reduced pain levels. Would like to continue PT to target remaining limitations including walking at PLOF, bending/lifting and going up/down stairs. Objectively, pt demonstrates steady progress toward LTGs evident through improvements in right knee AROM, confidence in exercise execution, bed mobility, tranfers and ambulation, quad strength through SLR performance w/o extensor lag and overall knowledge/awareness. Pt with MD follow up appt scheduled for 10/17/2022; tentatively to be cleared from NWB status w/ standing/ambulation. Upon this clearance, anticipate patient making successful steady gains toward reaching remaining LTGs/returning to PLOF. Will continue to progress to next phase of protocol pending MD decision. Patient will continue to benefit from skilled PT services to modify and progress therapeutic interventions, address functional mobility deficits, address ROM deficits, address strength deficits, analyze and address soft tissue restrictions, analyze and cue movement patterns, analyze and modify body mechanics/ergonomics, assess and modify postural abnormalities, address imbalance/dizziness, and instruct in home and community integration to attain remaining goals. []  See Plan of Care  [x]  See progress note/recertification  []  See Discharge Summary         Progress towards goals / Updated goals:  1. Patient will be I with HEP for carryover to home management               Eval: established  Current: MET    2. Patient will be able to perform 5 sit to stands from a chair with arm rests in 11 secs or less to demonstrate increased independence with transfers. Eval: elevated surface, 30 secs   Current: NT: will reassess when pt WB status progresses, 10/13/22    3.  Patient will be able to achieve full extension and flexion as allowed per protocol. Eval: fixed at 30 degrees flexion with brace  Current:  Progressing: Right Knee AROM: - 4 - 102 deg, 10/13/22    4. Patient will be able to safely descend and ascend the stairs in upright position vs. Sitting to facilitate continued progress to prior level of function. Eval: scooting up the stairs on her bottom   Current: per pt and pt's : safely performing at home    Long Term Goals: To be accomplished in 10 treatments:  Patient will graduate to Partial weight bearing as able per protocol. Eval: NWB on the Right  Current: Patient is complaint with NWB status and will remain NWB x2 more wks per MD instruction  10/04/2022    2. Patient will be able ambulate (when able per protocol) with one crutch without loss of balance for 200ft without difficulty.                Eval: standard rolling walker   Current: NT: will reassess when pt WB status progresses, 10/13/22    PLAN  []  Upgrade activities as tolerated     [x]  Continue plan of care  []  Update interventions per flow sheet       []  Discharge due to:_  [x]  Other: PN to be completed at next scheduled follow up     Blanca Floyd DPT 10/13/2022  9:34 AM    Future Appointments   Date Time Provider Pratik Nagel   10/17/2022  1:45 PM ANA Perez [facility] BS AMB   10/18/2022  3:45 PM Brigitte Bernstein DPT [facility]   10/20/2022  5:15 PM Brigitte Bernstein DPT [facility]   10/25/2022  3:45 PM Brigitte Bernstein DPT [facility]   10/27/2022  3:45 PM Brigitte Bernstein DPT [facility]   7/14/2023  9:15 AM IO LAB VISIT [facility] BS AMB   7/21/2023  9:40 AM Maggie Be MD IO BS AMB

## 2022-10-14 NOTE — PROGRESS NOTES
[facility] [address], [address] [place] [address], [address]  [phone] fax    Physician Update  [x] Progress Note  [] Discharge Summary  Patient name: Janie Bailey Start of Care: 2022   Referral source: Marilyn Mills : 1970   Medical/Treatment Diagnosis: Pain in right knee [M25.561]  Payor: BLUE CROSS / [facility] [address] / Product Type: PPO /  Onset Date:22     Prior Hospitalization: see medical history Provider#: [id number]   Medications: Verified on Patient Summary List    Comorbidities: patient reports: thyroid problems, tobacco use  Prior Level of Function: I with ADLs, working desk job full time, playing with grandkids without difficulty. Visits from Start of Care: 7    Missed Visits: 0    Status at Evaluation:   Patient is a pleasant middle aged adult female with Right knee pain s/p traumatic fall on [place] on 22 that resulted in Need for patella reconstructive ORIF on 22. Pain appears to be peripheral neuropathic in nature due to referred symptoms. Treatment will follow surgeon protocol closely. Will contact referring provided for specifics     Progress towards Goals:   1. Patient will be I with HEP for carryover to home management               Eval: established  Current: MET     2. Patient will be able to perform 5 sit to stands from a chair with arm rests in 11 secs or less to demonstrate increased independence with transfers. Eval: elevated surface, 30 secs              Current: NT: will reassess when pt WB status progresses, 10/13/22     3. Patient will be able to achieve full extension and flexion as allowed per protocol. Eval: fixed at 30 degrees flexion with brace  Current:  Progressing: Right Knee AROM: - 4 - 102 deg, 10/13/22     4.  Patient will be able to safely descend and ascend the stairs in upright position vs. Sitting to facilitate continued progress to prior level of function. Eval: scooting up the stairs on her bottom              Current: per pt and pt's : safely performing at home     Long Term Goals: To be accomplished in 10 treatments:  Patient will graduate to Partial weight bearing as able per protocol. Eval: NWB on the Right  Current: Patient is complaint with NWB status and will remain NWB x2 more wks per MD instruction  10/04/2022     2. Patient will be able ambulate (when able per protocol) with one crutch without loss of balance for 200ft without difficulty. Eval: standard rolling walker              Current: NT: will reassess when pt WB status progresses, 10/13/22      Current Status:  Patient reports a 15% improvement in current condition since beginning skilled PT services. Notes improvements in ability to move/lift leg, independence with dressing, performing transfers/bed mobility and reduced pain levels. Would like to continue PT to target remaining limitations including walking at PLOF, bending/lifting and going up/down stairs. Objectively, pt demonstrates steady progress toward LTGs evident through improvements in right knee AROM, confidence in exercise execution, bed mobility, tranfers and ambulation, quad strength through SLR performance w/o extensor lag and overall knowledge/awareness. Pt with MD follow up appt scheduled for 10/17/2022; tentatively to be cleared from NWB status w/ standing/ambulation. Upon this clearance, anticipate patient making successful steady gains toward reaching remaining LTGs/returning to PLOF. Will continue to progress to next phase of protocol pending MD decision.   Patient will continue to benefit from skilled PT services to modify and progress therapeutic interventions, address functional mobility deficits, address ROM deficits, address strength deficits, analyze and address soft tissue restrictions, analyze and cue movement patterns, analyze and modify body mechanics/ergonomics, assess and modify postural abnormalities, address imbalance/dizziness, and instruct in home and community integration to attain remaining goals. Goals: to be achieved in 5 weeks: 1. Patient will demonstrate ability to ascend/descend 4 (six inch) steps w/ B handrails, w/ reciprocal gait pattern and w/o symptom onset, in order to enter/exit home at [facility]  PN: NT; NWB at right LE at this time, 10/13/22  2. Patient will demonstrate right LE SLS >/= 15 sec in order to demonstrate a reduction in fall risk   PN: NT; NWB at right LE at this time, 10/13/22  3. Patient will increase FOTO score >/= 60 points to indicate increased functional mobility. PN: Progressin points, 10/13/22    4. Patient will demonstrate pain free right knee AROM >/= 0 - 130 degrees in order to perform ADLs with greater ease. PN: Progressing: Right Knee AROM:  -4 - 102 deg, 10/13/22      ASSESSMENT/RECOMMENDATIONS:  [x]Continue therapy per initial plan/protocol at a frequency of  2-3 x per week for 5 weeks  []Continue therapy with the following recommended changes:_____________________      _____________________________________________________________________  []Discontinue therapy progressing towards or have reached established goals  []Discontinue therapy due to lack of appreciable progress towards goals  []Discontinue therapy due to lack of attendance or compliance  []Await Physician's recommendations/decisions regarding therapy  []Other:________________________________________________________________    Thank you for this referral. Rosy Mena DPT 10/13/2022 6:07 PM  NOTE TO PHYSICIAN:  PLEASE COMPLETE THE ORDERS BELOW AND   FAX TO [facility]: [phone]  If you are unable to process this request in 24 hours please contact our office: [phone]    I have read the above report and request that my patient continue as recommended.   I have read the above report and request that my patient continue therapy with the following changes/special instructions:_____________________________________  I have read the above report and request that my patient be discharged from therapy.     [de-identified] Signature:____________Date:_________TIME:________     ANA Salas  ** Signature, Date and Time must be completed for valid certification **

## 2022-10-17 ENCOUNTER — OFFICE VISIT (OUTPATIENT)
Dept: ORTHOPEDIC SURGERY | Age: 52
End: 2022-10-17
Payer: COMMERCIAL

## 2022-10-17 VITALS — BODY MASS INDEX: 25.66 KG/M2 | HEART RATE: 83 BPM | WEIGHT: 159 LBS | TEMPERATURE: 97.5 F | OXYGEN SATURATION: 99 %

## 2022-10-17 DIAGNOSIS — S82.001A CLOSED DISPLACED FRACTURE OF RIGHT PATELLA, UNSPECIFIED FRACTURE MORPHOLOGY, INITIAL ENCOUNTER: Primary | ICD-10-CM

## 2022-10-17 PROCEDURE — 73560 X-RAY EXAM OF KNEE 1 OR 2: CPT | Performed by: PHYSICIAN ASSISTANT

## 2022-10-17 PROCEDURE — 99024 POSTOP FOLLOW-UP VISIT: CPT | Performed by: PHYSICIAN ASSISTANT

## 2022-10-17 NOTE — PROGRESS NOTES
Cristela Locke  1970     HISTORY OF PRESENT ILLNESS  Cristela Locke is a 47 y.o. female who presents today for evaluation s/p ORIF right patella fracture on 2022. She is doing better. Patient pain is a 1/10. Patient denies any fever, chills, chest pain, shortness of breath or calf pain. The remainder of the review of systems is negative. There are no new illness or injuries to report since last seen in the office. No changes in medications, allergies, social or family history. PHYSICAL EXAM:   Visit Vitals  Pulse 83   Temp 97.5 °F (36.4 °C) (Temporal)   Wt 159 lb (72.1 kg)   LMP 08/10/2013   SpO2 99%   BMI 25.66 kg/m²      The patient is a well-developed, well-nourished female in no acute distress. The patient is alert and oriented times three. The patient appears to be well groomed. Mood and affect are normal.  ORTHOPEDIC EXAM of right:  Inspection:  bruising not present, effusion present, no edema  Incision well healed  Range of motion: 0-115  ttp incisional  Stability: Stable  Strength: n/a/5    Imagin view xray images of right knee taken in office  on 10/17/2022 read and reviewed by myself reveal reduced right patella fracture with hardware in excellent position. IMPRESSION:      ICD-10-CM ICD-9-CM    1. Closed displaced fracture of right patella, unspecified fracture morphology, initial encounter  S82.001A 822.0 AMB POC XRAY, KNEE; 1/2 VIEWS           PLAN: Patient improving postoperatively. We will continue with gentle range of motion and physical therapy.   She is PWB x 1 week then advance to WBAT    Return to care 3 weeks      HEATH [facility]

## 2022-10-18 ENCOUNTER — HOSPITAL ENCOUNTER (OUTPATIENT)
Dept: PHYSICAL THERAPY | Age: 52
Discharge: HOME | End: 2022-10-18
Payer: COMMERCIAL

## 2022-10-18 PROCEDURE — 97110 THERAPEUTIC EXERCISES: CPT

## 2022-10-18 PROCEDURE — 97112 NEUROMUSCULAR REEDUCATION: CPT

## 2022-10-18 PROCEDURE — 97530 THERAPEUTIC ACTIVITIES: CPT

## 2022-10-18 NOTE — PROGRESS NOTES
PT DAILY TREATMENT NOTE     Patient Name: Guerda BESTYU:  : 1970  [x]  Patient  Verified  [facility]or: Bere White / [facility] [address] / Product Type: PPO /    In time: 3:47P  Out time: 4:30P  Total Treatment Time (min): 43   Visit #: 1 of 15    Medicare/BCBS Only   Total Timed Codes (min):  43 1:1 Treatment Time:  42       Treatment Area: Pain in right knee [M25.561]    SUBJECTIVE  Pain Level (0-10 scale): 2-3  Any medication changes, allergies to medications, adverse drug reactions, diagnosis change, or new procedure performed?: [x] No    [] Yes (see summary sheet for update)  Subjective functional status/changes:   [] No changes reported  Patient reports MD pleased with progress so far progressing her to [place] [facility] x1 wk, followed by WBAT. OBJECTIVE    20 min Therapeutic Exercise:  [x] See flow sheet :     Rationale: increase ROM and increase strength to improve the patient overall muscle endurance and activity tolerance for ADL's and functional tasks.       10 min Therapeutic Activity:  [x]  See flow sheet :     Rationale: increase ROM, increase strength, and pt ed/awareness   to improve the patients ability to return to PLOF     13 min Neuromuscular Re-education:  [x]  See flow sheet :   Rationale: increase strength, improve coordination, and increase proprioception  to improve the patients ability to perform ADL/IADLs with greater ease           With   [x] TE   [x] TA   [x] neuro   [] other:  Patient Education: [x] Review HEP    [] Progressed/Changed HEP based on:   [] positioning   [] body mechanics   [] transfers   [] heat/ice application    [] other:     Other Objective/Functional Measures:   Progressed ex program per flow sheet     Pain Level (0-10 scale) post treatment: 3    ASSESSMENT/Changes in Function:   Progressed pt per MD instruction, emphasizing PWB/gait pattern in which pt demonstrates great understanding/knowledge of expectations (verbally and physically) and safety during performance. Completes all activities during today's session with good tolerance and w/o an increase in pain levels. With pt's level of understanding/safety during gait/transfers and compliance of prescribed HEP, will hold PT until next week, in order to best utilize insured sessions for appropriate progressions. Patient will continue to benefit from skilled PT services to modify and progress therapeutic interventions, address functional mobility deficits, address ROM deficits, address strength deficits, analyze and address soft tissue restrictions, analyze and cue movement patterns, analyze and modify body mechanics/ergonomics, assess and modify postural abnormalities, address imbalance/dizziness, and instruct in home and community integration to attain remaining goals. [x]  See Plan of Care  []  See progress note/recertification  []  See Discharge Summary         Progress towards goals / Updated goals: 1. Patient will demonstrate ability to ascend/descend 4 (six inch) steps w/ B handrails, w/ reciprocal gait pattern and w/o symptom onset, in order to enter/exit home at [facility]  PN: NT; NWB at right LE at this time, 10/13/22  2. Patient will demonstrate right LE SLS >/= 15 sec in order to demonstrate a reduction in fall risk   PN: NT; NWB at right LE at this time, 10/13/22  Current: PWB at this time, will assess when WBAT and appropriate, 10/18/22  3. Patient will increase FOTO score >/= 60 points to indicate increased functional mobility. PN: Progressin points, 10/13/22    Current: ongoing, will reassess at next PN, 10/18/22  4. Patient will demonstrate pain free right knee AROM >/= 0 - 130 degrees in order to perform ADLs with greater ease.      PN: Progressing: Right Knee AROM:  -4 - 102 deg, 10/13/22       PLAN  [x]  Upgrade activities as tolerated     [x]  Continue plan of care  [x]  Update interventions per flow sheet       []  Discharge due to:_  []  Other: Bianca Zhou, DPBERNIE 10/18/2022  9:34 AM    Future Appointments   Date Time Provider Pratik Nagel   10/25/2022  3:45 PM Benny Monroy, DPT [facility]   10/27/2022  3:45 PM Benny Monroy, DPT [facility]   11/1/2022  3:45 PM Benny Monroy, DPT [facility]   11/15/2022  3:45 PM Benny Monroy, DPT [facility]   11/16/2022  1:30 PM ANA [facility] BS AMB   11/17/2022  3:45 PM Benny Monroy, DPT [facility]   11/21/2022  3:45 PM Benny Schafferon, DPT [facility]   11/23/2022  3:45 PM Benny Monroy, DPT [facility]   11/29/2022  3:45 PM Benny Monroy, DPT [facility]   12/1/2022  3:45 PM Benny Monroy, DPT [facility]   7/14/2023  9:15 AM [facility] LAB VISIT [facility] BS AMB   7/21/2023  9:40 AM Derick Lowry MD [facility] BS AMB

## 2022-10-20 ENCOUNTER — APPOINTMENT (OUTPATIENT)
Dept: PHYSICAL THERAPY | Age: 52
End: 2022-10-20
Payer: COMMERCIAL

## 2022-10-25 ENCOUNTER — HOSPITAL ENCOUNTER (OUTPATIENT)
Dept: PHYSICAL THERAPY | Age: 52
Discharge: HOME | End: 2022-10-25
Payer: COMMERCIAL

## 2022-10-25 PROCEDURE — 97112 NEUROMUSCULAR REEDUCATION: CPT

## 2022-10-25 PROCEDURE — 97110 THERAPEUTIC EXERCISES: CPT

## 2022-10-25 PROCEDURE — 97530 THERAPEUTIC ACTIVITIES: CPT

## 2022-10-26 NOTE — PROGRESS NOTES
PT DAILY TREATMENT NOTE     Patient Name: Jim Wilson  NYRY:  : 1970  [x]  Patient  Verified  Payor: BLUE CROSS / [facility] [address] / Product Type: PPO /    In time: 3:49P  Out time: 4:38P  Total Treatment Time (min): 52  Visit #: 2 of 15    Medicare/BCBS Only   Total Timed Codes (min):  49 1:1 Treatment Time:  44       Treatment Area: Pain in right knee [M25.561]    SUBJECTIVE  Pain Level (0-10 scale): 2-3  Any medication changes, allergies to medications, adverse drug reactions, diagnosis change, or new procedure performed?: [x] No    [] Yes (see summary sheet for update)  Subjective functional status/changes:   [] No changes reported  Patient reports overall doing well w/ no new significant concerns. OBJECTIVE    21 min Therapeutic Exercise:  [x] See flow sheet :     Rationale: increase ROM and increase strength to improve the patient overall muscle endurance and activity tolerance for ADL's and functional tasks.       14 min Therapeutic Activity:  [x]  See flow sheet :     Rationale: increase ROM, increase strength, and pt ed/awareness   to improve the patients ability to return to PLOF     14 min Neuromuscular Re-education:  [x]  See flow sheet :   Rationale: increase strength, improve coordination, and increase proprioception  to improve the patients ability to perform ADL/IADLs with greater ease           With   [x] TE   [x] TA   [x] neuro   [] other:  Patient Education: [x] Review HEP    [] Progressed/Changed HEP based on:   [] positioning   [] body mechanics   [] transfers   [] heat/ice application    [] other:     Other Objective/Functional Measures:   Progressed activity program per MD instruction - including WBAT ambulation and exercises w/ brace    Pain Level (0-10 scale) post treatment: 3    ASSESSMENT/Changes in Function:   Patient w/ good tolerance and understanding of progression of next phase of rehab including appropriate gradual strategies to increase weight/stability through right LE. Able to compete all activities during session w/o an increase in pain levels or balance concerns. Occasional rest breaks required for adjustment of brace for optimal fitting. Will continue to progress activity program as able and tolerated, following MD post op protocol    Patient will continue to benefit from skilled PT services to modify and progress therapeutic interventions, address functional mobility deficits, address ROM deficits, address strength deficits, analyze and address soft tissue restrictions, analyze and cue movement patterns, analyze and modify body mechanics/ergonomics, assess and modify postural abnormalities, address imbalance/dizziness, and instruct in home and community integration to attain remaining goals. [x]  See Plan of Care  []  See progress note/recertification  []  See Discharge Summary         Progress towards goals / Updated goals: 1. Patient will demonstrate ability to ascend/descend 4 (six inch) steps w/ B handrails, w/ reciprocal gait pattern and w/o symptom onset, in order to enter/exit home at [facility]  PN: NT; NWB at right LE at this time, 10/13/22  2. Patient will demonstrate right LE SLS >/= 15 sec in order to demonstrate a reduction in fall risk   PN: NT; NWB at right LE at this time, 10/13/22  Current: PWB at this time, will assess when WBAT and appropriate, 10/18/22  3. Patient will increase FOTO score >/= 60 points to indicate increased functional mobility. PN: Progressin points, 10/13/22    Current: ongoing, will reassess at next PN, 10/25/22  4. Patient will demonstrate pain free right knee AROM >/= 0 - 130 degrees in order to perform ADLs with greater ease.      PN: Progressing: Right Knee AROM:  -4 - 102 deg, 10/13/22       PLAN  [x]  Upgrade activities as tolerated     [x]  Continue plan of care  [x]  Update interventions per flow sheet       []  Discharge due to:_  []  Other:      Marta Mir DPT 10/25/2022  9:34 AM    Future Appointments   Date Time Provider Pratik Nagel   10/27/2022  3:45 PM Royden Both, DPT [facility]   11/1/2022  3:45 PM Royden Both, DPT [facility]   11/15/2022  3:45 PM Royden Both, DPT [facility]   11/16/2022  1:30 PM ANA Mckinley [facility] BS AMB   11/17/2022  3:45 PM Royden Both, DPT [facility]   11/21/2022  3:45 PM Royden Both, DPT [facility]   11/23/2022  3:45 PM Royden Both, DPT [facility]   11/29/2022  3:45 PM Royden Both, DPT [facility]   12/1/2022  3:45 PM Royden Both, DPT [facility]   7/14/2023  9:15 AM [facility] LAB VISIT [facility] AMB   7/21/2023  9:40 AM Chelly Waller MD [facility] BS AMB

## 2022-10-27 ENCOUNTER — HOSPITAL ENCOUNTER (OUTPATIENT)
Dept: PHYSICAL THERAPY | Age: 52
Discharge: HOME | End: 2022-10-27
Payer: COMMERCIAL

## 2022-10-27 ENCOUNTER — PATIENT MESSAGE (OUTPATIENT)
Dept: ORTHOPEDIC SURGERY | Age: 52
End: 2022-10-27

## 2022-10-27 PROCEDURE — 97110 THERAPEUTIC EXERCISES: CPT

## 2022-10-27 PROCEDURE — 97112 NEUROMUSCULAR REEDUCATION: CPT

## 2022-10-27 PROCEDURE — 97530 THERAPEUTIC ACTIVITIES: CPT

## 2022-10-27 NOTE — PROGRESS NOTES
PT DAILY TREATMENT NOTE     Patient Name: Lesly rWight  KUJZ:  : 1970  [x]  Patient  Verified  Payor: Immanuel Spence / [facility] [address] / Product Type: PPO /    In time: 3:47P  Out time: 4:34P  Total Treatment Time (min): 52  Visit #: 3 of 15    Medicare/BCBS Only   Total Timed Codes (min):  47 1:1 Treatment Time:  44       Treatment Area: Pain in right knee [M25.561]    SUBJECTIVE  Pain Level (0-10 scale): 2-3  Any medication changes, allergies to medications, adverse drug reactions, diagnosis change, or new procedure performed?: [x] No    [] Yes (see summary sheet for update)  Subjective functional status/changes:   [] No changes reported  Patient reports overall doing well - reports being sore after the previous session, however no increase in original symptom flare up    OBJECTIVE    10 min Therapeutic Exercise:  [x] See flow sheet :     Rationale: increase ROM and increase strength to improve the patient overall muscle endurance and activity tolerance for ADL's and functional tasks.       20 min Therapeutic Activity:  [x]  See flow sheet :     Rationale: increase ROM, increase strength, and pt ed/awareness   to improve the patients ability to perform functional transfers, movements and ambulation with greatest ease and efficiency     17 min Neuromuscular Re-education:  [x]  See flow sheet :   Rationale: increase strength, improve coordination, and increase proprioception  to improve the patients ability to perform ADL/IADLs with greater ease           With   [x] TE   [x] TA   [x] neuro   [] other:  Patient Education: [x] Review HEP    [] Progressed/Changed HEP based on:   [] positioning   [] body mechanics   [] transfers   [] heat/ice application    [x] other: Ed pt, and pt's , to progress to [facility] for indoor/household surfaces - both demonstrate understanding and in agreement     Other Objective/Functional Measures:   Progressed activity program per flow sheet - progressing WBAT activities, ambulation to [address], stair navigation on 4 in step   Gait: SPC, >500 ft; first 200 ft w/ CGA followed by SBA/Supervised (A) remaining   Stair Navigation: (4 in) 6 steps x2, B hand rails;  CGA first trial followed by SBA/Supervised (A) at second trial     Pain Level (0-10 scale) post treatment: 2-3    ASSESSMENT/Changes in Function:   Patient w/ continued good tolerance and understanding of WBAT precaution and progression of sessions. Progressed to [address] and stair climbing activities on 4 in step, in which pt demonstrates significant hesitation initially followed by optimal confidence, therapeutic carryover/understanding of appropriate sequencing, and safety during performance. Able to compete all activities during session w/o an increase in pain levels or balance concerns. Occasional rest breaks required for adjustment of brace for optimal fitting. Will continue to progress activity program as able and tolerated, following MD post op protocol. Patient will continue to benefit from skilled PT services to modify and progress therapeutic interventions, address functional mobility deficits, address ROM deficits, address strength deficits, analyze and address soft tissue restrictions, analyze and cue movement patterns, analyze and modify body mechanics/ergonomics, assess and modify postural abnormalities, address imbalance/dizziness, and instruct in home and community integration to attain remaining goals. [x]  See Plan of Care  []  See progress note/recertification  []  See Discharge Summary         Progress towards goals / Updated goals: 1. Patient will demonstrate ability to ascend/descend 4 (six inch) steps w/ B handrails, w/ reciprocal gait pattern and w/o symptom onset, in order to enter/exit home at [facility]  PN: NT; NWB at right  at this time, 10/13/22  Current: Progressing: four inch step x6, navigates w/ step to gait pattern, CGA-SBA, B handrails, w/ no symptom increase, 10/27/22   2. Patient will demonstrate right LE SLS >/= 15 sec in order to demonstrate a reduction in fall risk   PN: NT; NWB at right LE at this time, 10/13/22  Current: PWB at this time, will assess when WBAT and appropriate, 10/18/22  3. Patient will increase FOTO score >/= 60 points to indicate increased functional mobility. PN: Progressin points, 10/13/22    Current: ongoing, will reassess at next PN, 10/25/22  4. Patient will demonstrate pain free right knee AROM >/= 0 - 130 degrees in order to perform ADLs with greater ease.      PN: Progressing: Right Knee AROM:  -4 - 102 deg, 10/13/22       PLAN  [x]  Upgrade activities as tolerated     [x]  Continue plan of care  [x]  Update interventions per flow sheet       []  Discharge due to:_  []  Other:      Royal Mealing, DPT 10/27/2022  9:34 AM    Future Appointments   Date Time Provider Pratik Nagel   2022  3:45 PM Debarah [place], DPT [facility]   11/15/2022  3:45 PM Debarah [place], DPT [facility]   2022  1:30 PM ANA [facility] BS AMB   2022  3:45 PM Debarah [place], DPT [facility]   2022  3:45 PM Debarah [place], DPT [facility]   2022  3:45 PM Debarah [place], DPT [facility]   2022  3:45 PM Debarah [place], DPT [facility]   2022  3:45 PM Debarah [place], DPT [facility]   2023  9:15 AM [facility] LAB VISIT [facility] BS AMB   2023  9:40 AM Adina Cortez MD [facility] BS AMB

## 2022-10-28 NOTE — TELEPHONE ENCOUNTER
From: Vadim Maria  To: ANA Hoffman  Sent: 10/27/2022 3:12 PM EDT  Subject: Brace    Hello,  Any advise on the brace, it keeps sliding down my leg. It seems like no matter how tight I get it.   thank you,  Agustin Wilkerson

## 2022-11-01 ENCOUNTER — HOSPITAL ENCOUNTER (OUTPATIENT)
Dept: PHYSICAL THERAPY | Age: 52
Discharge: HOME | End: 2022-11-01
Payer: COMMERCIAL

## 2022-11-01 PROCEDURE — 97530 THERAPEUTIC ACTIVITIES: CPT

## 2022-11-01 PROCEDURE — 97140 MANUAL THERAPY 1/> REGIONS: CPT

## 2022-11-01 PROCEDURE — 97110 THERAPEUTIC EXERCISES: CPT

## 2022-11-01 PROCEDURE — 97112 NEUROMUSCULAR REEDUCATION: CPT

## 2022-11-07 NOTE — PROGRESS NOTES
PT DAILY TREATMENT NOTE     Patient Name: Everett Amaro  Date:2022  : 1970  [x]  Patient  Verified  Payor: BLUE CROSS / Plan: [facility] REYES [employer] [address] / Product Type: PPO /    In time: 3:45P  Out time: 4:45P  Total Treatment Time (min): 60  Visit #: 4 of 15    Medicare/BCBS Only   Total Timed Codes (min):  60 1:1 Treatment Time:  55       Treatment Area: Pain in right knee [M25.561]    SUBJECTIVE  Pain Level (0-10 scale): 3  Any medication changes, allergies to medications, adverse drug reactions, diagnosis change, or new procedure performed?: [x] No    [] Yes (see summary sheet for update)  Subjective functional status/changes:   [] No changes reported  Patient reports doing well with no new concerns. Reports being cleared from brace by MD. Reports right ankle foot bothering her more than anything, however symptoms not intense/debilitating. OBJECTIVE    15 min Therapeutic Exercise:  [x] See flow sheet :     Rationale: increase ROM and increase strength to improve the patient overall muscle endurance and activity tolerance for ADL's and functional tasks. 20 min Therapeutic Activity:  [x]  See flow sheet :     Rationale: increase ROM, increase strength, and pt ed/awareness   to improve the patients ability to perform functional transfers, movements and ambulation with greatest ease and efficiency     15 min Neuromuscular Re-education:  [x]  See flow sheet :   Rationale: increase strength, improve coordination, and increase proprioception  to improve the patients ability to perform ADL/IADLs with greater ease     10 min Manual Therapy: (reclined): STM/TPr to right gastoc/soleus w/ manual stretching of right gastroc    The manual therapy interventions were performed at a separate and distinct time from the therapeutic activities interventions.   Rationale: decrease pain, increase ROM, increase tissue extensibility, and decrease trigger points to increase tolerance to therex and overall return to PLOF            With   [x] TE   [x] TA   [x] neuro   [] other:  Patient Education: [x] Review HEP    [] Progressed/Changed HEP based on:   [] positioning   [] body mechanics   [] transfers   [] heat/ice application    [x] other: reviewed/ed importance of continuing particular HEP ex issued at beginning of therapy - pt verbalizes understanding      Other Objective/Functional Measures:   Progressed activity program per flow sheet    Gait: SPC, >200 ft, Supervised (A) - Mod (I) on outdoor surfaces (through grass and up/down curbs); >300ft, S Supervised (A) - Mod (I) on indoor surfaces w/o AD    Pain Level (0-10 scale) post treatment: 3    ASSESSMENT/Changes in Function:   Patient w/ great tolerance to today's session demonstrating ability to complete w/o symptom flare up. Upon leaving session pt returns to gym with report of right ankle bothering her more. MT techniques w/ pt ed provided, in which symptoms returned to baseline value. Will continue to progress activity program as able and tolerated. Patient will continue to benefit from skilled PT services to modify and progress therapeutic interventions, address functional mobility deficits, address ROM deficits, address strength deficits, analyze and address soft tissue restrictions, analyze and cue movement patterns, analyze and modify body mechanics/ergonomics, assess and modify postural abnormalities, address imbalance/dizziness, and instruct in home and community integration to attain remaining goals. [x]  See Plan of Care  []  See progress note/recertification  []  See Discharge Summary         Progress towards goals / Updated goals: 1. Patient will demonstrate ability to ascend/descend 4 (six inch) steps w/ B handrails, w/ reciprocal gait pattern and w/o symptom onset, in order to enter/exit home at [facility]  PN: NT; NWB at right LE at this time, 10/13/22  Current: Progressing: four inch step x6, navigates w/ step to gait pattern, CGA-SBA, B handrails, w/ no symptom increase, 10/27/22   2. Patient will demonstrate right LE SLS >/= 15 sec in order to demonstrate a reduction in fall risk   PN: NT; NWB at right LE at this time, 10/13/22  Current: PWB at this time, will assess when WBAT and appropriate, 10/18/22  3. Patient will increase FOTO score >/= 60 points to indicate increased functional mobility. PN: Progressin points, 10/13/22    Current: ongoing, will reassess at next PN, 22  4. Patient will demonstrate pain free right knee AROM >/= 0 - 130 degrees in order to perform ADLs with greater ease.      PN: Progressing: Right Knee AROM:  -4 - 102 deg, 10/13/22       PLAN  [x]  Upgrade activities as tolerated     [x]  Continue plan of care  [x]  Update interventions per flow sheet       []  Discharge due to:_  []  Other:      Sean Olmedo DPT 2022  9:34 AM    Future Appointments   Date Time Provider Pratik Nagel   11/15/2022  3:45 PM Jewett Junk, DPT [facility]   2022  1:30 PM ANA Vicente [facility] AMB   2022  3:45 PM Jimbo Junk, DPT [facility]   2022  3:45 PM Jimbo Junk, DPT [facility]   2022  3:45 PM Jewett Junk, DPT [facility]   2022  3:45 PM Jimbo Junk, DPT [facility]   2022  3:45 PM Jewett Junk, DPT [facility]   2023  9:15 AM [facility] LAB VISIT [facility] BS AMB   2023  9:40 AM Lupis Boyd MD [facility] BS AMB

## 2022-11-15 ENCOUNTER — HOSPITAL ENCOUNTER (OUTPATIENT)
Dept: PHYSICAL THERAPY | Age: 52
Discharge: HOME | End: 2022-11-15
Payer: COMMERCIAL

## 2022-11-15 PROCEDURE — 97110 THERAPEUTIC EXERCISES: CPT

## 2022-11-15 PROCEDURE — 97535 SELF CARE MNGMENT TRAINING: CPT

## 2022-11-15 PROCEDURE — 97530 THERAPEUTIC ACTIVITIES: CPT

## 2022-11-15 PROCEDURE — 97112 NEUROMUSCULAR REEDUCATION: CPT

## 2022-11-15 NOTE — PROGRESS NOTES
[facility] [address], [address] [place] [address], [address] [phone] fax    Physician Update  [x] Progress Note  [] Discharge Summary  Patient name: Guerda Feng Start of Care: 2022   Referral source: Sree Martino, [id number] : 1970   Medical/Treatment Diagnosis: Pain in right knee [M25.561]  Payor: BLUE CROSS / [facility] [address] / Product Type: PPO /  Onset Date:22     Prior Hospitalization: see medical history Provider#: [id number]   Medications: Verified on Patient Summary List    Comorbidities: patient reports: thyroid problems, tobacco use  Prior Level of Function: I with ADLs, working desk job full time, playing with grandkids without difficulty. Visits from Start of Care: 11   Missed Visits: 0    Status at Evaluation:   Patient is a pleasant middle aged adult female with Right knee pain s/p traumatic fall on [place] on 22 that resulted in Need for patella reconstructive ORIF on 22. Pain appears to be peripheral neuropathic in nature due to referred symptoms. Treatment will follow surgeon protocol closely. Will contact referring provided for specifics     Status at Last Progress Note:  Patient reports a 15% improvement in current condition since beginning skilled PT services. Notes improvements in ability to move/lift leg, independence with dressing, performing transfers/bed mobility and reduced pain levels. Would like to continue PT to target remaining limitations including walking at PLOF, bending/lifting and going up/down stairs. Objectively, pt demonstrates steady progress toward LTGs evident through improvements in right knee AROM, confidence in exercise execution, bed mobility, tranfers and ambulation, quad strength through SLR performance w/o extensor lag and overall knowledge/awareness.   Pt with MD follow up appt scheduled for 10/17/2022; tentatively to be cleared from NWB status w/ standing/ambulation. Upon this clearance, anticipate patient making successful steady gains toward reaching remaining LTGs/returning to PLOF. Will continue to progress to next phase of protocol pending MD decision. Patient will continue to benefit from skilled PT services to modify and progress therapeutic interventions, address functional mobility deficits, address ROM deficits, address strength deficits, analyze and address soft tissue restrictions, analyze and cue movement patterns, analyze and modify body mechanics/ergonomics, assess and modify postural abnormalities, address imbalance/dizziness, and instruct in home and community integration to attain remaining goals. Progress towards Goals: 1. Patient will demonstrate ability to ascend/descend 4 (six inch) steps w/ B handrails, w/ reciprocal gait pattern and w/o symptom onset, in order to enter/exit home at [facility]  PN: NT; NWB at right LE at this time, 10/13/22  Current: Progressing: four inch step x6, ascends w/ reciprocal gait pattern and descends w/ step to gait pattern, Supervised (A), right ascending handrail, w/ no symptom increase, 11/15/22     2. Patient will demonstrate right LE SLS >/= 15 sec in order to demonstrate a reduction in fall risk   PN: NT; NWB at right LE at this time, 10/13/22  Current: Met: 15 sec, 11/15/22    3. Patient will increase FOTO score >/= 60 points to indicate increased functional mobility. PN: Progressin points, 10/13/22    Current: Regressed: 40 points, 11/15/22    4. Patient will demonstrate pain free right knee AROM >/= 0 - 130 degrees in order to perform ADLs with greater ease.      PN: Progressing: Right Knee AROM:  -4 - 102 deg, 10/13/22  Current: Met: Right Knee AROM:  0 - 130 deg, 11/15/22      Other Functional Measurements:   Gait: SPC, Mod (I) on uneven surfaces; no AD, Mod (I) on level surfaces (short distances secondary to self reported fatigue)    Current Status:  Pt reports a 70-75% improvement in current condition since beginning skilled PT services. Notes improvements with pain intensity and ability to ambulate, bend and go up/down stairs with greater confidence and independence. Remaining limitations reported include ascending/descending stairs with reciprocal gait pattern, ambulating at PLOF and being able to sit like she used to. Objectively, pt demonstrates continued improvements in overall functional status, AROM, strength and stability. Continues to be hesitant initiating new activities during sessions, however with continued compliance with in person sessions and prescribed HEP, activity program will steadily progress - leading to pt reaching remaining LTGs. Patient will continue to benefit from skilled PT services to modify and progress therapeutic interventions, address functional mobility deficits, address ROM deficits, address strength deficits, analyze and address soft tissue restrictions, analyze and cue movement patterns, analyze and modify body mechanics/ergonomics, assess and modify postural abnormalities, address imbalance/dizziness, and instruct in home and community integration to attain remaining goals. Goals: to be achieved in 5 weeks: 1. Patient will demonstrate ability to ascend/descend 4 (six inch) steps w/ B handrails, w/ reciprocal gait pattern and w/o symptom onset, in order to enter/exit home at [facility]  PN: Progressing: four inch step x6, ascends w/ reciprocal gait pattern and descends w/ step to gait pattern, Supervised (A), right ascending handrail, w/ no symptom increase, 11/15/22   2. Patient will demonstrate and report ability to ambulate >1000 ft, Ind w/o AD, on indoor and outdoor suraces to demonstrate a full return to PLOF  PN: SPC, Mod (I) on uneven surfaces; no AD, Mod (I) on level surfaces (short distances secondary to self reported fatigue), 11/15/22  3. Patient will increase FOTO score >/= 60 points to indicate increased functional mobility.      PN: Regressed: 40 points, 11/15/22      ASSESSMENT/RECOMMENDATIONS:  [x]Continue therapy per initial plan/protocol at a frequency of  2x per week for 5 weeks  []Continue therapy with the following recommended changes:_____________________      _____________________________________________________________________  []Discontinue therapy progressing towards or have reached established goals  []Discontinue therapy due to lack of appreciable progress towards goals  []Discontinue therapy due to lack of attendance or compliance  []Await Physician's recommendations/decisions regarding therapy  []Other:________________________________________________________________    Thank you for this referral. Karen Sparks, BRAIN 11/15/2022 6:07 PM  NOTE TO PHYSICIAN:  PLEASE COMPLETE THE ORDERS BELOW AND   FAX TO [facility]: [phone]  If you are unable to process this request in 24 hours please contact our office: [phone]    I have read the above report and request that my patient continue as recommended. I have read the above report and request that my patient continue therapy with the following changes/special instructions:_____________________________________  I have read the above report and request that my patient be discharged from therapy.     [de-identified] Signature:____________Date:_________TIME:________     ANA Zambrano  ** Signature, Date and Time must be completed for valid certification **

## 2022-11-16 ENCOUNTER — OFFICE VISIT (OUTPATIENT)
Dept: ORTHOPEDIC SURGERY | Age: 52
End: 2022-11-16
Payer: COMMERCIAL

## 2022-11-16 DIAGNOSIS — S82.001D CLOSED DISPLACED FRACTURE OF RIGHT PATELLA WITH ROUTINE HEALING, UNSPECIFIED FRACTURE MORPHOLOGY, SUBSEQUENT ENCOUNTER: Primary | ICD-10-CM

## 2022-11-16 PROCEDURE — 99024 POSTOP FOLLOW-UP VISIT: CPT | Performed by: PHYSICIAN ASSISTANT

## 2022-11-16 RX ORDER — GABAPENTIN 300 MG/1
300 CAPSULE ORAL 2 TIMES DAILY
Qty: 60 CAPSULE | Refills: 0 | Status: SHIPPED | OUTPATIENT
Start: 2022-11-16

## 2022-11-16 NOTE — PROGRESS NOTES
Tresa Boas  1970     HISTORY OF PRESENT ILLNESS  Tresa Boas is a 47 y.o. female who presents today for evaluation s/p ORIF right patella fracture on 9/12/2022. She is doing better. Patient pain is a 0/10. Using only a cane today. Patient denies any fever, chills, chest pain, shortness of breath or calf pain. The remainder of the review of systems is negative. There are no new illness or injuries to report since last seen in the office. No changes in medications, allergies, social or family history. PHYSICAL EXAM:   Visit Vitals  [facility] 08/10/2013      The patient is a well-developed, well-nourished female in no acute distress. The patient is alert and oriented times three. The patient appears to be well groomed. Mood and affect are normal.  ORTHOPEDIC EXAM of right:  Inspection:  bruising not present, effusion not present, no edema  Incision well healed  Range of motion: 0-125  ttp incisional  Stability: Stable  Strength: n/a/5        IMPRESSION:      ICD-10-CM ICD-9-CM    1. Closed displaced fracture of right patella with routine healing, unspecified fracture morphology, subsequent encounter  S82.001D V54.16              PLAN: Patient improving postoperatively. We will continue with physical therapy working on strengthening. Last prescription of gabapentin to help wean her off was given today.     Return to care 6 weeks      [facility]

## 2022-11-17 ENCOUNTER — HOSPITAL ENCOUNTER (OUTPATIENT)
Dept: PHYSICAL THERAPY | Age: 52
Discharge: HOME | End: 2022-11-17
Payer: COMMERCIAL

## 2022-11-17 PROCEDURE — 97530 THERAPEUTIC ACTIVITIES: CPT

## 2022-11-17 PROCEDURE — 97112 NEUROMUSCULAR REEDUCATION: CPT

## 2022-11-17 PROCEDURE — 97110 THERAPEUTIC EXERCISES: CPT

## 2022-11-18 NOTE — PROGRESS NOTES
PT DAILY TREATMENT NOTE     Patient Name: Cris Mancilla  Date:2022  : 1970  [x]  Patient  Verified  Payor: BLUE CROSS / Plan: [facility] REYES [employer] [address] / Product Type: PPO /    In time: 3:45P  Out time: 4:38P  Total Treatment Time (min): 48  Visit #: 2 of 10    Medicare/BCBS Only   Total Timed Codes (min):  53 1:1 Treatment Time:  48       Treatment Area: Pain in right knee [M25.561]    SUBJECTIVE  Pain Level (0-10 scale): 2-3  Any medication changes, allergies to medications, adverse drug reactions, diagnosis change, or new procedure performed?: [x] No    [] Yes (see summary sheet for update)  Subjective functional status/changes:   [] No changes reported  Pt reports doing well with no new concerns following previous session. States MD appt went well yesterday w/ MD being pleased with progress so far. OBJECTIVE    13 min Therapeutic Exercise:  [x] See flow sheet :     Rationale: increase ROM and increase strength to improve the patient overall muscle endurance and activity tolerance for ADL's and functional tasks. 25 min Therapeutic Activity:  [x]  See flow sheet :     Rationale: increase ROM, increase strength, and improve coordination  to improve the patients ability to return to PLOF     15 min Neuromuscular Re-education:  [x]  See flow sheet :   Rationale: increase strength, improve coordination, and increase proprioception  to improve the patients ability to perform ADL/IADLs with greater ease           With   [x] TE   [x] TA   [x] neuro   [] other:  Patient Education: [x] Review HEP    [] Progressed/Changed HEP based on:   [] positioning   [] body mechanics   [] transfers   [] heat/ice application    [] other:     Other Objective/Functional Measures:   Progressed ex program per flow sheet     Pain Level (0-10 scale) post treatment: 2-3    ASSESSMENT/Changes in Function:   Pt continues to demonstrate steady progress toward remaining LTGs.  Appears to be becoming more confident with new activities being introduced during sessions. Continues to mention right ankle having slight discomfort, however not unbearable and/or limiting in the sense of completing desired tasks during sessions. Reviewed the importance of verbalizing if ss/sx worsen in order to best tailor POC - pt verbalizes knowledge and understanding. Will continue to progress activity program as able and tolerated. Patient will continue to benefit from skilled PT services to modify and progress therapeutic interventions, address functional mobility deficits, address ROM deficits, address strength deficits, analyze and address soft tissue restrictions, analyze and cue movement patterns, analyze and modify body mechanics/ergonomics, assess and modify postural abnormalities, address imbalance/dizziness, and instruct in home and community integration to attain remaining goals. [x]  See Plan of Care  []  See progress note/recertification  []  See Discharge Summary         Progress towards goals / Updated goals: 1. Patient will demonstrate ability to ascend/descend 4 (six inch) steps w/ B handrails, w/ reciprocal gait pattern and w/o symptom onset, in order to enter/exit home at [facility]  PN: Progressing: four inch step x6, ascends w/ reciprocal gait pattern and descends w/ step to gait pattern, Supervised (A), right ascending handrail, w/ no symptom increase, 11/15/22   2. Patient will demonstrate and report ability to ambulate >1000 ft, Ind w/o AD, on indoor and outdoor suraces to demonstrate a full return to PLOF  PN: SPC, Mod (I) on uneven surfaces; no AD, Mod (I) on level surfaces (short distances secondary to self reported fatigue), 11/15/22  3. Patient will increase FOTO score >/= 60 points to indicate increased functional mobility.      PN: Regressed: 40 points, 11/15/22     PLAN  [x]  Upgrade activities as tolerated     [x]  Continue plan of care  [x]  Update interventions per flow sheet       []  Discharge due to:_  []  Other:      Lona Doss, DPT 11/17/2022  9:34 AM    Future Appointments   Date Time Provider Pratik Shona   11/21/2022  3:45 PM Foster [place], DPT [facility]   11/23/2022  3:45 PM Foster [place], DPT [facility]   11/29/2022  3:45 PM Foster [place], DPT [facility]   12/1/2022  3:45 PM Foster [place], DPT [facility]   12/6/2022  3:45 PM Foster [place], DPT [facility]   12/8/2022  3:45 PM Foster [place], DPT [facility]   12/13/2022  3:45 PM Foster [place], DPT [facility]   12/15/2022  3:45 PM Foster [place], DPT [facility]   12/20/2022  3:45 PM Foster [place], DPT [facility]   12/22/2022  3:45 PM Foster [place], DPT [facility]   12/28/2022  1:00 PM Luellen Mcburney, PA [facility] BS AMB   12/29/2022  3:45 PM Foster [place], DPT [facility]   7/14/2023  9:15 AM [facility] LAB VISIT [facility] AMB   7/21/2023  9:40 AM Edita Lopez MD [facility] AMB

## 2022-11-21 ENCOUNTER — HOSPITAL ENCOUNTER (OUTPATIENT)
Dept: PHYSICAL THERAPY | Age: 52
Discharge: HOME | End: 2022-11-21
Payer: COMMERCIAL

## 2022-11-21 PROCEDURE — 97530 THERAPEUTIC ACTIVITIES: CPT

## 2022-11-21 PROCEDURE — 97112 NEUROMUSCULAR REEDUCATION: CPT

## 2022-11-21 PROCEDURE — 97110 THERAPEUTIC EXERCISES: CPT

## 2022-11-21 NOTE — PROGRESS NOTES
PT DAILY TREATMENT NOTE     Patient Name: Ny Ayala  Date:2022  : 1970  [x]  Patient  Verified  Payor: [employer] / Plan: [employer] [address] / Product Type: PPO /    In time: 3:45P  Out time: 4:29P  Total Treatment Time (min): 44  Visit #: 3 of 10    Medicare/BCBS Only   Total Timed Codes (min):  44 1:1 Treatment Time:  44       Treatment Area: Pain in right knee [M25.561]    SUBJECTIVE  Pain Level (0-10 scale): 2-3  Any medication changes, allergies to medications, adverse drug reactions, diagnosis change, or new procedure performed?: [x] No    [] Yes (see summary sheet for update)  Subjective functional status/changes:   [] No changes reported  Pt reports experiencing increased muscular soreness following previous session, causing her to have difficulties sleeping one night. Reports soreness subsided the following day. Reports not completing prescribed stretches like she should. OBJECTIVE    14 min Therapeutic Exercise:  [x] See flow sheet :     Rationale: increase ROM and increase strength to improve the patient overall muscle endurance and activity tolerance for ADL's and functional tasks.       20 min Therapeutic Activity:  [x]  See flow sheet :     Rationale: increase ROM, increase strength, and improve coordination  to improve the patients ability to return to PLOF     10 min Neuromuscular Re-education:  [x]  See flow sheet :   Rationale: increase strength, improve coordination, and increase proprioception  to improve the patients ability to perform ADL/IADLs with greater ease           With   [x] TE   [x] TA   [x] neuro   [] other:  Patient Education: [x] Review HEP    [] Progressed/Changed HEP based on:   [] positioning   [] body mechanics   [] transfers   [] heat/ice application    [x] other: reviewed desired frequency of performed stretches - pt verbally demonstrates understanding      Other Objective/Functional Measures:   Modified session secondary to pt report noted above  Stair Navigation: ascends w/ reciprocal gait pattern (8 in step); descends w/ reciprocal gait pattern (6 in step), Supervised (A), right ascending handrail, w/ no symptom increase, hesitation/fear present w/ min decreased right eccentric control upon decent    Pain Level (0-10 scale) post treatment: 2     ASSESSMENT/Changes in Function:   Pt continues to demonstrate steady progress toward remaining LTGs evident by objective measurements noted above. Leaves session w/o symptom flare up. Will continue to progress activity program to target remaining deficits/functional limitations. Patient will continue to benefit from skilled PT services to modify and progress therapeutic interventions, address functional mobility deficits, address ROM deficits, address strength deficits, analyze and address soft tissue restrictions, analyze and cue movement patterns, analyze and modify body mechanics/ergonomics, assess and modify postural abnormalities, address imbalance/dizziness, and instruct in home and community integration to attain remaining goals. [x]  See Plan of Care  []  See progress note/recertification  []  See Discharge Summary         Progress towards goals / Updated goals: 1. Patient will demonstrate ability to ascend/descend 4 (six inch) steps w/ B handrails, w/ reciprocal gait pattern and w/o symptom onset, in order to enter/exit home at [facility]  PN: Progressing: ascends w/ reciprocal gait pattern (8 in step); descends w/ reciprocal gait pattern (6 in step), Supervised (A), right ascending handrail, w/ no symptom increase, hesitation/fear present w/ min decreased right eccentric control upon decent, 11/21/22   2.  Patient will demonstrate and report ability to ambulate >1000 ft, Ind w/o AD, on indoor and outdoor suraces to demonstrate a full return to OF  PN: SPC, Mod (I) on uneven surfaces; no AD, Mod (I) on level surfaces (short distances secondary to self reported fatigue), 11/15/22  3. Patient will increase FOTO score >/= 60 points to indicate increased functional mobility.      PN: Regressed: 40 points, 11/15/22   Current: ongoing, will reassess at next PN, 11/21/22    PLAN  [x]  Upgrade activities as tolerated     [x]  Continue plan of care  [x]  Update interventions per flow sheet       []  Discharge due to:_  []  Other:      Chica Walker DPT 11/21/2022  9:34 AM    Future Appointments   Date Time Provider Pratik Nagel   11/21/2022  3:45 PM Sean Kylee, DPT [facility]   11/23/2022  3:45 PM Sean Kylee, DPT [facility]   11/29/2022  3:45 PM Yael Sever, PT [facility]   12/1/2022  3:45 PM Mary Grace Núñez Meth, DPT [facility]   12/6/2022  3:45 PM Mary Grace Núñez Meth, DPT [facility]   12/8/2022  3:45 PM Sean Kylee, DPT [facility]   12/13/2022  3:45 PM Sean Kylee, DPT [facility]   12/15/2022  3:45 PM Sean Kylee, DPT [facility]   12/20/2022  3:45 PM Sean Kylee, DPT [facility]   12/22/2022  3:45 PM Sean Kylee, DPT [facility]   12/28/2022  1:00 PM ANA Forrester [facility] BS AMB   12/29/2022  3:45 PM Sean Kylee, DPT [facility]   7/14/2023  9:15 AM [facility] LAB VISIT [facility] BS AMB   7/21/2023  9:40 AM Bolivar Patel MD [facility] BS AMB

## 2022-11-23 ENCOUNTER — HOSPITAL ENCOUNTER (OUTPATIENT)
Dept: PHYSICAL THERAPY | Age: 52
Discharge: HOME | End: 2022-11-23
Payer: COMMERCIAL

## 2022-11-23 PROCEDURE — 97110 THERAPEUTIC EXERCISES: CPT

## 2022-11-23 PROCEDURE — 97112 NEUROMUSCULAR REEDUCATION: CPT

## 2022-11-23 PROCEDURE — 97530 THERAPEUTIC ACTIVITIES: CPT

## 2022-11-23 NOTE — PROGRESS NOTES
PT DAILY TREATMENT NOTE     Patient Name: Shanta Solorio  Date:2022  : 1970  [x]  Patient  Verified  Payor: [employer] / Plan: [employer] [address] / Product Type: PPO /    In time: 3:46P  Out time: 4:26P  Total Treatment Time (min): 40  Visit #: 4 of 10    Medicare/BCBS Only   Total Timed Codes (min):  40 1:1 Treatment Time:  40       Treatment Area: Pain in right knee [M25.561]    SUBJECTIVE  Pain Level (0-10 scale): 1  Any medication changes, allergies to medications, adverse drug reactions, diagnosis change, or new procedure performed?: [x] No    [] Yes (see summary sheet for update)  Subjective functional status/changes:   [] No changes reported  Pt reports doing well with no new concerns. Arrives to session w/o SPC. OBJECTIVE    10 min Therapeutic Exercise:  [x] See flow sheet :     Rationale: increase ROM and increase strength to improve the patient overall muscle endurance and activity tolerance for ADL's and functional tasks.       15 min Therapeutic Activity:  [x]  See flow sheet :     Rationale: increase ROM, increase strength, and improve coordination  to improve the patients ability to return to PLOF     15 min Neuromuscular Re-education:  [x]  See flow sheet :   Rationale: increase strength, improve coordination, and increase proprioception  to improve the patients ability to perform ADL/IADLs with greater ease           With   [x] TE   [x] TA   [x] neuro   [] other:  Patient Education: [x] Review HEP    [] Progressed/Changed HEP based on:   [] positioning   [] body mechanics   [] transfers   [] heat/ice application    [] other: reviewed desired frequency of performed stretches - pt verbally demonstrates understanding      Other Objective/Functional Measures:   Progressed ex program per flow sheet     Pain Level (0-10 scale) post treatment: 2     ASSESSMENT/Changes in Function:   Pt continues to demonstrate steady progress toward remaining LTGs evident by increasing confidence and activity performance/tolerance during treatments. Leaves session w/o symptom flare up. Will continue to progress activity program to target remaining deficits/functional limitations. Patient will continue to benefit from skilled PT services to modify and progress therapeutic interventions, address functional mobility deficits, address ROM deficits, address strength deficits, analyze and address soft tissue restrictions, analyze and cue movement patterns, analyze and modify body mechanics/ergonomics, assess and modify postural abnormalities, address imbalance/dizziness, and instruct in home and community integration to attain remaining goals. [x]  See Plan of Care  []  See progress note/recertification  []  See Discharge Summary         Progress towards goals / Updated goals: 1. Patient will demonstrate ability to ascend/descend 4 (six inch) steps w/ B handrails, w/ reciprocal gait pattern and w/o symptom onset, in order to enter/exit home at [facility]  PN: Progressing: ascends w/ reciprocal gait pattern (8 in step); descends w/ reciprocal gait pattern (6 in step), Supervised (A), right ascending handrail, w/ no symptom increase, hesitation/fear present w/ min decreased right eccentric control upon decent, 11/21/22   2. Patient will demonstrate and report ability to ambulate >1000 ft, Ind w/o AD, on indoor and outdoor suraces to demonstrate a full return to PLOF  PN: SPC, Mod (I) on uneven surfaces; no AD, Mod (I) on level surfaces (short distances secondary to self reported fatigue), 11/15/22  3. Patient will increase FOTO score >/= 60 points to indicate increased functional mobility.      PN: Regressed: 40 points, 11/15/22   Current: ongoing, will reassess at next PN, 11/21/22    PLAN  [x]  Upgrade activities as tolerated     [x]  Continue plan of care  [x]  Update interventions per flow sheet       []  Discharge due to:_  []  Other:      Karen Sparks DPT 11/23/2022  9:34 AM    Future Appointments   Date Time Provider Pratik Nagel   11/29/2022  3:45 PM Suzanna Lisa, DPT [facility]   12/1/2022  3:45 PM Suzanna Lisa, DPT [facility]   12/6/2022  3:45 PM Suzanna Lisa, DPT [facility]   12/8/2022  3:45 PM Que Matos, PTA [facility]   12/13/2022  3:45 PM Suzanna Lisa, DPT [facility]   12/15/2022  3:45 PM Yazoo Lisa, DPT [facility]   12/20/2022  3:45 PM Yazoo Lisa, DPT [facility]   12/22/2022  3:45 PM Suzanna Lisa, DPT [facility]   12/28/2022  1:00 PM ANA Ledezma [facility] BS AMB   12/29/2022  3:45 PM Yazoo Lisa, DPT [facility]   7/14/2023  9:15 AM [facility] LAB VISIT [facility] BS AMB   7/21/2023  9:40 AM Elham White MD [facility] BS AMB

## 2022-11-29 ENCOUNTER — HOSPITAL ENCOUNTER (OUTPATIENT)
Dept: PHYSICAL THERAPY | Age: 52
Discharge: HOME | End: 2022-11-29
Payer: COMMERCIAL

## 2022-11-29 PROCEDURE — 97110 THERAPEUTIC EXERCISES: CPT

## 2022-11-29 PROCEDURE — 97112 NEUROMUSCULAR REEDUCATION: CPT

## 2022-11-29 PROCEDURE — 97530 THERAPEUTIC ACTIVITIES: CPT

## 2022-11-30 NOTE — PROGRESS NOTES
PT DAILY TREATMENT NOTE     Patient Name: Kalpesh Hyman  Date:2022  : 1970  [x]  Patient  Verified  Payor: [employer] / Plan: [employer] [address] / Product Type: PPO /    In time: 3:41P  Out time: 4:21P  Total Treatment Time (min): 40  Visit #: 5 of 10    Medicare/BCBS Only   Total Timed Codes (min):  40 1:1 Treatment Time:  40       Treatment Area: Pain in right knee [M25.561]    SUBJECTIVE  Pain Level (0-10 scale): 1  Any medication changes, allergies to medications, adverse drug reactions, diagnosis change, or new procedure performed?: [x] No    [] Yes (see summary sheet for update)  Subjective functional status/changes:   [] No changes reported  Pt reports attempting to wear shoe with increased heel height yesterday, however bothered right ankle. Overall goal is to return to wearing high heel shoes. OBJECTIVE    10 min Therapeutic Exercise:  [x] See flow sheet :     Rationale: increase ROM and increase strength to improve the patient overall muscle endurance and activity tolerance for ADL's and functional tasks.       15 min Therapeutic Activity:  [x]  See flow sheet :     Rationale: increase ROM, increase strength, and improve coordination  to improve the patients ability to return to PLOF     15 min Neuromuscular Re-education:  [x]  See flow sheet :   Rationale: increase strength, improve coordination, and increase proprioception  to improve the patients ability to perform ADL/IADLs with greater ease           With   [x] TE   [x] TA   [x] neuro   [] other:  Patient Education: [x] Review HEP    [] Progressed/Changed HEP based on:   [] positioning   [] body mechanics   [] transfers   [] heat/ice application    [] other:      Other Objective/Functional Measures:   Progressed ex program per flow sheet     Pain Level (0-10 scale) post treatment: 1    ASSESSMENT/Changes in Function:   Despite subjective report noted above, pt continues to demonstrate steady progress toward remaining LTGs and good tolerance to advancing therex performance during sessions. Leaves today's session w/o symptom flare up. Will continue to progress activity program as able and tolerated. Patient will continue to benefit from skilled PT services to modify and progress therapeutic interventions, address functional mobility deficits, address ROM deficits, address strength deficits, analyze and address soft tissue restrictions, analyze and cue movement patterns, analyze and modify body mechanics/ergonomics, assess and modify postural abnormalities, address imbalance/dizziness, and instruct in home and community integration to attain remaining goals. [x]  See Plan of Care  []  See progress note/recertification  []  See Discharge Summary         Progress towards goals / Updated goals: 1. Patient will demonstrate ability to ascend/descend 4 (six inch) steps w/ B handrails, w/ reciprocal gait pattern and w/o symptom onset, in order to enter/exit home at [facility]  PN: Progressing: ascends w/ reciprocal gait pattern (8 in step); descends w/ reciprocal gait pattern (6 in step), Supervised (A), right ascending handrail, w/ no symptom increase, hesitation/fear present w/ min decreased right eccentric control upon decent, 11/21/22   2. Patient will demonstrate and report ability to ambulate >1000 ft, Ind w/o AD, on indoor and outdoor suraces to demonstrate a full return to PLOF  PN: SPC, Mod (I) on uneven surfaces; no AD, Mod (I) on level surfaces (short distances secondary to self reported fatigue), 11/15/22  3. Patient will increase FOTO score >/= 60 points to indicate increased functional mobility.      PN: Regressed: 40 points, 11/15/22   Current: ongoing, will reassess at next PN, 11/29/22    PLAN  [x]  Upgrade activities as tolerated     [x]  Continue plan of care  [x]  Update interventions per flow sheet       []  Discharge due to:_  []  Other:      Sean Olmedo DPT 11/29/2022  9:34 AM    Future Appointments   Date Time Provider Pratik Nagel   12/1/2022  3:45 PM Foster [place], DPT [facility]   12/6/2022  3:45 PM Foster [place], DPT [facility]   12/8/2022  3:45 PM Foster [place], DPT [facility]   12/13/2022  3:45 PM Foster [place], DPT [facility]   12/15/2022  3:45 PM Foster [place], DPT [facility]   12/20/2022  3:45 PM Foster [place], DPT [facility]   12/22/2022  3:45 PM Foster [place], DPT [facility]   12/28/2022  1:00 PM Luellen Mcburney, PA [facility] BS AMB   12/29/2022  3:45 PM Foster [place], DPT [facility]   7/14/2023  9:15 AM [facility] LAB VISIT [facility] BS AMB   7/21/2023  9:40 AM Edita Lopez MD [facility] BS AMB

## 2022-12-01 ENCOUNTER — APPOINTMENT (OUTPATIENT)
Dept: PHYSICAL THERAPY | Age: 52
End: 2022-12-01
Payer: COMMERCIAL

## 2022-12-06 ENCOUNTER — HOSPITAL ENCOUNTER (OUTPATIENT)
Dept: PHYSICAL THERAPY | Age: 52
Discharge: HOME | End: 2022-12-06
Payer: COMMERCIAL

## 2022-12-06 PROCEDURE — 97110 THERAPEUTIC EXERCISES: CPT

## 2022-12-06 PROCEDURE — 97112 NEUROMUSCULAR REEDUCATION: CPT

## 2022-12-06 PROCEDURE — 97530 THERAPEUTIC ACTIVITIES: CPT

## 2022-12-07 NOTE — PROGRESS NOTES
PT DAILY TREATMENT NOTE     Patient Name: Moy Turner  Date:2022  : 1970  [x]  Patient  Verified  Payor: Darcia Boxer / Plan: [employer] [address] / Product Type: PPO /    In time: 3:45P  Out time: 4:28P  Total Treatment Time (min): 43  Visit #: 6 of 10    Medicare/BCBS Only   Total Timed Codes (min):  43 1:1 Treatment Time:  43       Treatment Area: Pain in right knee [M25.561]    SUBJECTIVE  Pain Level (0-10 scale): 0  Any medication changes, allergies to medications, adverse drug reactions, diagnosis change, or new procedure performed?: [x] No    [] Yes (see summary sheet for update)  Subjective functional status/changes:   [] No changes reported  Pt arrives to session w/ report of no pain. Reports continued limitations w/ transferring to/from ground (to perform gardening activities) and jumping (including recreational activities with family)    OBJECTIVE    13 min Therapeutic Exercise:  [x] See flow sheet :     Rationale: increase ROM and increase strength to improve the patient overall muscle endurance and activity tolerance for ADL's and functional tasks.       15 min Therapeutic Activity:  [x]  See flow sheet :     Rationale: increase ROM, increase strength, and improve coordination  to improve the patients ability to return to PLOF     15 min Neuromuscular Re-education:  [x]  See flow sheet :   Rationale: increase strength, improve coordination, and increase proprioception  to improve the patients ability to perform ADL/IADLs with greater ease           With   [x] TE   [x] TA   [x] neuro   [] other:  Patient Education: [x] Review HEP    [] Progressed/Changed HEP based on:   [] positioning   [] body mechanics   [] transfers   [] heat/ice application    [] other:      Other Objective/Functional Measures:   Progressed ex program per flow sheet - adding floor transfers, sled push/pull     Pain Level (0-10 scale) post treatment: 0    ASSESSMENT/Changes in Function:   Pt performs today's session w/ great tolerance - no pain reported w/ confidence confidence built and progressions toward LTGs demonstrated. Notable muscular fatigue present w/ SL stability activities. Will continue to progress activity program as able and tolerated. Patient will continue to benefit from skilled PT services to modify and progress therapeutic interventions, address functional mobility deficits, address ROM deficits, address strength deficits, analyze and address soft tissue restrictions, analyze and cue movement patterns, analyze and modify body mechanics/ergonomics, assess and modify postural abnormalities, address imbalance/dizziness, and instruct in home and community integration to attain remaining goals. [x]  See Plan of Care  []  See progress note/recertification  []  See Discharge Summary         Progress towards goals / Updated goals: 1. Patient will demonstrate ability to ascend/descend 4 (six inch) steps w/ B handrails, w/ reciprocal gait pattern and w/o symptom onset, in order to enter/exit home at [facility]  PN: Progressing: ascends w/ reciprocal gait pattern (8 in step); descends w/ reciprocal gait pattern (6 in step), Supervised (A), right ascending handrail, w/ no symptom increase, hesitation/fear present w/ min decreased right eccentric control upon decent, 11/21/22   2. Patient will demonstrate and report ability to ambulate >1000 ft, Ind w/o AD, on indoor and outdoor suraces to demonstrate a full return to PLOF  PN: SPC, Mod (I) on uneven surfaces; no AD, Mod (I) on level surfaces (short distances secondary to self reported fatigue), 11/15/22\  Current: Met: No AD use at this time, 12/8/22  3. Patient will increase FOTO score >/= 60 points to indicate increased functional mobility.      PN: Regressed: 40 points, 11/15/22   Current: ongoing, will reassess at next PN, 11/29/22    PLAN  [x]  Upgrade activities as tolerated     [x]  Continue plan of care  [x]  Update interventions per flow sheet       []  Discharge due to:_  []  Other:      Sean Olmedo, DPBERNIE 12/6/2022  9:34 AM    Future Appointments   Date Time Provider Pratik Shona   12/8/2022  3:45 PM Jimbo Junk, DPT [facility]   12/13/2022  3:45 PM Jimbo Junk, DPT [facility]   12/15/2022  3:45 PM Enderlin Junk, DPT [facility]   12/20/2022  3:45 PM Enderlin Junk, DPT [facility]   12/22/2022  3:45 PM Jimbo Junk, DPT [facility]   12/28/2022  1:00 PM ANA Vicente [facility] BS AMB   12/29/2022  3:45 PM Enderlin Junk, DPT [facility]   7/14/2023  9:15 AM [facility] LAB VISIT [facility] BS AMB   7/21/2023  9:40 AM Lupis Boyd MD [facility] BS AMB

## 2022-12-08 ENCOUNTER — HOSPITAL ENCOUNTER (OUTPATIENT)
Dept: PHYSICAL THERAPY | Age: 52
Discharge: HOME | End: 2022-12-08
Payer: COMMERCIAL

## 2022-12-08 PROCEDURE — 97110 THERAPEUTIC EXERCISES: CPT

## 2022-12-08 PROCEDURE — 97530 THERAPEUTIC ACTIVITIES: CPT

## 2022-12-08 PROCEDURE — 97112 NEUROMUSCULAR REEDUCATION: CPT

## 2022-12-09 NOTE — PROGRESS NOTES
PT DAILY TREATMENT NOTE     Patient Name: Elvira Hargrove  Date:2022  : 1970  [x]  Patient  Verified  Payor: Bam Perez / [facility] [address] / Product Type: PPO /    In time: 3:44P  Out time: 4:27P  Total Treatment Time (min): 43  Visit #: 7 of 10    Medicare/BCBS Only   Total Timed Codes (min):  43 1:1 Treatment Time:  43       Treatment Area: Pain in right knee [M25.561]    SUBJECTIVE  Pain Level (0-10 scale): 1  Any medication changes, allergies to medications, adverse drug reactions, diagnosis change, or new procedure performed?: [x] No    [] Yes (see summary sheet for update)  Subjective functional status/changes:   [] No changes reported  Pt states experiencing some discomfort at right inferior knee following previous session, however symptoms subsided in the days following     OBJECTIVE    13 min Therapeutic Exercise:  [x] See flow sheet :     Rationale: increase ROM and increase strength to improve the patient overall muscle endurance and activity tolerance for ADL's and functional tasks.       15 min Therapeutic Activity:  [x]  See flow sheet :     Rationale: increase ROM, increase strength, and improve coordination  to improve the patients ability to return to PLOF     15 min Neuromuscular Re-education:  [x]  See flow sheet :   Rationale: increase strength, improve coordination, and increase proprioception  to improve the patients ability to perform ADL/IADLs with greater ease           With   [x] TE   [x] TA   [x] neuro   [] other:  Patient Education: [x] Review HEP    [] Progressed/Changed HEP based on:   [] positioning   [] body mechanics   [] transfers   [] heat/ice application    [x] other: Pt ed on expected responses following progression activity program, including floor transfers      Other Objective/Functional Measures:   Progressed ex program per flow sheet      Pain Level (0-10 scale) post treatment: 0    ASSESSMENT/Changes in Function:   Although notable muscular fatigue present w/ SL stability activities continues, pt w/ continued great tolerance to progression of activity program demonstrating ability to complete w/o symptom flare up. Formal reassessment to be completed at next scheduled follow up. Patient will continue to benefit from skilled PT services to modify and progress therapeutic interventions, address functional mobility deficits, address ROM deficits, address strength deficits, analyze and address soft tissue restrictions, analyze and cue movement patterns, analyze and modify body mechanics/ergonomics, assess and modify postural abnormalities, address imbalance/dizziness, and instruct in home and community integration to attain remaining goals. [x]  See Plan of Care  []  See progress note/recertification  []  See Discharge Summary         Progress towards goals / Updated goals: 1. Patient will demonstrate ability to ascend/descend 4 (six inch) steps w/ B handrails, w/ reciprocal gait pattern and w/o symptom onset, in order to enter/exit home at PLOF  PN: Progressing: ascends w/ reciprocal gait pattern (8 in step); descends w/ reciprocal gait pattern (6 in step), Supervised (A), right ascending handrail, w/ no symptom increase, hesitation/fear present w/ min decreased right eccentric control upon decent, 11/21/22   2. Patient will demonstrate and report ability to ambulate >1000 ft, Ind w/o AD, on indoor and outdoor suraces to demonstrate a full return to PLOF  PN: SPC, Mod (I) on uneven surfaces; no AD, Mod (I) on level surfaces (short distances secondary to self reported fatigue), 11/15/22\  Current: Met: No AD use at this time, 12/8/22  3. Patient will increase FOTO score >/= 60 points to indicate increased functional mobility.      PN: Regressed: 40 points, 11/15/22   Current: ongoing, will reassess at next PN, 11/29/22    PLAN  [x]  Upgrade activities as tolerated     [x]  Continue plan of care  [x]  Update interventions per flow sheet       []  Discharge due to:_  []  Other:      Jemal Snow, BRAIN 12/8/2022  9:34 AM    Future Appointments   Date Time Provider Pratik Shona   12/13/2022  3:45 PM Katerin Pill, DPT [facility]   12/15/2022  3:45 PM Katerin Pill, DPT [facility]   12/20/2022  3:45 PM Katerin Pill, DPT [facility]   12/22/2022  3:45 PM Katerin Pill, DPT [facility]   12/28/2022  1:00 PM ANA [facility] BS AMB   12/29/2022  3:45 PM Katerin Pill, DPT [facility]   7/14/2023  9:15 AM [facility] LAB VISIT [facility] BS AMB   7/21/2023  9:40 AM Priti Madrid MD [facility] BS AMB

## 2022-12-12 ENCOUNTER — TELEPHONE (OUTPATIENT)
Dept: PHYSICAL THERAPY | Age: 52
End: 2022-12-12

## 2022-12-12 ENCOUNTER — HOSPITAL ENCOUNTER (OUTPATIENT)
Dept: PHYSICAL THERAPY | Age: 52
Discharge: HOME | End: 2022-12-12
Payer: COMMERCIAL

## 2022-12-12 PROCEDURE — 97530 THERAPEUTIC ACTIVITIES: CPT

## 2022-12-12 PROCEDURE — 97110 THERAPEUTIC EXERCISES: CPT

## 2022-12-12 PROCEDURE — 97112 NEUROMUSCULAR REEDUCATION: CPT

## 2022-12-12 NOTE — PROGRESS NOTES
[facility] [address] [id number] [facility], [address] [place] [address], [address]  [phone] fax    Physician Update  [x] Progress Note  [] Discharge Summary  Patient name: Checo Duke Start of Care: 2022   Referral source: Sunitha ForbesAlexma : 1970   Medical/Treatment Diagnosis: Pain in right knee [M25.561]  Payor: BLUE CROSS / [facility] [address] / Product Type: PPO /  Onset Date:22     Prior Hospitalization: see medical history Provider#: [id number]   Medications: Verified on Patient Summary List    Comorbidities: patient reports: thyroid problems, tobacco use  Prior Level of Function: I with ADLs, working desk job full time, playing with grandkids without difficulty. Visits from Start of Care: 19  Missed Visits: 0    Status at Evaluation:   Patient is a pleasant middle aged adult female with Right knee pain s/p traumatic fall on [place] on 22 that resulted in Need for patella reconstructive ORIF on 22. Pain appears to be peripheral neuropathic in nature due to referred symptoms. Treatment will follow surgeon protocol closely. Will contact referring provided for specifics     Status at Last Progress Note:  Pt reports a 70-75% improvement in current condition since beginning skilled PT services. Notes improvements with pain intensity and ability to ambulate, bend and go up/down stairs with greater confidence and independence. Remaining limitations reported include ascending/descending stairs with reciprocal gait pattern, ambulating at PLOF and being able to sit like she used to. Objectively, pt demonstrates continued improvements in overall functional status, AROM, strength and stability. Continues to be hesitant initiating new activities during sessions, however with continued compliance with in person sessions and prescribed HEP, activity program will steadily progress - leading to pt reaching remaining LTGs. Patient will continue to benefit from skilled PT services to modify and progress therapeutic interventions, address functional mobility deficits, address ROM deficits, address strength deficits, analyze and address soft tissue restrictions, analyze and cue movement patterns, analyze and modify body mechanics/ergonomics, assess and modify postural abnormalities, address imbalance/dizziness, and instruct in home and community integration to attain remaining goals. Progress towards Goals: 1. Patient will demonstrate ability to ascend/descend 4 (six inch) steps w/ B handrails, w/ reciprocal gait pattern and w/o symptom onset, in order to enter/exit home at PLOF  PN: Progressing: ascends w/ reciprocal gait pattern (8 in step); descends w/ reciprocal gait pattern (6 in step), Supervised (A), right ascending handrail, w/ no symptom increase, hesitation/fear present w/ min decreased right eccentric control upon decent, 11/21/22   Current: Met: ascends/descends 8 in steps w/ reciprocal gait pattern and w/o symptom onset, 12/12/22     2. Patient will demonstrate and report ability to ambulate >1000 ft, Ind w/o AD, on indoor and outdoor suraces to demonstrate a full return to PLOF  PN: SPC, Mod (I) on uneven surfaces; no AD, Mod (I) on level surfaces (short distances secondary to self reported fatigue), 11/15/22\  Current: Met: No AD use at this time, 12/12/22     3. Patient will increase FOTO score >/= 60 points to indicate increased functional mobility. PN: Regressed: 40 points, 11/15/22   Current: Met: 64 pts, 12/12/22    Current Status:  Pt reports a 90% improvement in current condition since beginning skilled PT services. Notes improvements in agility, walking up stairs, bathing/dressing, performing household chores and pain intensity. Remaining limitations include full confidence in performance of recreational activities and hobbies (in which include putting pressure through knees and jumping).  Objectively, pt demonstrates significant improvements in overall functional status/performance. Would benefit from continued participation at reduced frequency this reporting period in order to target remaining functional limitations/deficits and prepare for upcoming discharge. Patient will continue to benefit from skilled PT services to modify and progress therapeutic interventions, address functional mobility deficits, address ROM deficits, address strength deficits, analyze and address soft tissue restrictions, analyze and cue movement patterns, analyze and modify body mechanics/ergonomics, assess and modify postural abnormalities, address imbalance/dizziness, and instruct in home and community integration to attain remaining goals. Goals: to be achieved in 2 weeks: 1. Patient will report a >/= 95% improvement in current condition in order to demonstrate improved confidence in performing recreational activities and a return to PLOF  PN: 90% reported w/ not fully confident in performing all prior activities/hobbies      ASSESSMENT/RECOMMENDATIONS:  [x]Continue therapy per initial plan/protocol at a frequency of  1-2x per week for 2 weeks  []Continue therapy with the following recommended changes:_____________________      _____________________________________________________________________  []Discontinue therapy progressing towards or have reached established goals  []Discontinue therapy due to lack of appreciable progress towards goals  []Discontinue therapy due to lack of attendance or compliance  []Await Physician's recommendations/decisions regarding therapy  []Other:________________________________________________________________    Thank you for this referral. Hemanth Hodges DPT 12/12/2022 6:07 PM  NOTE TO PHYSICIAN:  PLEASE COMPLETE THE ORDERS BELOW AND   FAX TO [facility]: [phone]  If you are unable to process this request in 24 hours please contact our office: [phone]    I have read the above report and request that my patient continue as recommended. I have read the above report and request that my patient continue therapy with the following changes/special instructions:_____________________________________  I have read the above report and request that my patient be discharged from therapy.     [de-identified] Signature:____________Date:_________TIME:________     ANA Ledezma  ** Signature, Date and Time must be completed for valid certification **

## 2022-12-12 NOTE — PROGRESS NOTES
PT DAILY TREATMENT NOTE     Patient Name: Wilma Kang  KZPS:  : 1970  [x]  Patient  Verified  Payor: BLUE CROSS / Plan: [facility] REYES [employer] [address] / Product Type: PPO /    In time: 3:45P  Out time: 4:30P  Total Treatment Time (min): 45  Visit #: 8 of 10    Medicare/BCBS Only   Total Timed Codes (min): 45 1:1 Treatment Time:  45       Treatment Area: Pain in right knee [M25.561]    SUBJECTIVE  Pain Level (0-10 scale): 0-1  Any medication changes, allergies to medications, adverse drug reactions, diagnosis change, or new procedure performed?: [x] No    [] Yes (see summary sheet for update)  Subjective functional status/changes:   [] No changes reported  Pt reports a 90% improvement in current condition since beginning skilled PT services. Notes improvements in agility, walking up stairs, bathing/dressing, performing household chores and pain intensity. Remaining limitations include full confidence in performance of recreational activities and hobbies (in which include putting pressure through knees and jumping). States she would like to continue with PT in order to target remaining limitations. OBJECTIVE    15 min Therapeutic Exercise:  [x] See flow sheet :     Rationale: increase ROM and increase strength to improve the patient overall muscle endurance and activity tolerance for ADL's and functional tasks.       20 min Therapeutic Activity:  [x]  See flow sheet : FOTO/Goal Reassessment/ Stairs/Step Ups    Rationale: increase ROM, increase strength, and improve coordination  to improve the patients ability to return to PLOF     10 min Neuromuscular Re-education:  [x]  See flow sheet :   Rationale: increase strength, improve coordination, and increase proprioception  to improve the patients ability to perform ADL/IADLs with greater ease           With   [x] TE   [x] TA   [x] neuro   [] other:  Patient Education: [x] Review HEP    [] Progressed/Changed HEP based on:   [] positioning   [] body mechanics   [] transfers   [] heat/ice application    [] other:     Other Objective/Functional Measures:   See Goals Below     Pain Level (0-10 scale) post treatment: 0-1     ASSESSMENT/Changes in Function:   Pt reports a 90% improvement in current condition since beginning skilled PT services. Notes improvements in agility, walking up stairs, bathing/dressing, performing household chores and pain intensity. Remaining limitations include full confidence in performance of recreational activities and hobbies (in which include putting pressure through knees and jumping). Objectively, pt demonstrates significant improvements in overall functional status/performance. Would benefit from continued participation at reduced frequency this reporting period in order to target remaining functional limitations/deficits and prepare for upcoming discharge. Patient will continue to benefit from skilled PT services to modify and progress therapeutic interventions, address functional mobility deficits, address ROM deficits, address strength deficits, analyze and address soft tissue restrictions, analyze and cue movement patterns, analyze and modify body mechanics/ergonomics, assess and modify postural abnormalities, address imbalance/dizziness, and instruct in home and community integration to attain remaining goals. []  See Plan of Care  [x]  See progress note/recertification  []  See Discharge Summary         Progress towards goals / Updated goals: 1. Patient will demonstrate ability to ascend/descend 4 (six inch) steps w/ B handrails, w/ reciprocal gait pattern and w/o symptom onset, in order to enter/exit home at [facility]  PN: Progressing: ascends w/ reciprocal gait pattern (8 in step); descends w/ reciprocal gait pattern (6 in step), Supervised (A), right ascending handrail, w/ no symptom increase, hesitation/fear present w/ min decreased right eccentric control upon decent, 11/21/22   Current: Met: ascends/descends 8 in steps w/ reciprocal gait pattern and w/o symptom onset, 12/12/22    2. Patient will demonstrate and report ability to ambulate >1000 ft, Ind w/o AD, on indoor and outdoor suraces to demonstrate a full return to PLOF  PN: SPC, Mod (I) on uneven surfaces; no AD, Mod (I) on level surfaces (short distances secondary to self reported fatigue), 11/15/22\  Current: Met: No AD use at this time, 12/12/22    3. Patient will increase FOTO score >/= 60 points to indicate increased functional mobility.      PN: Regressed: 40 points, 11/15/22   Current: Met: 64 pts, 12/12/22    PLAN  [x]  Upgrade activities as tolerated     [x]  Continue plan of care  [x]  Update interventions per flow sheet       []  Discharge due to:_  []  Other:      Jazmin Patterson DPT 12/12/2022  9:34 AM    Future Appointments   Date Time Provider Pratik Nagel   12/12/2022  3:45 PM Del Alter, DPT [facility]   12/15/2022  3:45 PM Del Alter, DPT [facility]   12/20/2022  3:45 PM Del Alter, DPT [facility]   12/22/2022  3:45 PM Del Alter, DPT [facility]   12/28/2022  1:00 PM ANA Bullock [facility] BS AMB   12/29/2022  3:45 PM Del Alter, DPT [facility]   7/14/2023  9:15 AM [facility] LAB VISIT [facility] BS AMB   7/21/2023  9:40 AM Richelle Feliz MD [facility] BS AMB

## 2022-12-13 ENCOUNTER — APPOINTMENT (OUTPATIENT)
Dept: PHYSICAL THERAPY | Age: 52
End: 2022-12-13
Payer: COMMERCIAL

## 2022-12-13 DIAGNOSIS — E03.9 ACQUIRED HYPOTHYROIDISM: ICD-10-CM

## 2022-12-13 RX ORDER — LEVOTHYROXINE SODIUM 125 UG/1
TABLET ORAL
Qty: 90 TABLET | Refills: 1 | Status: SHIPPED | OUTPATIENT
Start: 2022-12-13

## 2022-12-13 RX ORDER — LEVOTHYROXINE SODIUM 125 UG/1
TABLET ORAL
Qty: 90 TABLET | Refills: 1 | Status: CANCELLED | OUTPATIENT
Start: 2022-12-13

## 2022-12-13 RX ORDER — DOXYCYCLINE 100 MG/1
100 CAPSULE ORAL 2 TIMES DAILY
Qty: 180 CAPSULE | Refills: 2 | Status: SHIPPED | OUTPATIENT
Start: 2022-12-13

## 2022-12-15 ENCOUNTER — HOSPITAL ENCOUNTER (OUTPATIENT)
Dept: PHYSICAL THERAPY | Age: 52
Discharge: HOME | End: 2022-12-15
Payer: COMMERCIAL

## 2022-12-15 PROCEDURE — 97530 THERAPEUTIC ACTIVITIES: CPT

## 2022-12-15 PROCEDURE — 97112 NEUROMUSCULAR REEDUCATION: CPT

## 2022-12-15 PROCEDURE — 97110 THERAPEUTIC EXERCISES: CPT

## 2022-12-16 NOTE — PROGRESS NOTES
PT DAILY TREATMENT NOTE     Patient Name: Balwinder Thomas  EDSR:  : 1970  [x]  Patient  Verified  Payor: BLUE CROSS / [facility] [employer] [address] / Product Type: PPO /    In time: 3:45P  Out time: 4:25P  Total Treatment Time (min): 40  Visit #: 1 of 4    Medicare/BCBS Only   Total Timed Codes (min):  40 1:1 Treatment Time:  40       Treatment Area: Pain in right knee [M25.561]    SUBJECTIVE  Pain Level (0-10 scale): 1  Any medication changes, allergies to medications, adverse drug reactions, diagnosis change, or new procedure performed?: [x] No    [] Yes (see summary sheet for update)  Subjective functional status/changes:   [] No changes reported  Pt reports doing well today with no new concerns. States she wore boots (in which has raised heel) following previous session w/ no issues, however attempted putting pressure through knees on her bed stating she didn't like. Reports that she is weaning of gabapentin medication and states thinking this may have something to do with the sensation she experiences while putting weight through right knee. OBJECTIVE    10 min Therapeutic Exercise:  [x] See flow sheet :     Rationale: increase ROM and increase strength to improve the patient overall muscle endurance and activity tolerance for ADL's and functional tasks.       15 min Therapeutic Activity:  [x]  See flow sheet :     Rationale: increase ROM, increase strength, and improve coordination  to improve the patients ability to return to PLOF     15 min Neuromuscular Re-education:  [x]  See flow sheet :   Rationale: increase strength, improve coordination, and increase proprioception  to improve the patients ability to perform ADL/IADLs with greater ease           With   [x] TE   [x] TA   [x] neuro   [] other:  Patient Education: [x] Review HEP    [] Progressed/Changed HEP based on:   [] positioning   [] body mechanics   [] transfers   [] heat/ice application    [] other: Other Objective/Functional Measures:   Progressed ex program per flow sheet      Pain Level (0-10 scale) post treatment: 0    ASSESSMENT/Changes in Function:   Pt continues to demonstrate improved confidence and performance of consistently progressing activity program. Completes today's session w/o an increase in pain levels. Anticipate pt discharging upon conclusion of reporting period. Patient will continue to benefit from skilled PT services to modify and progress therapeutic interventions, address functional mobility deficits, address ROM deficits, address strength deficits, analyze and address soft tissue restrictions, analyze and cue movement patterns, analyze and modify body mechanics/ergonomics, assess and modify postural abnormalities, address imbalance/dizziness, and instruct in home and community integration to attain remaining goals. [x]  See Plan of Care  []  See progress note/recertification  []  See Discharge Summary         Progress towards goals / Updated goals: 1. Patient will report a >/= 95% improvement in current condition in order to demonstrate improved confidence in performing recreational activities and a return to PLOF  PN: 90% reported w/ not fully confident in performing all prior activities/hobbies    PLAN  [x]  Upgrade activities as tolerated     [x]  Continue plan of care  [x]  Update interventions per flow sheet       []  Discharge due to:_  []  Other:      Sean Olmedo DPT 12/15/2022  9:34 AM    Future Appointments   Date Time Provider Pratik Nagel   12/20/2022  3:45 PM Jimbo Espino DPT [facility]   12/22/2022  3:45 PM Dinah Read PT [facility]   12/28/2022  1:00 PM ANA Vicente [facility] AMB   12/29/2022  3:45 PM Jimbo Espino DPT [facility]   7/14/2023  9:15 AM IO LAB VISIT [facility] AMB   7/21/2023  9:40 AM Lupis Boyd MD [facility] BS AMB

## 2022-12-20 ENCOUNTER — HOSPITAL ENCOUNTER (OUTPATIENT)
Dept: PHYSICAL THERAPY | Age: 52
Discharge: HOME | End: 2022-12-20
Payer: COMMERCIAL

## 2022-12-20 PROCEDURE — 97110 THERAPEUTIC EXERCISES: CPT

## 2022-12-20 PROCEDURE — 97112 NEUROMUSCULAR REEDUCATION: CPT

## 2022-12-20 PROCEDURE — 97530 THERAPEUTIC ACTIVITIES: CPT

## 2022-12-20 NOTE — PROGRESS NOTES
PT DAILY TREATMENT NOTE     Patient Name: Elroy Watt  DGPX:  : 1970  [x]  Patient  Verified  Payor: BLUE CROSS / Plan: [employer] [address] / Product Type: PPO /    In time: 3:48P  Out time: 4:28P  Total Treatment Time (min): 40  Visit #: 2 of 4    Medicare/BCBS Only   Total Timed Codes (min):  40 1:1 Treatment Time:  40       Treatment Area: Pain in right knee [M25.561]    SUBJECTIVE  Pain Level (0-10 scale): 0-1  Any medication changes, allergies to medications, adverse drug reactions, diagnosis change, or new procedure performed?: [x] No    [] Yes (see summary sheet for update)  Subjective functional status/changes:   [] No changes reported  Pt reports overall doing well, however had to return to taking gabapentin secondary to increased discomfort localized to right knee, described as pins and needle type feeling. OBJECTIVE    10 min Therapeutic Exercise:  [x] See flow sheet :     Rationale: increase ROM and increase strength to improve the patient overall muscle endurance and activity tolerance for ADL's and functional tasks.       15 min Therapeutic Activity:  [x]  See flow sheet :     Rationale: increase ROM, increase strength, and improve coordination  to improve the patients ability to return to PLOF     15 min Neuromuscular Re-education:  [x]  See flow sheet :   Rationale: increase strength, improve coordination, and increase proprioception  to improve the patients ability to perform ADL/IADLs with greater ease           With   [x] TE   [x] TA   [x] neuro   [] other:  Patient Education: [x] Review HEP    [] Progressed/Changed HEP based on:   [] positioning   [] body mechanics   [] transfers   [] heat/ice application    [] other:       Other Objective/Functional Measures:   Progressed ex program per flow sheet      Pain Level (0-10 scale) post treatment: 0-1    ASSESSMENT/Changes in Function:   Pt continues to demonstrate improved confidence and performance of consistently progressing activity program. Completes today's session w/o an increase in pain levels. Anticipate pt discharging upon conclusion of reporting period. Patient will continue to benefit from skilled PT services to modify and progress therapeutic interventions, address functional mobility deficits, address ROM deficits, address strength deficits, analyze and address soft tissue restrictions, analyze and cue movement patterns, analyze and modify body mechanics/ergonomics, assess and modify postural abnormalities, address imbalance/dizziness, and instruct in home and community integration to attain remaining goals. [x]  See Plan of Care  []  See progress note/recertification  []  See Discharge Summary         Progress towards goals / Updated goals: 1. Patient will report a >/= 95% improvement in current condition in order to demonstrate improved confidence in performing recreational activities and a return to PLOF  PN: 90% reported w/ not fully confident in performing all prior activities/hobbies  Current: ongoing, will continue to reassess throughout remaining reporting period, 12/20/22    PLAN  [x]  Upgrade activities as tolerated     [x]  Continue plan of care  [x]  Update interventions per flow sheet       []  Discharge due to:_  []  Other:      Bandar Saunders DPT 12/20/2022  9:34 AM    Future Appointments   Date Time Provider Pratik Shona   12/22/2022  3:45 PM Reg Yoder, PT [facility]   12/28/2022  1:00 PM ANA Kendrick [facility] BS AMB   12/29/2022  3:45 PM Queenie Meléndez DPT [facility]   7/14/2023  9:15 AM [facility] LAB VISIT [facility] BS AMB   7/21/2023  9:40 AM Lesley Yoo MD [facility] BS AMB

## 2022-12-22 ENCOUNTER — HOSPITAL ENCOUNTER (OUTPATIENT)
Dept: PHYSICAL THERAPY | Age: 52
Discharge: HOME | End: 2022-12-22
Payer: COMMERCIAL

## 2022-12-22 PROCEDURE — 97530 THERAPEUTIC ACTIVITIES: CPT

## 2022-12-22 PROCEDURE — 97110 THERAPEUTIC EXERCISES: CPT

## 2022-12-22 NOTE — PROGRESS NOTES
PT DAILY TREATMENT NOTE     Patient Name: Tresa Boas  OBFN:  : 1970  [x]  Patient  Verified  Payor: BLUE CROSS / Plan: [facility] [employer] [address] / Product Type: PPO /    In time: 3:46P  Out time: 4:10P  Total Treatment Time (min): 24  Visit #: 3 of 4    Medicare/BCBS Only   Total Timed Codes (min):  24 1:1 Treatment Time:  24       Treatment Area: Pain in right knee [M25.561]    SUBJECTIVE  Pain Level (0-10 scale): 0-1  Any medication changes, allergies to medications, adverse drug reactions, diagnosis change, or new procedure performed?: [x] No    [] Yes (see summary sheet for update)  Subjective functional status/changes:   [] No changes reported   Patient reports a 100% improvement in current condition, with no functional limitations at this time. Would like to be discharged from services to home program.      OBJECTIVE    9 min Therapeutic Exercise:  [x] See flow sheet :     Rationale: increase ROM and increase strength to improve the patient overall muscle endurance and activity tolerance for ADL's and functional tasks. 15 min Therapeutic Activity:  [x]  See flow sheet : FOTO/Goal Reassessment/ Stairs/Step Ups    Rationale: increase ROM, increase strength, and improve coordination  to improve the patients ability to return to PLOF          With   [x] TE   [x] TA   [] neuro   [] other:  Patient Education: [x] Review HEP    [] Progressed/Changed HEP based on:   [] positioning   [] body mechanics   [] transfers   [] heat/ice application    [x] other:  updated HEP w/ new print out and TB provided      Other Objective/Functional Measures:   See goals below     Pain Level (0-10 scale) post treatment: 0-1    ASSESSMENT/Changes in Function:   Patient reports a 100% improvement in current condition, with no functional limitations at this time.  Objectively, pt demonstrates significant improvements in overall functional status, confidence, AROM, strength/stability and balance since IE - meeting all LTGs. Pt to be discharged to updated [facility] at this time. []  See Plan of Care  []  See progress note/recertification  [x]  See Discharge Summary         Progress towards goals / Updated goals: 1. Patient will report a >/= 95% improvement in current condition in order to demonstrate improved confidence in performing recreational activities and a return to PLOF  PN: 90% reported w/ not fully confident in performing all prior activities/hobbies  Current: Met: reports 100% improvement, 12/22/22    PLAN  []  Upgrade activities as tolerated     []  Continue plan of care  []  Update interventions per flow sheet       [x]  Discharge due to:  All LTGs met with no functional limitations   []  Other:      Blanca Floyd DPT 12/22/2022  9:34 AM    Future Appointments   Date Time Provider Pratik Nagel   12/28/2022  1:00 PM Marilyn Perez [facility] BS AMB   12/29/2022  3:45 PM Brigitte Bernstein DPT [facility]   7/14/2023  9:15 AM [facility] LAB VISIT [facility] BS AMB   7/21/2023  9:40 AM Maggie Be MD [facility] BS AMB

## 2022-12-23 NOTE — PROGRESS NOTES
[facility]      [address], [facility] [facility], [address] [place], [place] [address] [address] [phone] fax    Discharge Summary    Patient name: Janie Bailey     Start of Care: 2022  Referral source: Marilyn Mills    : 1970  Medical/Treatment Diagnosis: Pain in right knee [M25.561]  Payor: BLUE CROSS / Plan: [facility] REYES [facility] [address] / Product Type: PPO /        Onset Date:22  Prior Hospitalization: see medical history   Provider#: [id number]  Comorbidities: patient reports: thyroid problems, tobacco use  Prior Level of Function:I with ADLs, working desk job full time, playing with grandkids without difficulty. Medications: Verified on Patient Summary List    Visits from Start of Care: 22    Missed Visits: 0  Reporting Period : 2022 to 2022      Summary of Care:  Patient reports a 100% improvement in current condition, with no functional limitations at this time. Objectively, pt demonstrates significant improvements in overall functional status, confidence, AROM, strength/stability and balance since IE - meeting all LTGs. Pt to be discharged to [facility] at this time. Progress towards goals: 1. Patient will report a >/= 95% improvement in current condition in order to demonstrate improved confidence in performing recreational activities and a return to PLOF  PN: 90% reported w/ not fully confident in performing all prior activities/hobbies  Current: Met: reports 100% improvement, 22      ASSESSMENT/RECOMMENDATIONS:  [x]Discontinue therapy progressing towards or have reached established goals  []Discontinue therapy due to lack of appreciable progress towards goals  []Discontinue therapy due to lack of attendance or compliance  []Other:     Thank you for this referral.     Jackelin Faith, BRAIN 2022 5:58 PM

## 2022-12-28 ENCOUNTER — OFFICE VISIT (OUTPATIENT)
Dept: ORTHOPEDIC SURGERY | Age: 52
End: 2022-12-28
Payer: COMMERCIAL

## 2022-12-28 VITALS — BODY MASS INDEX: 25.84 KG/M2 | WEIGHT: 160.8 LBS | HEIGHT: 66 IN

## 2022-12-28 DIAGNOSIS — S82.001D CLOSED DISPLACED FRACTURE OF RIGHT PATELLA WITH ROUTINE HEALING, UNSPECIFIED FRACTURE MORPHOLOGY, SUBSEQUENT ENCOUNTER: Primary | ICD-10-CM

## 2022-12-28 PROCEDURE — 99213 OFFICE O/P EST LOW 20 MIN: CPT | Performed by: ORTHOPAEDIC SURGERY

## 2022-12-28 RX ORDER — GABAPENTIN 300 MG/1
300 CAPSULE ORAL 2 TIMES DAILY
Qty: 60 CAPSULE | Refills: 0 | Status: SHIPPED | OUTPATIENT
Start: 2022-12-28

## 2022-12-28 RX ORDER — GABAPENTIN 300 MG/1
300 CAPSULE ORAL 2 TIMES DAILY
Qty: 60 CAPSULE | Refills: 0 | Status: CANCELLED | OUTPATIENT
Start: 2022-12-28

## 2022-12-28 NOTE — PROGRESS NOTES
Malia Campbell  1970   No chief complaint on file. HISTORY OF PRESENT ILLNESS  Malia Campbell is a 47 y.o. female who presents today for reevaluation of right knee. Patient rates pain as 0/10 today. She is s/p ORIF right patella fracture on 9/12/2022. Was last seen in the office by ANA Denise on 11/16/2022. Overall doing well. Finished with PT last week. Having no pain and has resumed with most of her normal ADLs. She has been trying to taper down on using the Gabapentin, is still taking it twice a day on occasion as needed. Patient denies any fever, chills, chest pain, shortness of breath or calf pain. The remainder of the review of systems is negative. There are no new illness or injuries to report since last seen in the office. There are no changes to medications, allergies, family or social history. PHYSICAL EXAM:   Visit Vitals  Ht 5' 6\" (1.676 m)   Wt 160 lb 12.8 oz (72.9 kg)   LMP 08/10/2013   BMI 25.95 kg/m²     The patient is a well-developed, well-nourished female   in no acute distress. The patient is alert and oriented times three. The patient is alert and oriented times three. Mood and affect are normal.  LYMPHATIC: lymph nodes are not enlarged and are within normal limits  SKIN: normal in color and non tender to palpation. There are no bruises or abrasions noted. NEUROLOGICAL: Motor sensory exam is within normal limits. Reflexes are equal bilaterally.  There is normal sensation to pinprick and light touch  MUSCULOSKELETAL:  Examination Right knee   Skin Intact   Range of motion 0-130   Effusion -   Medial joint line tenderness -   Lateral joint line tenderness -   Tenderness Pes Bursa -   Tenderness insertion MCL -   Tenderness insertion LCL -   Rosss -   Patella crepitus -   Patella grind -   Lachman -   Pivot shift -   Anterior drawer -   Posterior drawer -   Varus stress -   Valgus stress -   Neurovascular Intact   Calf Swelling and Tenderness to Palpation -   Alton's Test -   Hamstring Cord Tightness -     IMAGING: None    IMPRESSION:      ICD-10-CM ICD-9-CM    1. Closed displaced fracture of right patella with routine healing, unspecified fracture morphology, subsequent encounter  S82.001D V54.16            PLAN:   1. Pt presents s/p ORIF right patella fracture on 9/12/2022. Overall doing well postoperatively. Was instructed to continue tapering off of Gabapentin use, was provided with one more refill in the office today. Stressed no increases in pain or swelling, use pain as her guide with her activities. Risk factors include: htn  2. No ultrasound exam indicated today  3. No cortisone injection indicated today   4. No Physical/Occupational Therapy indicated today  5. No diagnostic test indicated today:   6. No durable medical equipment indicated today  7. No referral indicated today   8. Yes medications indicated today: GABAPENTIN  9. No Narcotic indicated today      RTC prn      Scribed by [address]) as dictated by Brandin Tobin MD    I, Dr. Brandin Tobin, confirm that all documentation is accurate.     Brandin Tobin M.D.   [facility] [employer] [facility]

## 2022-12-29 ENCOUNTER — APPOINTMENT (OUTPATIENT)
Dept: PHYSICAL THERAPY | Age: 52
End: 2022-12-29
Payer: COMMERCIAL

## 2023-02-03 DIAGNOSIS — E03.9 ACQUIRED HYPOTHYROIDISM: Primary | ICD-10-CM

## 2023-02-03 DIAGNOSIS — K76.0 NAFLD (NONALCOHOLIC FATTY LIVER DISEASE): ICD-10-CM

## 2023-02-05 DIAGNOSIS — K76.0 NAFLD (NONALCOHOLIC FATTY LIVER DISEASE): ICD-10-CM

## 2023-02-05 DIAGNOSIS — K76.0 NAFLD (NONALCOHOLIC FATTY LIVER DISEASE): Primary | ICD-10-CM

## 2023-02-05 DIAGNOSIS — E78.5 HYPERLIPIDEMIA, UNSPECIFIED HYPERLIPIDEMIA TYPE: Primary | ICD-10-CM

## 2023-02-05 DIAGNOSIS — I10 ESSENTIAL HYPERTENSION: ICD-10-CM

## 2023-02-06 DIAGNOSIS — K76.0 NAFLD (NONALCOHOLIC FATTY LIVER DISEASE): ICD-10-CM

## 2023-02-06 DIAGNOSIS — E78.5 HYPERLIPIDEMIA, UNSPECIFIED HYPERLIPIDEMIA TYPE: ICD-10-CM

## 2023-02-06 DIAGNOSIS — E03.9 ACQUIRED HYPOTHYROIDISM: Primary | ICD-10-CM

## 2023-06-12 RX ORDER — LEVOTHYROXINE SODIUM 0.12 MG/1
TABLET ORAL
Qty: 90 TABLET | Refills: 0 | Status: SHIPPED | OUTPATIENT
Start: 2023-06-12

## 2023-06-12 NOTE — TELEPHONE ENCOUNTER
Last refill per pharmacy 3/17/23    Requested Prescriptions     Pending Prescriptions Disp Refills    levothyroxine (SYNTHROID) 125 MCG tablet [Pharmacy Med Name: LEVOTHYROXINE SODIUM 125MCG TABS] 90 tablet 1     Sig: TAKE ONE TABLET BY MOUTH EVERY MORNING BEFORE BREAKFAST

## 2023-07-07 ENCOUNTER — TELEPHONE (OUTPATIENT)
Age: 53
End: 2023-07-07

## 2023-07-07 DIAGNOSIS — Z12.31 ENCOUNTER FOR SCREENING MAMMOGRAM FOR BREAST CANCER: Primary | ICD-10-CM

## 2023-07-07 NOTE — TELEPHONE ENCOUNTER
----- Message from Nelly Ramirez sent at 7/6/2023  5:09 PM EDT -----  Regarding: Mammogram order  Contact: [phone]  Hello. Can I get a new mammogram order? I have an appointment on the 10th. thank you.  Barbie Duffy

## 2023-07-10 ENCOUNTER — HOSPITAL ENCOUNTER (OUTPATIENT)
Facility: HOSPITAL | Age: 53
Discharge: HOME | End: 2023-07-13
Attending: INTERNAL MEDICINE
Payer: COMMERCIAL

## 2023-07-10 DIAGNOSIS — Z12.31 SCREENING MAMMOGRAM, ENCOUNTER FOR: ICD-10-CM

## 2023-07-10 PROCEDURE — 77063 BREAST TOMOSYNTHESIS BI: CPT

## 2023-07-11 ENCOUNTER — TELEPHONE (OUTPATIENT)
Age: 53
End: 2023-07-11

## 2023-07-11 NOTE — TELEPHONE ENCOUNTER
Followed up with patient regarding labs. Patient was made aware that it is ok to keep her lab appointment as scheduled although PCP visit was rescheduled for 8/24/23.  HIPPA verified upon initiation of conversation

## 2023-07-11 NOTE — TELEPHONE ENCOUNTER
----- Message from Edita Morales sent at 7/11/2023  3:11 PM EDT -----  Subject: Message to Provider    QUESTIONS  Information for Provider? pt needs to know if she will need to reschedule   her lab appt since she had to reschedule her appt further out  ---------------------------------------------------------------------------  --------------  Dixie Ellston Gregor  [phone]; OK to leave message on voicemail  ---------------------------------------------------------------------------  --------------  SCRIPT ANSWERS  Relationship to Patient?  Self

## 2023-07-14 ENCOUNTER — NURSE ONLY (OUTPATIENT)
Age: 53
End: 2023-07-14

## 2023-07-14 DIAGNOSIS — E78.5 HYPERLIPIDEMIA, UNSPECIFIED HYPERLIPIDEMIA TYPE: ICD-10-CM

## 2023-07-14 DIAGNOSIS — E03.9 ACQUIRED HYPOTHYROIDISM: ICD-10-CM

## 2023-07-14 DIAGNOSIS — I10 ESSENTIAL HYPERTENSION: ICD-10-CM

## 2023-07-14 DIAGNOSIS — K76.0 NAFLD (NONALCOHOLIC FATTY LIVER DISEASE): ICD-10-CM

## 2023-07-15 LAB
ALBUMIN SERPL-MCNC: 4.8 G/DL (ref 3.8–4.9)
ALBUMIN/GLOB SERPL: 2.4 {RATIO} (ref 1.2–2.2)
ALP SERPL-CCNC: 84 IU/L (ref 44–121)
ALT SERPL-CCNC: 90 IU/L (ref 0–32)
AST SERPL-CCNC: 71 IU/L (ref 0–40)
BASOPHILS # BLD AUTO: 0 X10E3/UL (ref 0–0.2)
BASOPHILS NFR BLD AUTO: 1 %
BILIRUB SERPL-MCNC: 0.5 MG/DL (ref 0–1.2)
BUN SERPL-MCNC: 5 MG/DL (ref 6–24)
BUN/CREAT SERPL: 8 (ref 9–23)
CALCIUM SERPL-MCNC: 9.6 MG/DL (ref 8.7–10.2)
CHLORIDE SERPL-SCNC: 106 MMOL/L (ref 96–106)
CHOLEST SERPL-MCNC: 187 MG/DL (ref 100–199)
CO2 SERPL-SCNC: 24 MMOL/L (ref 20–29)
CREAT SERPL-MCNC: 0.64 MG/DL (ref 0.57–1)
EGFRCR SERPLBLD CKD-EPI 2021: 106 ML/MIN/1.73
EOSINOPHIL # BLD AUTO: 0.1 X10E3/UL (ref 0–0.4)
EOSINOPHIL NFR BLD AUTO: 3 %
ERYTHROCYTE [DISTWIDTH] IN BLOOD BY AUTOMATED COUNT: 11.5 % (ref 11.7–15.4)
GLOBULIN SER CALC-MCNC: 2 G/DL (ref 1.5–4.5)
GLUCOSE SERPL-MCNC: 94 MG/DL (ref 70–99)
HBA1C MFR BLD: 5.3 % (ref 4.8–5.6)
HCT VFR BLD AUTO: 46.4 % (ref 34–46.6)
HDLC SERPL-MCNC: 50 MG/DL
HGB BLD-MCNC: 15.3 G/DL (ref 11.1–15.9)
IMM GRANULOCYTES # BLD AUTO: 0 X10E3/UL (ref 0–0.1)
IMM GRANULOCYTES NFR BLD AUTO: 0 %
LDLC SERPL CALC-MCNC: 114 MG/DL (ref 0–99)
LYMPHOCYTES # BLD AUTO: 1.9 X10E3/UL (ref 0.7–3.1)
LYMPHOCYTES NFR BLD AUTO: 45 %
MCH RBC QN AUTO: 32.9 PG (ref 26.6–33)
MCHC RBC AUTO-ENTMCNC: 33 G/DL (ref 31.5–35.7)
MCV RBC AUTO: 100 FL (ref 79–97)
MONOCYTES # BLD AUTO: 0.5 X10E3/UL (ref 0.1–0.9)
MONOCYTES NFR BLD AUTO: 12 %
NEUTROPHILS # BLD AUTO: 1.7 X10E3/UL (ref 1.4–7)
NEUTROPHILS NFR BLD AUTO: 39 %
PLATELET # BLD AUTO: 274 X10E3/UL (ref 150–450)
POTASSIUM SERPL-SCNC: 4.5 MMOL/L (ref 3.5–5.2)
PROT SERPL-MCNC: 6.8 G/DL (ref 6–8.5)
RBC # BLD AUTO: 4.65 X10E6/UL (ref 3.77–5.28)
SODIUM SERPL-SCNC: 143 MMOL/L (ref 134–144)
SPECIMEN STATUS REPORT: NORMAL
TRIGL SERPL-MCNC: 130 MG/DL (ref 0–149)
VLDLC SERPL CALC-MCNC: 23 MG/DL (ref 5–40)
WBC # BLD AUTO: 4.3 X10E3/UL (ref 3.4–10.8)

## 2023-07-17 RX ORDER — SIMVASTATIN 20 MG
TABLET ORAL
Qty: 90 TABLET | Refills: 0 | Status: SHIPPED | OUTPATIENT
Start: 2023-07-17

## 2023-08-24 ENCOUNTER — OFFICE VISIT (OUTPATIENT)
Age: 53
End: 2023-08-24
Payer: COMMERCIAL

## 2023-08-24 VITALS
DIASTOLIC BLOOD PRESSURE: 77 MMHG | WEIGHT: 155 LBS | OXYGEN SATURATION: 99 % | RESPIRATION RATE: 18 BRPM | HEART RATE: 76 BPM | HEIGHT: 66 IN | TEMPERATURE: 98 F | SYSTOLIC BLOOD PRESSURE: 129 MMHG | BODY MASS INDEX: 24.91 KG/M2

## 2023-08-24 DIAGNOSIS — Z00.00 ROUTINE GENERAL MEDICAL EXAMINATION AT A HEALTH CARE FACILITY: Primary | ICD-10-CM

## 2023-08-24 DIAGNOSIS — K76.0 NAFLD (NONALCOHOLIC FATTY LIVER DISEASE): ICD-10-CM

## 2023-08-24 DIAGNOSIS — M25.561 RIGHT KNEE PAIN, UNSPECIFIED CHRONICITY: ICD-10-CM

## 2023-08-24 DIAGNOSIS — E78.5 HYPERLIPIDEMIA, UNSPECIFIED HYPERLIPIDEMIA TYPE: ICD-10-CM

## 2023-08-24 DIAGNOSIS — Z12.11 ENCOUNTER FOR SCREENING FOR MALIGNANT NEOPLASM OF COLON: ICD-10-CM

## 2023-08-24 DIAGNOSIS — E03.9 ACQUIRED HYPOTHYROIDISM: ICD-10-CM

## 2023-08-24 PROBLEM — F11.99 OPIOID USE, UNSPECIFIED WITH UNSPECIFIED OPIOID-INDUCED DISORDER (HCC): Status: RESOLVED | Noted: 2022-09-07 | Resolved: 2023-08-24

## 2023-08-24 PROCEDURE — 3078F DIAST BP <80 MM HG: CPT | Performed by: INTERNAL MEDICINE

## 2023-08-24 PROCEDURE — 99396 PREV VISIT EST AGE 40-64: CPT | Performed by: INTERNAL MEDICINE

## 2023-08-24 PROCEDURE — 3074F SYST BP LT 130 MM HG: CPT | Performed by: INTERNAL MEDICINE

## 2023-08-24 SDOH — ECONOMIC STABILITY: INCOME INSECURITY: HOW HARD IS IT FOR YOU TO PAY FOR THE VERY BASICS LIKE FOOD, HOUSING, MEDICAL CARE, AND HEATING?: NOT HARD AT ALL

## 2023-08-24 SDOH — ECONOMIC STABILITY: FOOD INSECURITY: WITHIN THE PAST 12 MONTHS, YOU WORRIED THAT YOUR FOOD WOULD RUN OUT BEFORE YOU GOT MONEY TO BUY MORE.: NEVER TRUE

## 2023-08-24 SDOH — ECONOMIC STABILITY: FOOD INSECURITY: WITHIN THE PAST 12 MONTHS, THE FOOD YOU BOUGHT JUST DIDN'T LAST AND YOU DIDN'T HAVE MONEY TO GET MORE.: NEVER TRUE

## 2023-08-24 SDOH — ECONOMIC STABILITY: HOUSING INSECURITY
IN THE LAST 12 MONTHS, WAS THERE A TIME WHEN YOU DID NOT HAVE A STEADY PLACE TO SLEEP OR SLEPT IN A SHELTER (INCLUDING NOW)?: NO

## 2023-08-24 ASSESSMENT — PATIENT HEALTH QUESTIONNAIRE - PHQ9
SUM OF ALL RESPONSES TO PHQ QUESTIONS 1-9: 0
1. LITTLE INTEREST OR PLEASURE IN DOING THINGS: 0
2. FEELING DOWN, DEPRESSED OR HOPELESS: 0
SUM OF ALL RESPONSES TO PHQ9 QUESTIONS 1 & 2: 0

## 2023-08-30 ASSESSMENT — ENCOUNTER SYMPTOMS
EYE PAIN: 0
SHORTNESS OF BREATH: 0
SORE THROAT: 0
BLOOD IN STOOL: 0
COUGH: 0
ABDOMINAL PAIN: 0
ANAL BLEEDING: 0

## 2023-09-14 RX ORDER — LEVOTHYROXINE SODIUM 0.12 MG/1
TABLET ORAL
Qty: 90 TABLET | Refills: 1 | Status: SHIPPED | OUTPATIENT
Start: 2023-09-14

## 2023-09-14 RX ORDER — LEVOTHYROXINE SODIUM 0.12 MG/1
125 TABLET ORAL
Qty: 90 TABLET | Refills: 1 | Status: SHIPPED | OUTPATIENT
Start: 2023-09-14

## 2023-10-12 RX ORDER — SIMVASTATIN 20 MG
20 TABLET ORAL
Qty: 90 TABLET | Refills: 1 | Status: SHIPPED | OUTPATIENT
Start: 2023-10-12

## 2024-01-02 ENCOUNTER — TELEPHONE (OUTPATIENT)
Age: 54
End: 2024-01-02

## 2024-01-02 ENCOUNTER — PATIENT MESSAGE (OUTPATIENT)
Age: 54
End: 2024-01-02

## 2024-01-02 NOTE — TELEPHONE ENCOUNTER
----- Message from Celine Ramirez sent at 1/2/2024  2:47 PM EST -----  Regarding: GERD  Contact: [phone]  Hello,  Several years back before my weight loss journey I tried all over the counter medications with no relief and was sent to GI- where I was told I had gastritis. They then put me on Dexilant. It worked no issues then after weight loss I came off the medication because i had read i may no longer need it. It was true no issues for years. For some reason with the weight gain with the broken knee it suddenly appeared and fast. I went back on over the counter meds. I am currently taking Pepcid 20mg BID, and chewing TUMs when its bad. I had another bad flair up which landed me in bed New years candi with sweats, back and chest pain and burning in my throat. Couldn't eat or drink without it coming up in my throat. It caused my BP to go crazy high as well-200 over 110/ average 180/96. It has calmed down now, but I still feel it in my back. BP is back to normal.  I have cut out all sodas and fatty foods. Please advise on next steps so that I can get back to normal.   Thank you so much,  Celine

## 2024-01-17 ENCOUNTER — HOSPITAL ENCOUNTER (EMERGENCY)
Facility: HOSPITAL | Age: 54
Discharge: HOME | End: 2024-01-18
Attending: EMERGENCY MEDICINE
Payer: COMMERCIAL

## 2024-01-17 DIAGNOSIS — S82.035A CLOSED NONDISPLACED TRANSVERSE FRACTURE OF LEFT PATELLA, INITIAL ENCOUNTER: Primary | ICD-10-CM

## 2024-01-17 PROCEDURE — 96374 THER/PROPH/DIAG INJ IV PUSH: CPT

## 2024-01-17 PROCEDURE — 99284 EMERGENCY DEPT VISIT MOD MDM: CPT

## 2024-01-17 ASSESSMENT — LIFESTYLE VARIABLES
HOW OFTEN DO YOU HAVE A DRINK CONTAINING ALCOHOL: MONTHLY OR LESS
HOW MANY STANDARD DRINKS CONTAINING ALCOHOL DO YOU HAVE ON A TYPICAL DAY: 1 OR 2

## 2024-01-18 ENCOUNTER — HOSPITAL ENCOUNTER (OUTPATIENT)
Facility: HOSPITAL | Age: 54
Discharge: HOME | End: 2024-01-18
Payer: COMMERCIAL

## 2024-01-18 ENCOUNTER — APPOINTMENT (OUTPATIENT)
Facility: HOSPITAL | Age: 54
End: 2024-01-18
Payer: COMMERCIAL

## 2024-01-18 ENCOUNTER — OFFICE VISIT (OUTPATIENT)
Age: 54
End: 2024-01-18
Payer: COMMERCIAL

## 2024-01-18 VITALS
RESPIRATION RATE: 18 BRPM | TEMPERATURE: 98.1 F | BODY MASS INDEX: 25.83 KG/M2 | WEIGHT: 155 LBS | HEIGHT: 65 IN | OXYGEN SATURATION: 97 % | DIASTOLIC BLOOD PRESSURE: 75 MMHG | HEART RATE: 79 BPM | SYSTOLIC BLOOD PRESSURE: 135 MMHG

## 2024-01-18 DIAGNOSIS — S82.032A CLOSED DISPLACED TRANSVERSE FRACTURE OF LEFT PATELLA, INITIAL ENCOUNTER: Primary | ICD-10-CM

## 2024-01-18 DIAGNOSIS — S82.032A CLOSED DISPLACED TRANSVERSE FRACTURE OF LEFT PATELLA, INITIAL ENCOUNTER: ICD-10-CM

## 2024-01-18 LAB
ANION GAP SERPL CALC-SCNC: 1 MMOL/L (ref 3–18)
BUN SERPL-MCNC: 7 MG/DL (ref 7–18)
BUN/CREAT SERPL: 9 (ref 12–20)
CALCIUM SERPL-MCNC: 9.5 MG/DL (ref 8.5–10.1)
CHLORIDE SERPL-SCNC: 103 MMOL/L (ref 100–111)
CO2 SERPL-SCNC: 31 MMOL/L (ref 21–32)
CREAT SERPL-MCNC: 0.77 MG/DL (ref 0.6–1.3)
EKG ATRIAL RATE: 67 BPM
EKG DIAGNOSIS: NORMAL
EKG P AXIS: 54 DEGREES
EKG P-R INTERVAL: 132 MS
EKG Q-T INTERVAL: 378 MS
EKG QRS DURATION: 88 MS
EKG QTC CALCULATION (BAZETT): 399 MS
EKG R AXIS: 68 DEGREES
EKG T AXIS: 32 DEGREES
EKG VENTRICULAR RATE: 67 BPM
ERYTHROCYTE [DISTWIDTH] IN BLOOD BY AUTOMATED COUNT: 11.2 % (ref 11.6–14.5)
GLUCOSE SERPL-MCNC: 112 MG/DL (ref 74–99)
HCT VFR BLD AUTO: 40.6 % (ref 35–45)
HGB BLD-MCNC: 14.1 G/DL (ref 12–16)
MCH RBC QN AUTO: 33.5 PG (ref 24–34)
MCHC RBC AUTO-ENTMCNC: 34.7 G/DL (ref 31–37)
MCV RBC AUTO: 96.4 FL (ref 78–100)
NRBC # BLD: 0 K/UL (ref 0–0.01)
NRBC BLD-RTO: 0 PER 100 WBC
PLATELET # BLD AUTO: 254 K/UL (ref 135–420)
PMV BLD AUTO: 10 FL (ref 9.2–11.8)
POTASSIUM SERPL-SCNC: 4.3 MMOL/L (ref 3.5–5.5)
RBC # BLD AUTO: 4.21 M/UL (ref 4.2–5.3)
SODIUM SERPL-SCNC: 135 MMOL/L (ref 136–145)
WBC # BLD AUTO: 7.3 K/UL (ref 4.6–13.2)

## 2024-01-18 PROCEDURE — 80048 BASIC METABOLIC PNL TOTAL CA: CPT

## 2024-01-18 PROCEDURE — G8419 CALC BMI OUT NRM PARAM NOF/U: HCPCS | Performed by: ORTHOPAEDIC SURGERY

## 2024-01-18 PROCEDURE — 73560 X-RAY EXAM OF KNEE 1 OR 2: CPT

## 2024-01-18 PROCEDURE — 99214 OFFICE O/P EST MOD 30 MIN: CPT | Performed by: ORTHOPAEDIC SURGERY

## 2024-01-18 PROCEDURE — 4004F PT TOBACCO SCREEN RCVD TLK: CPT | Performed by: ORTHOPAEDIC SURGERY

## 2024-01-18 PROCEDURE — G8484 FLU IMMUNIZE NO ADMIN: HCPCS | Performed by: ORTHOPAEDIC SURGERY

## 2024-01-18 PROCEDURE — G8427 DOCREV CUR MEDS BY ELIG CLIN: HCPCS | Performed by: ORTHOPAEDIC SURGERY

## 2024-01-18 PROCEDURE — 6370000000 HC RX 637 (ALT 250 FOR IP)

## 2024-01-18 PROCEDURE — 3017F COLORECTAL CA SCREEN DOC REV: CPT | Performed by: ORTHOPAEDIC SURGERY

## 2024-01-18 PROCEDURE — 93010 ELECTROCARDIOGRAM REPORT: CPT | Performed by: INTERNAL MEDICINE

## 2024-01-18 PROCEDURE — 6360000002 HC RX W HCPCS

## 2024-01-18 PROCEDURE — 85027 COMPLETE CBC AUTOMATED: CPT

## 2024-01-18 PROCEDURE — 93005 ELECTROCARDIOGRAM TRACING: CPT

## 2024-01-18 PROCEDURE — 36415 COLL VENOUS BLD VENIPUNCTURE: CPT

## 2024-01-18 RX ORDER — RIVAROXABAN 10 MG/1
10 TABLET, FILM COATED ORAL
Qty: 30 TABLET | Refills: 1 | Status: SHIPPED | OUTPATIENT
Start: 2024-01-18

## 2024-01-18 RX ORDER — ACETAMINOPHEN 325 MG/1
1000 TABLET ORAL ONCE
Status: CANCELLED | OUTPATIENT
Start: 2024-01-26 | End: 2024-01-18

## 2024-01-18 RX ORDER — KETOROLAC TROMETHAMINE 15 MG/ML
15 INJECTION, SOLUTION INTRAMUSCULAR; INTRAVENOUS
Status: COMPLETED | OUTPATIENT
Start: 2024-01-18 | End: 2024-01-18

## 2024-01-18 RX ORDER — GABAPENTIN 100 MG/1
300 CAPSULE ORAL ONCE
Status: CANCELLED | OUTPATIENT
Start: 2024-01-26 | End: 2024-01-18

## 2024-01-18 RX ORDER — ACETAMINOPHEN 500 MG
1000 TABLET ORAL 4 TIMES DAILY PRN
Qty: 360 TABLET | Refills: 1 | Status: SHIPPED | OUTPATIENT
Start: 2024-01-18

## 2024-01-18 RX ORDER — OXYCODONE HYDROCHLORIDE AND ACETAMINOPHEN 5; 325 MG/1; MG/1
1 TABLET ORAL EVERY 6 HOURS PRN
Qty: 12 TABLET | Refills: 0 | Status: SHIPPED | OUTPATIENT
Start: 2024-01-18 | End: 2024-01-18

## 2024-01-18 RX ORDER — OXYCODONE HYDROCHLORIDE AND ACETAMINOPHEN 5; 325 MG/1; MG/1
2 TABLET ORAL
Status: COMPLETED | OUTPATIENT
Start: 2024-01-18 | End: 2024-01-18

## 2024-01-18 RX ORDER — GABAPENTIN 100 MG/1
100 CAPSULE ORAL 3 TIMES DAILY
Qty: 90 CAPSULE | Refills: 0 | Status: SHIPPED | OUTPATIENT
Start: 2024-01-18 | End: 2024-02-17

## 2024-01-18 RX ORDER — HYDROCODONE BITARTRATE AND ACETAMINOPHEN 10; 325 MG/1; MG/1
1 TABLET ORAL EVERY 4 HOURS PRN
Qty: 60 TABLET | Refills: 0 | Status: SHIPPED | OUTPATIENT
Start: 2024-01-18 | End: 2024-01-18

## 2024-01-18 RX ORDER — CELECOXIB 100 MG/1
200 CAPSULE ORAL ONCE
Status: CANCELLED | OUTPATIENT
Start: 2024-01-26 | End: 2024-01-18

## 2024-01-18 RX ORDER — OXYCODONE HYDROCHLORIDE AND ACETAMINOPHEN 5; 325 MG/1; MG/1
1 TABLET ORAL EVERY 6 HOURS PRN
Qty: 12 TABLET | Refills: 0 | Status: SHIPPED | OUTPATIENT
Start: 2024-01-18 | End: 2024-01-21

## 2024-01-18 RX ORDER — HYDROCODONE BITARTRATE AND ACETAMINOPHEN 10; 325 MG/1; MG/1
1 TABLET ORAL EVERY 4 HOURS PRN
Qty: 60 TABLET | Refills: 0 | Status: SHIPPED | OUTPATIENT
Start: 2024-01-18 | End: 2024-01-28

## 2024-01-18 RX ORDER — IBUPROFEN 400 MG/1
400 TABLET ORAL EVERY 6 HOURS PRN
Qty: 120 TABLET | Refills: 0 | Status: SHIPPED | OUTPATIENT
Start: 2024-01-18

## 2024-01-18 RX ADMIN — KETOROLAC TROMETHAMINE 15 MG: 15 INJECTION, SOLUTION INTRAMUSCULAR; INTRAVENOUS at 01:01

## 2024-01-18 RX ADMIN — OXYCODONE AND ACETAMINOPHEN 2 TABLET: 5; 325 TABLET ORAL at 01:33

## 2024-01-18 ASSESSMENT — PAIN DESCRIPTION - LOCATION
LOCATION: KNEE

## 2024-01-18 ASSESSMENT — PAIN DESCRIPTION - ORIENTATION
ORIENTATION: LEFT

## 2024-01-18 ASSESSMENT — PAIN SCALES - GENERAL
PAINLEVEL_OUTOF10: 6
PAINLEVEL_OUTOF10: 9
PAINLEVEL_OUTOF10: 8

## 2024-01-18 ASSESSMENT — LIFESTYLE VARIABLES
HOW MANY STANDARD DRINKS CONTAINING ALCOHOL DO YOU HAVE ON A TYPICAL DAY: 3 OR 4
HOW OFTEN DO YOU HAVE A DRINK CONTAINING ALCOHOL: 2-4 TIMES A MONTH

## 2024-01-18 ASSESSMENT — PAIN DESCRIPTION - DESCRIPTORS
DESCRIPTORS: ACHING;DISCOMFORT
DESCRIPTORS: ACHING;GNAWING

## 2024-01-18 ASSESSMENT — ENCOUNTER SYMPTOMS
ABDOMINAL PAIN: 0
SORE THROAT: 0
CHEST TIGHTNESS: 0
EYE PAIN: 0

## 2024-01-18 NOTE — ED NOTES
Pt to D reports GLF left knee pain and known deformity. 4mg Zofran 100 mcg fentayl by EMS pt arrived with 22g PIV to Left AC,

## 2024-01-18 NOTE — ED NOTES
Knee immobilizer fitted to pt ,NAD VSS pt tolerated well . Unable to fir for crutches pt states  uncoordinated for use. Pt will be wheeled to car and use at home walker

## 2024-01-18 NOTE — ED PROVIDER NOTES
[facility] EMERGENCY DEPT  [address] [address]  Dept: [phone]  Loc: [phone]    Open Note Time:  1:07 AM EST    I independently performed a history and physical on Celine Ramirez.      Chief Complaint  Fall       This is a very pleasant 53 y.o. female  who was evaluated in the emergency department for left knee injury        Unless otherwise stated in this report or unable to obtain because of the patient's clinical or mental status as evidenced by the medical record, this patient's positive and negative responses for review of systems, constitutional, psych, eyes, ENT, cardiovascular, respiratory, gastrointestinal, neurological, genitourinary, musculoskeletal, integument systems and systems related to the presenting problem are either stated in the preceding paragraph or were not pertinent or were negative for the symptoms and/or complaints related to the medical problem.    I wore goggles, surgical mask, N95 mask and gloves when I evaluated the patient.    Focused exam:  Body mass index is 25.79 kg/m².  The physical exam reveals an alert and oriented patient who does not appear to be confused, non-ill-appearing, no abnormal heart sounds, no abnormal lung sounds, speaking comfortably in full sentences, left knee has some mild edema, she has significant difficulty with flexion and extension secondary to pain, no ecchymosis, no decreased sensation stated between the first and second digit of the left foot    Brief ED course/MDM:       I evaluated the patient in room ER18/18    Procedures/interventions/images ordered for this visit  Orders Placed This Encounter   Procedures    XR KNEE LEFT (1-2 VIEWS)    DME - DURABLE MEDICAL EQUIPMENT       Medications ordered for this visit  Orders Placed This Encounter   Medications    ketorolac (TORADOL) injection 15 mg       ED course notes for this visit       Consults ordered:  None  Discussion with Other Professionals :

## 2024-01-18 NOTE — PROGRESS NOTES
Celine Ramirez  1970   Chief Complaint   Patient presents with    Knee Pain     Lt         HISTORY OF PRESENT ILLNESS  Celine Ramirez is a 53 y.o. female who presents today for reevaluation of right knee pain. Pain is a 4/10. She is s/p ORIF right patella fracture on 9/12/2022. Was last seen in the office by DOUGIE Larsen on 11/16/2022. Overall doing well. Finished with PT last week. Having no pain and has resumed with most of her normal ADLs. She has been trying to taper down on using the Gabapentin, is still taking it twice a day on occasion as needed. Pt presented tto the ED on 01/17/2024 c/o left knee pain due to fall.    Pt presented today in a wheelchair and a leg brace. The patient states that her swelling causes some discomfort and she describes feeling pain at the fracture sight during the onset of injury. The patient states that she experienced pain following her first right patella ORIF.    Patient denies any fever, chills, chest pain, shortness of breath or calf pain. The remainder of the review of systems is negative. There are no new illness or injuries to report since last seen in the office. No changes in medications, allergies, social or family history.      PHYSICAL EXAM:   There were no vitals taken for this visit.  The patient is a well-developed, well-nourished female   in no acute distress.  The patient is alert and oriented times three.  The patient is alert and oriented times three. Mood and affect are normal.  LYMPHATIC: lymph nodes are not enlarged and are within normal limits  SKIN: normal in color and non tender to palpation. There are no bruises or abrasions noted.   NEUROLOGICAL: Motor sensory exam is within normal limits. Reflexes are equal bilaterally. There is normal sensation to pinprick and light touch  MUSCULOSKELETAL: On inspection diffuse swelling of the left knee with marked effusion and point tenderness over the mid patella.  Unable to assess range of

## 2024-01-18 NOTE — H&P
normal       RADIOGRAPHS & DIAGNOSTIC STUDIES:     MRI/xray reveals : On inspection diffuse swelling of the left knee with marked effusion and point tenderness over the mid patella. Unable to assess range of motion because of pain. Neurovascular status intact       LABS:       CBC:   Lab Results   Component Value Date/Time    WBC 7.3 01/18/2024 03:39 PM    HGB 14.1 01/18/2024 03:39 PM    HCT 40.6 01/18/2024 03:39 PM     01/18/2024 03:39 PM    MCV 96.4 01/18/2024 03:39 PM     BMP:   Lab Results   Component Value Date/Time     01/18/2024 03:39 PM    K 4.3 01/18/2024 03:39 PM     01/18/2024 03:39 PM    CO2 31 01/18/2024 03:39 PM    BUN 7 01/18/2024 03:39 PM    GFRAA >60 09/07/2022 03:19 PM        Preoperative labs were reviewed and are substantially within normal limits   EKG:     Normal sinus rhythm  Normal ECG  No previous ECGs available  Confirmed by MD Susy, Jose ([id number]) on 1/18/2024 4:22:53 PM         ASSESSMENT:       Encounter Diagnosis   Name Primary?    Other closed fracture of left patella, initial encounter Yes       PLAN:     Again, the alternatives, risks, and complications, as well as expected outcome were discussed. The patient understands and agrees to proceed with ORIF left patella. The patient was counseled at length about the risks of seema Covid-19 during their perioperative period and any recovery window from their procedure.  The patient was made aware that seema Covid-19  may worsen their prognosis for recovering from their procedure and lend to a higher morbidity and/or mortality risk.  All material risks, benefits, and reasonable alternatives including postponing the procedure were discussed. The patient does  wish to proceed with the procedure at this time.   Patient given orders listed below:    Orders Placed This Encounter   Procedures    Basic Metabolic Panel    CBC    EKG 12 Lead         Michelle Larsen PA-C  1/18/2024  1:08 PM

## 2024-01-18 NOTE — ED PROVIDER NOTES
Emergency Department Treatment Report    Patient: Celine Ramirez Age: 53 y.o. Sex: female    YOB: 1970 Admit Date: 1/17/2024 PCP: Narcisa León MD   MRN: [id number]  CSN: [id number]     Room: [place] [address] Time Dictated: 1:26 AM        Chief Complaint   Chief Complaint   Patient presents with    Fall       History of Present Illness   Celine Ramirez is a 53 y.o. female with past medical history of hyperlipidemia, hypothyroidism, and GERD who presents to the ED via EMS status post mechanical fall with left knee injury which occurred just prior to arrival.  Patient states that she woke up in the middle of the night and slipped on her wet floor landing directly on her left knee.  She immediately had left knee pain and was unable to walk.  Denies hitting her head or any loss of consciousness at that time.  Further denies any numbness in the leg.  Patient received 100 mcg of fentanyl and 4 mg Zofran and route to the ED. Patient is not on blood thinners.    Review of Systems   Review of Systems   HENT:  Negative for ear pain and sore throat.    Eyes:  Negative for pain.   Respiratory:  Negative for chest tightness.    Cardiovascular:  Negative for chest pain.   Gastrointestinal:  Negative for abdominal pain.   Genitourinary:  Negative for pelvic pain.   Musculoskeletal:         Positive knee pain   Skin:  Negative for wound.   Neurological:  Negative for syncope and headaches.          Past Medical/Surgical History     Past Medical History:   Diagnosis Date    Contact dermatitis and eczema due to cause 1996    Hydradenitis    GERD (gastroesophageal reflux disease)     Hypercholesterolemia 2016    Hypertension 10/2017    Mitral valve prolapse     Other ill-defined conditions(799.89)     mitral valve prolapse    Thyroid disease     hypo     Past Surgical History:   Procedure Laterality Date    GYN      ov cyst    GYN      d & c's    HYSTERECTOMY (CERVIX STATUS UNKNOWN)      OTHER SURGICAL

## 2024-01-18 NOTE — ED TRIAGE NOTES
Pt brought in by medic after falling.  Pt states her foot slipped on hardwood floor.   Pt having pain and decreased rom in left knee

## 2024-01-18 NOTE — ED NOTES
Provider at bedside. Pt stated that she slipped on the hardwood floors and fell on her left leg. Pt stated she did not hit her head when she fell. VS taken. Provider ordering X-ray. No other concerns at this time. Pt stated 6 out of 10 at this time.

## 2024-01-19 ENCOUNTER — TELEPHONE (OUTPATIENT)
Age: 54
End: 2024-01-19

## 2024-01-19 ENCOUNTER — CARE COORDINATION (OUTPATIENT)
Dept: OTHER | Facility: CLINIC | Age: 54
End: 2024-01-19

## 2024-01-19 RX ORDER — ASPIRIN 81 MG/1
81 TABLET, CHEWABLE ORAL DAILY
COMMUNITY

## 2024-01-19 SDOH — ECONOMIC STABILITY: TRANSPORTATION INSECURITY
IN THE PAST 12 MONTHS, HAS THE LACK OF TRANSPORTATION KEPT YOU FROM MEDICAL APPOINTMENTS OR FROM GETTING MEDICATIONS?: NO

## 2024-01-19 SDOH — ECONOMIC STABILITY: FOOD INSECURITY: WITHIN THE PAST 12 MONTHS, THE FOOD YOU BOUGHT JUST DIDN'T LAST AND YOU DIDN'T HAVE MONEY TO GET MORE.: NEVER TRUE

## 2024-01-19 SDOH — ECONOMIC STABILITY: FOOD INSECURITY: WITHIN THE PAST 12 MONTHS, YOU WORRIED THAT YOUR FOOD WOULD RUN OUT BEFORE YOU GOT MONEY TO BUY MORE.: NEVER TRUE

## 2024-01-19 SDOH — ECONOMIC STABILITY: TRANSPORTATION INSECURITY
IN THE PAST 12 MONTHS, HAS LACK OF TRANSPORTATION KEPT YOU FROM MEETINGS, WORK, OR FROM GETTING THINGS NEEDED FOR DAILY LIVING?: NO

## 2024-01-19 SDOH — ECONOMIC STABILITY: HOUSING INSECURITY: IN THE LAST 12 MONTHS, HOW MANY PLACES HAVE YOU LIVED?: 1

## 2024-01-19 SDOH — ECONOMIC STABILITY: INCOME INSECURITY: IN THE LAST 12 MONTHS, WAS THERE A TIME WHEN YOU WERE NOT ABLE TO PAY THE MORTGAGE OR RENT ON TIME?: NO

## 2024-01-19 NOTE — TELEPHONE ENCOUNTER
Spoke with patient via phone. Relayed message and offered to send via [facility] as well. Message sent and patient verbalized understanding with intent to comply.

## 2024-01-19 NOTE — CARE COORDINATION
their home environment in terms of safety and stability (including domestic violence, insecure housing, neighbor harassment)?: Consistently safe, supportive, stable, no identified problems   How would you rate their financial resources (including ability to afford all required medical care)?: Financially secure, resources adequate, no identified problems   How wells does the patient now understand their health and well-being (symptoms, signs or risk factors) and what they need to do to manage their health?: Reasonable to good understanding and already engages in managing health or is willing to undertake better management   How well do you think your patient can engage in healthcare discussions? (Barriers include language, deafness, aphasia, alcohol or drug problems, learning difficulties, concentration): Clear and open communication, no identified barriers   Do other services need to be involved to help this patient?: Other care/services not required at this time   Are current services involved with this patient well-coordinated? (Include coordination with other services you are now recommendation): All required care/services in place and well-coordinated   Suggested Interventions and Community Resources                  Mariana JENKINS, RN- [facility]  Associate Care Manager  [phone]  [email or web address]      No future appointments.

## 2024-01-19 NOTE — PERIOP NOTE
Instructions for your surgery at [facility]      Today's Date:  1/19/2024      Patient's Name:  Celine Ramirez           Surgery Date:  1/26/2024              Please enter the main entrance of the hospital and check-in at the  located in the lobby. Once checked in at the , you will take the elevators to the second floor, and report to the waiting room on the left. The room will say Procedure Registration.    Do NOT eat or drink anything, including candy, gum, or ice chips after midnight prior to your surgery, unless you have specific instructions from your surgeon or anesthesia provider to do so.  Brush your teeth before coming to the hospital. You may swish with water, but do not swallow.  No smoking/Vaping/E-Cigarettes 24 hours prior to the day of surgery.  No alcohol 24 hours prior to the day of surgery.  No recreational drugs for one week prior to the day of surgery.  Bring Photo ID, Insurance information, and Co-pay if required on day of surgery.  Bring in pertinent legal documents, such as, Medical Power of , DNR, Advance Directive, etc.  Leave all valuables, including money/purse, at home.  Remove all jewelry, including ALL body piercings, nail polish, acrylic nails, and makeup (including mascara); no lotions, powders, deodorant, or perfume/cologne/after shave on the skin.  Follow instruction for Hibiclens washes and CHG wipes from surgeon's office.   Glasses and dentures may be worn to the hospital. They must be removed prior to surgery. Please bring case/container for glasses or dentures.   Contact lenses should not be worn on day of surgery.   Call your doctor's office if symptoms of a cold or illness develop within 24-48 hours prior to your surgery.  Call your doctor's office if you have any questions concerning insurance or co-pays.  15. AN ADULT (relative or friend 18 years or older) MUST DRIVE YOU HOME AFTER YOUR SURGERY.  16. Please make

## 2024-01-19 NOTE — TELEPHONE ENCOUNTER
There was confusion with pat call.     Do not take xarelto until evening of surgery. NOT evening before. She can take 325mg Aspirin now up until Wed. Only take 81mg aspirin wed thurs and nothing Friday am. Take first dose of xarelto Friday evening

## 2024-01-23 ENCOUNTER — TELEPHONE (OUTPATIENT)
Age: 54
End: 2024-01-23

## 2024-01-23 NOTE — TELEPHONE ENCOUNTER
Patient called and stated that she's scheduled for surgery on Friday and wanted to inform Dr. Champagne that she faxed over her FMLA documentation yesterday.

## 2024-01-24 ENCOUNTER — CLINICAL DOCUMENTATION (OUTPATIENT)
Age: 54
End: 2024-01-24

## 2024-01-24 NOTE — PROGRESS NOTES
[employer] faxed blank work ability evaluation form to [facility]. Scanned into media and place in form bin on 1.24.24.

## 2024-01-25 ENCOUNTER — ANESTHESIA EVENT (OUTPATIENT)
Facility: HOSPITAL | Age: 54
End: 2024-01-25
Payer: COMMERCIAL

## 2024-01-25 NOTE — DISCHARGE INSTRUCTIONS
Dr. Champagne’s Non-Arthroscopic  Postoperative Information      You will be given a prescription for pain medication.  It may be taken every 4-6 hours as needed for pain for the first 4-5 days.  Take gabapentin as prescribed postoperatively as needed for discomfort  Take your first dosage of Xarelto TODAY (Friday evening).  Remember while on this do not take any other anti-inflammatory such as ibuprofen, aspirin, Advil or Aleve. Tylenol is ok    A bandage was placed on your incision after surgery. You need to keep this incision clean and dry.  You may shower postop day #1 but keep the area dry.  Remember to keep your brace on when ambulating.  It should be set for specific range of motion.  We will adjust this in the office  The operated area may be sore and develop bruising over the next several days. You should expect swelling in the area. You may elevate the operated extremity and apply an icepack on top of the dressing to help minimize the swelling.       If you have a high temperature, unexpected pain, redness or swelling, or any drainage around your operated area, please call my office immediately.     Please make an appointment to return to my office in one week.    Dr. Champagne’s office number [phone]       DISCHARGE SUMMARY from Nurse    PATIENT INSTRUCTIONS:    After general anesthesia or intravenous sedation, for 24 hours or while taking prescription Narcotics:  Limit your activities  Do not drive and operate hazardous machinery  Do not make important personal or business decisions  Do  not drink alcoholic beverages  If you have not urinated within 8 hours after discharge, please contact your surgeon on call.    Report the following to your surgeon:  Excessive pain, swelling, redness or odor of or around the surgical area  Temperature over 100.5  Nausea and vomiting lasting longer than 4 hours or if unable to take medications  Any signs of decreased circulation or nerve impairment to extremity: change in

## 2024-01-26 ENCOUNTER — APPOINTMENT (OUTPATIENT)
Facility: HOSPITAL | Age: 54
End: 2024-01-26
Attending: ORTHOPAEDIC SURGERY
Payer: COMMERCIAL

## 2024-01-26 ENCOUNTER — ANESTHESIA (OUTPATIENT)
Facility: HOSPITAL | Age: 54
End: 2024-01-26
Payer: COMMERCIAL

## 2024-01-26 ENCOUNTER — HOSPITAL ENCOUNTER (OUTPATIENT)
Facility: HOSPITAL | Age: 54
Setting detail: OUTPATIENT SURGERY
Discharge: HOME | End: 2024-01-26
Attending: ORTHOPAEDIC SURGERY | Admitting: ORTHOPAEDIC SURGERY
Payer: COMMERCIAL

## 2024-01-26 VITALS
SYSTOLIC BLOOD PRESSURE: 143 MMHG | HEART RATE: 74 BPM | RESPIRATION RATE: 17 BRPM | OXYGEN SATURATION: 97 % | DIASTOLIC BLOOD PRESSURE: 86 MMHG | HEIGHT: 65 IN | WEIGHT: 155 LBS | TEMPERATURE: 97.6 F | BODY MASS INDEX: 25.83 KG/M2

## 2024-01-26 PROBLEM — S82.032A CLOSED DISPLACED TRANSVERSE FRACTURE OF LEFT PATELLA: Status: ACTIVE | Noted: 2022-09-19

## 2024-01-26 PROCEDURE — C1713 ANCHOR/SCREW BN/BN,TIS/BN: HCPCS | Performed by: ORTHOPAEDIC SURGERY

## 2024-01-26 PROCEDURE — 7100000010 HC PHASE II RECOVERY - FIRST 15 MIN: Performed by: ORTHOPAEDIC SURGERY

## 2024-01-26 PROCEDURE — 6360000002 HC RX W HCPCS: Performed by: NURSE ANESTHETIST, CERTIFIED REGISTERED

## 2024-01-26 PROCEDURE — 2700000000 HC OXYGEN THERAPY PER DAY

## 2024-01-26 PROCEDURE — 3600000012 HC SURGERY LEVEL 2 ADDTL 15MIN: Performed by: ORTHOPAEDIC SURGERY

## 2024-01-26 PROCEDURE — 3700000000 HC ANESTHESIA ATTENDED CARE: Performed by: ORTHOPAEDIC SURGERY

## 2024-01-26 PROCEDURE — 7100000000 HC PACU RECOVERY - FIRST 15 MIN: Performed by: ORTHOPAEDIC SURGERY

## 2024-01-26 PROCEDURE — 2580000003 HC RX 258: Performed by: NURSE ANESTHETIST, CERTIFIED REGISTERED

## 2024-01-26 PROCEDURE — 2500000003 HC RX 250 WO HCPCS: Performed by: NURSE ANESTHETIST, CERTIFIED REGISTERED

## 2024-01-26 PROCEDURE — 2709999900 HC NON-CHARGEABLE SUPPLY: Performed by: ORTHOPAEDIC SURGERY

## 2024-01-26 PROCEDURE — L1810 KO ELASTIC WITH JOINTS: HCPCS | Performed by: ORTHOPAEDIC SURGERY

## 2024-01-26 PROCEDURE — 6360000002 HC RX W HCPCS: Performed by: ANESTHESIOLOGY

## 2024-01-26 PROCEDURE — 64447 NJX AA&/STRD FEMORAL NRV IMG: CPT | Performed by: ANESTHESIOLOGY

## 2024-01-26 PROCEDURE — 6370000000 HC RX 637 (ALT 250 FOR IP): Performed by: NURSE ANESTHETIST, CERTIFIED REGISTERED

## 2024-01-26 PROCEDURE — 6360000002 HC RX W HCPCS: Performed by: PHYSICIAN ASSISTANT

## 2024-01-26 PROCEDURE — 73560 X-RAY EXAM OF KNEE 1 OR 2: CPT

## 2024-01-26 PROCEDURE — C1769 GUIDE WIRE: HCPCS | Performed by: ORTHOPAEDIC SURGERY

## 2024-01-26 PROCEDURE — 3700000001 HC ADD 15 MINUTES (ANESTHESIA): Performed by: ORTHOPAEDIC SURGERY

## 2024-01-26 PROCEDURE — 7100000011 HC PHASE II RECOVERY - ADDTL 15 MIN: Performed by: ORTHOPAEDIC SURGERY

## 2024-01-26 PROCEDURE — 2720000010 HC SURG SUPPLY STERILE: Performed by: ORTHOPAEDIC SURGERY

## 2024-01-26 PROCEDURE — 3600000002 HC SURGERY LEVEL 2 BASE: Performed by: ORTHOPAEDIC SURGERY

## 2024-01-26 PROCEDURE — 6370000000 HC RX 637 (ALT 250 FOR IP): Performed by: PHYSICIAN ASSISTANT

## 2024-01-26 PROCEDURE — 2580000003 HC RX 258: Performed by: PHYSICIAN ASSISTANT

## 2024-01-26 PROCEDURE — 7100000001 HC PACU RECOVERY - ADDTL 15 MIN: Performed by: ORTHOPAEDIC SURGERY

## 2024-01-26 PROCEDURE — L1830 KO IMMOB CANVAS LONG PRE OTS: HCPCS | Performed by: ORTHOPAEDIC SURGERY

## 2024-01-26 DEVICE — IMPLANTABLE DEVICE: Type: IMPLANTABLE DEVICE | Site: KNEE | Status: FUNCTIONAL

## 2024-01-26 RX ORDER — LIDOCAINE HYDROCHLORIDE 20 MG/ML
INJECTION, SOLUTION EPIDURAL; INFILTRATION; INTRACAUDAL; PERINEURAL PRN
Status: DISCONTINUED | OUTPATIENT
Start: 2024-01-26 | End: 2024-01-26 | Stop reason: SDUPTHER

## 2024-01-26 RX ORDER — CELECOXIB 100 MG/1
200 CAPSULE ORAL ONCE
Status: COMPLETED | OUTPATIENT
Start: 2024-01-26 | End: 2024-01-26

## 2024-01-26 RX ORDER — ROCURONIUM BROMIDE 10 MG/ML
INJECTION, SOLUTION INTRAVENOUS PRN
Status: DISCONTINUED | OUTPATIENT
Start: 2024-01-26 | End: 2024-01-26 | Stop reason: SDUPTHER

## 2024-01-26 RX ORDER — LABETALOL HYDROCHLORIDE 5 MG/ML
10 INJECTION, SOLUTION INTRAVENOUS
Status: DISCONTINUED | OUTPATIENT
Start: 2024-01-26 | End: 2024-01-26 | Stop reason: HOSPADM

## 2024-01-26 RX ORDER — KETOROLAC TROMETHAMINE 15 MG/ML
30 INJECTION, SOLUTION INTRAMUSCULAR; INTRAVENOUS ONCE
Status: COMPLETED | OUTPATIENT
Start: 2024-01-26 | End: 2024-01-26

## 2024-01-26 RX ORDER — FENTANYL CITRATE 50 UG/ML
INJECTION, SOLUTION INTRAMUSCULAR; INTRAVENOUS PRN
Status: DISCONTINUED | OUTPATIENT
Start: 2024-01-26 | End: 2024-01-26 | Stop reason: SDUPTHER

## 2024-01-26 RX ORDER — SODIUM CHLORIDE 0.9 % (FLUSH) 0.9 %
5-40 SYRINGE (ML) INJECTION EVERY 12 HOURS SCHEDULED
Status: DISCONTINUED | OUTPATIENT
Start: 2024-01-26 | End: 2024-01-26 | Stop reason: HOSPADM

## 2024-01-26 RX ORDER — PHENYLEPHRINE HCL IN 0.9% NACL 1 MG/10 ML
SYRINGE (ML) INTRAVENOUS PRN
Status: DISCONTINUED | OUTPATIENT
Start: 2024-01-26 | End: 2024-01-26 | Stop reason: SDUPTHER

## 2024-01-26 RX ORDER — DIPHENHYDRAMINE HYDROCHLORIDE 50 MG/ML
12.5 INJECTION INTRAMUSCULAR; INTRAVENOUS
Status: DISCONTINUED | OUTPATIENT
Start: 2024-01-26 | End: 2024-01-26 | Stop reason: HOSPADM

## 2024-01-26 RX ORDER — SODIUM CHLORIDE 0.9 % (FLUSH) 0.9 %
5-40 SYRINGE (ML) INJECTION PRN
Status: DISCONTINUED | OUTPATIENT
Start: 2024-01-26 | End: 2024-01-26 | Stop reason: HOSPADM

## 2024-01-26 RX ORDER — ACETAMINOPHEN 325 MG/1
1000 TABLET ORAL ONCE
Status: COMPLETED | OUTPATIENT
Start: 2024-01-26 | End: 2024-01-26

## 2024-01-26 RX ORDER — ONDANSETRON 2 MG/ML
INJECTION INTRAMUSCULAR; INTRAVENOUS PRN
Status: DISCONTINUED | OUTPATIENT
Start: 2024-01-26 | End: 2024-01-26 | Stop reason: SDUPTHER

## 2024-01-26 RX ORDER — MIDAZOLAM HYDROCHLORIDE 2 MG/2ML
2 INJECTION, SOLUTION INTRAMUSCULAR; INTRAVENOUS ONCE
Status: COMPLETED | OUTPATIENT
Start: 2024-01-26 | End: 2024-01-26

## 2024-01-26 RX ORDER — GABAPENTIN 100 MG/1
300 CAPSULE ORAL ONCE
Status: COMPLETED | OUTPATIENT
Start: 2024-01-26 | End: 2024-01-26

## 2024-01-26 RX ORDER — ROPIVACAINE HYDROCHLORIDE 5 MG/ML
INJECTION, SOLUTION EPIDURAL; INFILTRATION; PERINEURAL
Status: COMPLETED | OUTPATIENT
Start: 2024-01-26 | End: 2024-01-26

## 2024-01-26 RX ORDER — BUPIVACAINE HYDROCHLORIDE 5 MG/ML
INJECTION, SOLUTION EPIDURAL; INTRACAUDAL PRN
Status: DISCONTINUED | OUTPATIENT
Start: 2024-01-26 | End: 2024-01-26 | Stop reason: ALTCHOICE

## 2024-01-26 RX ORDER — ONDANSETRON 2 MG/ML
4 INJECTION INTRAMUSCULAR; INTRAVENOUS
Status: COMPLETED | OUTPATIENT
Start: 2024-01-26 | End: 2024-01-26

## 2024-01-26 RX ORDER — DEXAMETHASONE SODIUM PHOSPHATE 4 MG/ML
INJECTION, SOLUTION INTRA-ARTICULAR; INTRALESIONAL; INTRAMUSCULAR; INTRAVENOUS; SOFT TISSUE PRN
Status: DISCONTINUED | OUTPATIENT
Start: 2024-01-26 | End: 2024-01-26 | Stop reason: SDUPTHER

## 2024-01-26 RX ORDER — ROPIVACAINE HYDROCHLORIDE 5 MG/ML
INJECTION, SOLUTION EPIDURAL; INFILTRATION; PERINEURAL
Status: COMPLETED
Start: 2024-01-26 | End: 2024-01-26

## 2024-01-26 RX ORDER — FENTANYL CITRATE 50 UG/ML
25 INJECTION, SOLUTION INTRAMUSCULAR; INTRAVENOUS EVERY 5 MIN PRN
Status: DISCONTINUED | OUTPATIENT
Start: 2024-01-26 | End: 2024-01-26 | Stop reason: HOSPADM

## 2024-01-26 RX ORDER — LIDOCAINE HYDROCHLORIDE 10 MG/ML
5 INJECTION, SOLUTION EPIDURAL; INFILTRATION; INTRACAUDAL; PERINEURAL
Status: DISCONTINUED | OUTPATIENT
Start: 2024-01-26 | End: 2024-01-26 | Stop reason: HOSPADM

## 2024-01-26 RX ORDER — PROPOFOL 10 MG/ML
INJECTION, EMULSION INTRAVENOUS PRN
Status: DISCONTINUED | OUTPATIENT
Start: 2024-01-26 | End: 2024-01-26 | Stop reason: SDUPTHER

## 2024-01-26 RX ORDER — SUCCINYLCHOLINE/SOD CL,ISO/PF 100 MG/5ML
SYRINGE (ML) INTRAVENOUS PRN
Status: DISCONTINUED | OUTPATIENT
Start: 2024-01-26 | End: 2024-01-26 | Stop reason: SDUPTHER

## 2024-01-26 RX ORDER — ESMOLOL HYDROCHLORIDE 10 MG/ML
INJECTION INTRAVENOUS PRN
Status: DISCONTINUED | OUTPATIENT
Start: 2024-01-26 | End: 2024-01-26 | Stop reason: SDUPTHER

## 2024-01-26 RX ORDER — FENTANYL CITRATE 50 UG/ML
100 INJECTION, SOLUTION INTRAMUSCULAR; INTRAVENOUS ONCE
Status: COMPLETED | OUTPATIENT
Start: 2024-01-26 | End: 2024-01-26

## 2024-01-26 RX ORDER — NEOSTIGMINE METHYLSULFATE 1 MG/ML
INJECTION, SOLUTION INTRAVENOUS PRN
Status: DISCONTINUED | OUTPATIENT
Start: 2024-01-26 | End: 2024-01-26 | Stop reason: SDUPTHER

## 2024-01-26 RX ORDER — DEXTROSE MONOHYDRATE 100 MG/ML
INJECTION, SOLUTION INTRAVENOUS CONTINUOUS PRN
Status: DISCONTINUED | OUTPATIENT
Start: 2024-01-26 | End: 2024-01-26 | Stop reason: HOSPADM

## 2024-01-26 RX ORDER — GLYCOPYRROLATE 0.2 MG/ML
INJECTION INTRAMUSCULAR; INTRAVENOUS PRN
Status: DISCONTINUED | OUTPATIENT
Start: 2024-01-26 | End: 2024-01-26 | Stop reason: SDUPTHER

## 2024-01-26 RX ORDER — SODIUM CHLORIDE, SODIUM LACTATE, POTASSIUM CHLORIDE, CALCIUM CHLORIDE 600; 310; 30; 20 MG/100ML; MG/100ML; MG/100ML; MG/100ML
INJECTION, SOLUTION INTRAVENOUS CONTINUOUS
Status: DISCONTINUED | OUTPATIENT
Start: 2024-01-26 | End: 2024-01-26 | Stop reason: HOSPADM

## 2024-01-26 RX ORDER — SODIUM CHLORIDE 9 MG/ML
INJECTION, SOLUTION INTRAVENOUS PRN
Status: DISCONTINUED | OUTPATIENT
Start: 2024-01-26 | End: 2024-01-26 | Stop reason: HOSPADM

## 2024-01-26 RX ORDER — ROPIVACAINE HYDROCHLORIDE 2 MG/ML
30 INJECTION, SOLUTION EPIDURAL; INFILTRATION; PERINEURAL ONCE
Status: DISCONTINUED | OUTPATIENT
Start: 2024-01-26 | End: 2024-01-26 | Stop reason: HOSPADM

## 2024-01-26 RX ORDER — OXYCODONE HYDROCHLORIDE 5 MG/1
5 TABLET ORAL
Status: DISCONTINUED | OUTPATIENT
Start: 2024-01-26 | End: 2024-01-26 | Stop reason: HOSPADM

## 2024-01-26 RX ORDER — FAMOTIDINE 20 MG/1
20 TABLET, FILM COATED ORAL ONCE
Status: COMPLETED | OUTPATIENT
Start: 2024-01-26 | End: 2024-01-26

## 2024-01-26 RX ADMIN — SODIUM CHLORIDE, SODIUM LACTATE, POTASSIUM CHLORIDE, AND CALCIUM CHLORIDE: 600; 310; 30; 20 INJECTION, SOLUTION INTRAVENOUS at 06:46

## 2024-01-26 RX ADMIN — FENTANYL CITRATE 100 MCG: 50 INJECTION, SOLUTION INTRAMUSCULAR; INTRAVENOUS at 07:13

## 2024-01-26 RX ADMIN — Medication 100 MCG: at 07:42

## 2024-01-26 RX ADMIN — MIDAZOLAM 2 MG: 1 INJECTION INTRAMUSCULAR; INTRAVENOUS at 07:13

## 2024-01-26 RX ADMIN — ESMOLOL HYDROCHLORIDE 20 MG: 10 INJECTION, SOLUTION INTRAVENOUS at 08:54

## 2024-01-26 RX ADMIN — FENTANYL CITRATE 50 MCG: 50 INJECTION INTRAMUSCULAR; INTRAVENOUS at 07:45

## 2024-01-26 RX ADMIN — KETOROLAC TROMETHAMINE 30 MG: 15 INJECTION, SOLUTION INTRAMUSCULAR; INTRAVENOUS at 09:36

## 2024-01-26 RX ADMIN — PROPOFOL 150 MG: 10 INJECTION, EMULSION INTRAVENOUS at 07:31

## 2024-01-26 RX ADMIN — FENTANYL CITRATE 50 MCG: 50 INJECTION INTRAMUSCULAR; INTRAVENOUS at 08:48

## 2024-01-26 RX ADMIN — ROCURONIUM BROMIDE 10 MG: 10 INJECTION, SOLUTION INTRAVENOUS at 07:31

## 2024-01-26 RX ADMIN — ONDANSETRON 4 MG: 2 INJECTION INTRAMUSCULAR; INTRAVENOUS at 09:44

## 2024-01-26 RX ADMIN — ROPIVACAINE HYDROCHLORIDE 30 ML: 5 INJECTION EPIDURAL; INFILTRATION; PERINEURAL at 07:18

## 2024-01-26 RX ADMIN — ESMOLOL HYDROCHLORIDE 30 MG: 10 INJECTION, SOLUTION INTRAVENOUS at 07:31

## 2024-01-26 RX ADMIN — ACETAMINOPHEN 325MG 975 MG: 325 TABLET ORAL at 06:38

## 2024-01-26 RX ADMIN — LIDOCAINE HYDROCHLORIDE 100 MG: 20 INJECTION, SOLUTION EPIDURAL; INFILTRATION; INTRACAUDAL; PERINEURAL at 07:31

## 2024-01-26 RX ADMIN — GABAPENTIN 300 MG: 100 CAPSULE ORAL at 06:38

## 2024-01-26 RX ADMIN — CELECOXIB 200 MG: 100 CAPSULE ORAL at 06:38

## 2024-01-26 RX ADMIN — ROCURONIUM BROMIDE 40 MG: 10 INJECTION, SOLUTION INTRAVENOUS at 07:35

## 2024-01-26 RX ADMIN — ONDANSETRON 4 MG: 2 INJECTION INTRAMUSCULAR; INTRAVENOUS at 08:44

## 2024-01-26 RX ADMIN — DEXAMETHASONE SODIUM PHOSPHATE 8 MG: 4 INJECTION INTRA-ARTICULAR; INTRALESIONAL; INTRAMUSCULAR; INTRAVENOUS; SOFT TISSUE at 07:31

## 2024-01-26 RX ADMIN — GLYCOPYRROLATE 0.2 MG: 0.2 INJECTION INTRAMUSCULAR; INTRAVENOUS at 07:28

## 2024-01-26 RX ADMIN — Medication 100 MG: at 07:31

## 2024-01-26 RX ADMIN — FAMOTIDINE 20 MG: 20 TABLET ORAL at 06:38

## 2024-01-26 RX ADMIN — Medication 5 MG: at 08:44

## 2024-01-26 RX ADMIN — FENTANYL CITRATE 25 MCG: 50 INJECTION, SOLUTION INTRAMUSCULAR; INTRAVENOUS at 09:45

## 2024-01-26 RX ADMIN — ONDANSETRON 4 MG: 2 INJECTION INTRAMUSCULAR; INTRAVENOUS at 07:31

## 2024-01-26 RX ADMIN — WATER 2000 MG: 1 INJECTION INTRAMUSCULAR; INTRAVENOUS; SUBCUTANEOUS at 07:28

## 2024-01-26 RX ADMIN — GLYCOPYRROLATE 0.8 MG: 0.2 INJECTION INTRAMUSCULAR; INTRAVENOUS at 08:44

## 2024-01-26 RX ADMIN — SODIUM CHLORIDE, SODIUM LACTATE, POTASSIUM CHLORIDE, AND CALCIUM CHLORIDE: 600; 310; 30; 20 INJECTION, SOLUTION INTRAVENOUS at 08:24

## 2024-01-26 ASSESSMENT — PAIN - FUNCTIONAL ASSESSMENT
PAIN_FUNCTIONAL_ASSESSMENT: ACTIVITIES ARE NOT PREVENTED
PAIN_FUNCTIONAL_ASSESSMENT: ACTIVITIES ARE NOT PREVENTED
PAIN_FUNCTIONAL_ASSESSMENT: 0-10
PAIN_FUNCTIONAL_ASSESSMENT: ACTIVITIES ARE NOT PREVENTED

## 2024-01-26 ASSESSMENT — PAIN SCALES - GENERAL
PAINLEVEL_OUTOF10: 4
PAINLEVEL_OUTOF10: 5
PAINLEVEL_OUTOF10: 2
PAINLEVEL_OUTOF10: 5

## 2024-01-26 ASSESSMENT — PAIN DESCRIPTION - PAIN TYPE
TYPE: SURGICAL PAIN

## 2024-01-26 ASSESSMENT — PAIN DESCRIPTION - LOCATION
LOCATION: KNEE
LOCATION: KNEE
LOCATION: ANKLE
LOCATION: KNEE

## 2024-01-26 ASSESSMENT — PAIN DESCRIPTION - ORIENTATION
ORIENTATION: LEFT

## 2024-01-26 ASSESSMENT — PAIN DESCRIPTION - DESCRIPTORS
DESCRIPTORS: ACHING;HEAVINESS
DESCRIPTORS: ACHING
DESCRIPTORS: HEAVINESS

## 2024-01-26 NOTE — ANESTHESIA PRE PROCEDURE
Department of Anesthesiology  Preprocedure Note       Name:  Celine Ramirez   Age:  53 y.o.  :  1970                                          MRN:  [id number]         Date:  2024      Surgeon: Surgeon(s):  Jacob Jackson MD    Procedure: Procedure(s):  LEFT PATELLA OPEN REDUCTION INTERNAL FIXATION; C-ARM; [[employer]]; NERVE BLOCK    Medications prior to admission:   Prior to Admission medications    Medication Sig Start Date End Date Taking? Authorizing Provider   aspirin 81 MG chewable tablet Take 1 tablet by mouth daily   Yes Provider, MD Bella   Cholecalciferol 1.25 MG (13295 UT) TABS Take by mouth daily    ProviderBella MD   ibuprofen (IBU) 400 MG tablet Take 1 tablet by mouth every 6 hours as needed for Pain  Patient not taking: Reported on 2024   Adilson Alvares MD   acetaminophen (TYLENOL) 500 MG tablet Take 2 tablets by mouth 4 times daily as needed for Pain 24   Adilson Alvares MD   gabapentin (NEURONTIN) 100 MG capsule Take 1 capsule by mouth 3 times daily for 30 days. Attending ESTELLA Champagne [id number] Max Daily Amount: 300 mg  Patient not taking: Reported on 2024  Michelle Larsen PA   rivaroxaban (XARELTO) 10 MG TABS tablet Take 1 tablet by mouth daily (with breakfast)  Patient not taking: Reported on 2024   Michelle Larsen PA   HYDROcodone-acetaminophen (NORCO)  MG per tablet Take 1 tablet by mouth every 4 hours as needed for Pain for up to 10 days. Take lowest dose possible to manage pain for acute post operative pain. Max Daily Amount: 6 tablets  Patient not taking: Reported on 2024  Michelle Larsen PA   simvastatin (ZOCOR) 20 MG tablet TAKE ONE TABLET BY MOUTH EVERY NIGHT 10/12/23   Narcisa León MD   levothyroxine (SYNTHROID) 125 MCG tablet TAKE ONE TABLET BY MOUTH EVERY MORNING BEFORE BREAKFAST 23   Narcisa León MD   levothyroxine (SYNTHROID) 125

## 2024-01-26 NOTE — ANESTHESIA POSTPROCEDURE EVALUATION
Department of Anesthesiology  Postprocedure Note    Patient: Celine Ramirez  MRN: [id number]  YOB: 1970  Date of evaluation: 1/26/2024    Procedure Summary       Date: 01/26/24 Room / Location: [facility] MAIN 03 / [facility] MAIN OR    Anesthesia Start: 0728 Anesthesia Stop: 0914    Procedure: LEFT PATELLA OPEN REDUCTION INTERNAL FIXATION; C-ARM; [[employer]]; NERVE BLOCK (Left: Knee) Diagnosis:       Closed displaced transverse fracture of left patella, initial encounter      (Closed displaced transverse fracture of left patella, initial encounter [S82.032A])    Surgeons: Jacob Jackson MD Responsible Provider: Derrick Matta DO    Anesthesia Type: General ASA Status: 3            Anesthesia Type: General    Basil Phase I: Basil Score: 10    Basil Phase II:      Anesthesia Post Evaluation    Patient location during evaluation: PACU  Patient participation: complete - patient participated  Level of consciousness: awake and alert  Airway patency: patent  Nausea & Vomiting: no nausea and no vomiting  Cardiovascular status: hemodynamically stable  Respiratory status: acceptable  Hydration status: stable  Multimodal analgesia pain management approach    No notable events documented.

## 2024-01-26 NOTE — BRIEF OP NOTE
Brief Postoperative Note      Patient: Celine Ramirez  YOB: 1970  MRN: [id number]    Date of Procedure: 1/26/2024    Pre-Op Diagnosis Codes:     * Closed displaced transverse fracture of left patella, initial encounter [S82.032A]    Post-Op Diagnosis: Same       Procedure(s):  LEFT PATELLA OPEN REDUCTION INTERNAL FIXATION; C-ARM; [[employer]]; NERVE BLOCK    Surgeon(s):  Jacob Jackson MD    Assistant:  Surgical Assistant: Elio Morales  Physician Assistant: Michelle Larsen PA    Anesthesia: General    Estimated Blood Loss (mL): Minimal    Complications: None    Specimens:   * No specimens in log *    Implants:  Implant Name Type Inv. Item Serial No.  Lot No. LRB No. Used Action   SCREW BNE L38MM DIA4MM PAT STEPHANIE BLNT TIP LO PROF FOR FRAC - [id number]  SCREW BNE L38MM DIA4MM PAT STEPHANIE BLNT TIP LO PROF FOR FRAC  [employer] NA Left 1 Implanted   SCREW BNE L36MM DIA4MM PAT STEPHANIE BLNT TIP LO PROF FOR FRAC - [id number]  SCREW BNE L36MM DIA4MM PAT STEPHANIE BLNT TIP LO PROF FOR FRAC  [employer] NA Left 1 Implanted         Drains: * No LDAs found *    Findings: displaced distal third patella fracture      Electronically signed by DOUGIE Busby on 1/26/2024 at 9:18 AM

## 2024-01-26 NOTE — H&P
Update History & Physical    The patient's History and Physical was reviewed with the patient and I examined the patient. There was no change. The surgical site was confirmed by the patient and me.       Plan: The risks, benefits, expected outcome, and alternative to the recommended procedure have been discussed with the patient. Patient understands and wants to proceed with the procedure.     Electronically signed by Jacob Jackson MD on 1/26/2024 at 7:17 AM

## 2024-01-26 NOTE — OP NOTE
Operative Note      Patient: Celine Ramirez  YOB: 1970  MRN: [id number]    Date of Procedure: 1/26/2024    Pre-Op Diagnosis Codes:     * Closed displaced transverse fracture of left patella, initial encounter [S82.032A]    Post-Op Diagnosis: Same       Procedure(s):  LEFT PATELLA OPEN REDUCTION INTERNAL FIXATION; C-ARM; [[employer]]; NERVE BLOCK    Surgeon(s):  Jacob Jackson MD    Assistant:   Surgical Assistant: Elio Morales  Physician Assistant: Michelle Larsen PA    Anesthesia: General    Estimated Blood Loss (mL): Minimal    Complications: None    Specimens:   * No specimens in log *    Implants:  * No implants in log *      Drains: * No LDAs found *    Findings: Distal third displaced patella fracture        Detailed Description of Procedure:   Patient was taken to the operating room [place] under general trach anesthesia with anesthesia staff.  Placed in supine position with the left leg prepped with ChloraPrep solution draped free sterile field.  Midline incision was made with subcutaneous tissue dissected sharply to the level of the peritenon and bursa which was incised in line with the incision the fracture was exposed subperiosteally and fracture hematoma was removed by profuse irrigation.  Fracture was then reduced and 2 parallel pins placed from distal to proximal this was followed by a drill over the pins and then the screws were measured and 2 screws self-tapping screws lag screws were placed across the fracture site and secured effecting very stable reduction.  Using the [employer] system fiber tape suture was placed through the lateral screw attached to a needle exiting distal and then brought to the second screw from proximal to distal again and then tied in a figure-of-eight configuration effecting a very stable anatomic tension band technique.  The periosteum or above the fracture site was closed with FiberWire suture #2 medial lateral retinaculum's were

## 2024-01-26 NOTE — PERIOP NOTE
Patient /Family /Designee has been informed that [facility] is not responsible for patient belongings per policy and the signed [facility] Patient Agreement document.  Personal items should be sent home or checked in with security.  Patient /Family /Designee selected the following action:                            [x]  Send personal items home with a family member,-[facility]                                                 []  Check in personal items with security, excluding clothing                            []  Maintain personal items at the bedside, against recommendation                                 by Chi Mercer [facility]                                   ** If patient /family /designee chooses to maintain personal items at the bedside,                                      Complete the patient belongings inventory in the EMR.

## 2024-01-26 NOTE — ANESTHESIA PROCEDURE NOTES
Peripheral Block    Patient location during procedure: pre-op  Reason for block: post-op pain management and at surgeon's request  Start time: 1/26/2024 7:12 AM  End time: 1/26/2024 7:18 AM  Staffing  Performed: anesthesiologist   Anesthesiologist: Derrick Matta DO  Performed by: Derrick Matta DO  Authorized by: Derrick Matta DO    Preanesthetic Checklist  Completed: patient identified, IV checked, site marked, risks and benefits discussed, surgical/procedural consents, equipment checked, pre-op evaluation, timeout performed, anesthesia consent given, oxygen available, monitors applied/VS acknowledged, fire risk safety assessment completed and verbalized and blood product R/B/A discussed and consented  Peripheral Block   Patient position: supine  Prep: ChloraPrep  Provider prep: mask and sterile gloves  Patient monitoring: cardiac monitor, continuous pulse ox, frequent blood pressure checks, IV access, oxygen and responsive to questions  Block type: Femoral  Femoral crease  Laterality: left  Injection technique: single-shot  Guidance: nerve stimulator and ultrasound guided    Needle   Needle type: insulated echogenic nerve stimulator needle   Needle gauge: 21 G  Needle localization: nerve stimulator and ultrasound guidance  Needle length: 10 cm  Assessment   Injection assessment: negative aspiration for heme, no paresthesia on injection, local visualized surrounding nerve on ultrasound and no intravascular symptoms  Paresthesia pain: none  Slow fractionated injection: yes  Hemodynamics: stable  Real-time US image taken/store: yes  Outcomes: patient tolerated procedure well    Medications Administered  ropivacaine (NAROPIN) injection 0.5% - Perineural   30 mL - 1/26/2024 7:18:00 AM

## 2024-01-26 NOTE — PROGRESS NOTES
Per Michelle, the patient may remove the ace wrap tomorrow and leave the water proof bandage on. The patient may shower tomorrow.

## 2024-01-29 ENCOUNTER — CARE COORDINATION (OUTPATIENT)
Dept: OTHER | Facility: CLINIC | Age: 54
End: 2024-01-29

## 2024-01-29 ENCOUNTER — TELEPHONE (OUTPATIENT)
Age: 54
End: 2024-01-29

## 2024-01-29 NOTE — TELEPHONE ENCOUNTER
Ace wrap is optional. Only for comfort with her brace. Leave bandage on. If it does start peeling off she can remove fully but ideally I would like it to stay on until post op visit

## 2024-01-29 NOTE — TELEPHONE ENCOUNTER
Patient  had surgery on Friday, she called and left a voicemail message asking for instructions on her wrap after showering. She also asked about her [facility] paperwork. Please call patient to advise. [phone]

## 2024-01-29 NOTE — CARE COORDINATION
Ambulatory Care Coordination Note    ACM attempted to reach patient for care management follow up call regarding post op call from surgery on 1/26/24 . HIPAA compliant message left requesting a return phone call at patient convenience.     Plan for follow-up call in 3-5 days    Future Appointments   Date Time Provider Department Center   1/31/2024  1:30 PM Michelle Larsen PA [facility] JAKOB JENKINS, RN- [facility]  Associate Care Manager  [phone]  [email or web address]

## 2024-01-30 ENCOUNTER — CARE COORDINATION (OUTPATIENT)
Dept: OTHER | Facility: CLINIC | Age: 54
End: 2024-01-30

## 2024-01-30 NOTE — CARE COORDINATION
Ambulatory Care Coordination Note  2024    Patient Current Location:  Home: [address] [facility] [address]     ACM contacted the patient by telephone. Verified name and  with patient as identifiers. Provided introduction to self, and explanation of the ACM role.     Challenges to be reviewed by the provider   Additional needs identified to be addressed with provider: No  none               Method of communication with provider: none.    ACM: Mariana De Paz RN  Pt is doing better today. She said her pain is tolerable around ad 3 or 4 /10. She has pain medication. She is eating and drinking fine. She is using a FWW for ambulation. She has great support at home. Pt has a follow up. tomorrow with surgeon. Pt said she has no questions or concerns today. Will follow up   Offered patient enrollment in the Remote Patient Monitoring (RPM) program for in-home monitoring:  n/a .        Care Coordination Interventions    Referral from Primary Care Provider: No  Suggested Interventions and Community Resources          Goals Addressed                   This Visit's Progress     Conditions and Symptoms   On track     I will schedule office visits, as directed by my provider.  I will keep my appointment or reschedule if I have to cancel.  I will notify my provider of any barriers to my plan of care.  I will notify my provider of any symptoms that indicate a worsening of my condition.    Barriers: overwhelmed by complexity of regimen  Plan for overcoming my barriers: Continue to openly engage with ACM and providers to promote success in reaching goals    Confidence: 10/10  Anticipated Goal Completion Date: 24 and ongoing                Future Appointments   Date Time Provider Department Center   2024  1:30 PM Michelle Larsen PA [facility]

## 2024-01-30 NOTE — PROGRESS NOTES
Celine Ramirez  1970     HISTORY OF PRESENT ILLNESS  Celine Ramirez is a 53 y.o. female who presents today for evaluation s/p ORIF left patella on 24. Patient pain is a 3/10.  Pain is now more controlled with the gabapentin 3 times a day and to the Norco 10.  Initially she had quite a bit of swelling and discomfort.  She has been wearing a brace and has been nonweightbearing.  Has been taking her Xarelto as directed.  Patient denies any fever, chills, chest pain, shortness of breath or calf pain. The remainder of the review of systems is negative. There are no new illness or injuries to report since last seen in the office. No changes in medications, allergies, social or family history.      PHYSICAL EXAM:   There were no vitals taken for this visit.   The patient is a well-developed, well-nourished female in no acute distress.  The patient is alert and oriented times three. The patient appears to be well groomed. Mood and affect are normal.  ORTHOPEDIC EXAM of left knee:  Inspection:  effusion present, bruising present, edema not present   Incision, clean, dry, intact, sutures in place, small area of red granulation tissue distal third of incision  Range of motion: 0-30  ttp diffuse  Stability: Stable  Strength: n/a/5  Lag with straight leg raise    Imagin view xray images of left knee taken in the office on 2024 read and reviewed by myself reveal reduced patella fracture with hardware in place     IMPRESSION:      ICD-10-CM    1. Closed displaced transverse fracture of left patella, initial encounter  S82.032A            PLAN:   Patient doing well postoperatively  Surgery discussed at length  Dressings removed and incisions cleaned - will monitor incision closely  Brace set from 0-30 degrees today.  Remain toe touch weight bearing.  We will set her up with [employer]  Continue with Xarelto until she is 14 days postop.  She then will transition to one 325 mg aspirin 
no

## 2024-01-31 ENCOUNTER — OFFICE VISIT (OUTPATIENT)
Age: 54
End: 2024-01-31
Payer: COMMERCIAL

## 2024-01-31 VITALS — BODY MASS INDEX: 25.83 KG/M2 | WEIGHT: 155 LBS | HEIGHT: 65 IN | TEMPERATURE: 98.5 F

## 2024-01-31 DIAGNOSIS — S82.032A CLOSED DISPLACED TRANSVERSE FRACTURE OF LEFT PATELLA, INITIAL ENCOUNTER: ICD-10-CM

## 2024-01-31 DIAGNOSIS — S82.032A CLOSED DISPLACED TRANSVERSE FRACTURE OF LEFT PATELLA, INITIAL ENCOUNTER: Primary | ICD-10-CM

## 2024-01-31 PROCEDURE — 73560 X-RAY EXAM OF KNEE 1 OR 2: CPT | Performed by: PHYSICIAN ASSISTANT

## 2024-01-31 PROCEDURE — 99024 POSTOP FOLLOW-UP VISIT: CPT | Performed by: PHYSICIAN ASSISTANT

## 2024-01-31 RX ORDER — GABAPENTIN 100 MG/1
CAPSULE ORAL
Qty: 90 CAPSULE | OUTPATIENT
Start: 2024-01-31

## 2024-01-31 RX ORDER — HYDROCODONE BITARTRATE AND ACETAMINOPHEN 10; 325 MG/1; MG/1
1 TABLET ORAL EVERY 4 HOURS PRN
Qty: 40 TABLET | Refills: 0 | Status: SHIPPED | OUTPATIENT
Start: 2024-01-31 | End: 2024-02-07

## 2024-01-31 RX ORDER — GABAPENTIN 100 MG/1
100 CAPSULE ORAL 3 TIMES DAILY
Qty: 90 CAPSULE | Refills: 0 | Status: SHIPPED | OUTPATIENT
Start: 2024-01-31 | End: 2024-03-01

## 2024-01-31 NOTE — TELEPHONE ENCOUNTER
Patient's  [facility] called and asked to speak directly with DOUGIE Larsen regarding the patient's refill on pain medication.    He says they're being told that its too soon to refill but also says they were told by the On-Call doctor to increase the dosage and she ran out quicker.    Please advise and contact them back as soon as possible.    He's requesting a call back at [phone].

## 2024-02-02 ENCOUNTER — HOME HEALTH ADMISSION (OUTPATIENT)
Age: 54
End: 2024-02-02
Payer: COMMERCIAL

## 2024-02-02 ENCOUNTER — CLINICAL DOCUMENTATION (OUTPATIENT)
Age: 54
End: 2024-02-02

## 2024-02-02 NOTE — PROGRESS NOTES
Forms completed, informed patient that it was completed. Patient did give me fax number to fax paper work off

## 2024-02-06 ENCOUNTER — HOME CARE VISIT (OUTPATIENT)
Age: 54
End: 2024-02-06

## 2024-02-06 ENCOUNTER — HOME CARE VISIT (OUTPATIENT)
Age: 54
End: 2024-02-06
Payer: COMMERCIAL

## 2024-02-06 VITALS
TEMPERATURE: 97 F | OXYGEN SATURATION: 100 % | SYSTOLIC BLOOD PRESSURE: 122 MMHG | RESPIRATION RATE: 18 BRPM | DIASTOLIC BLOOD PRESSURE: 64 MMHG | HEART RATE: 86 BPM

## 2024-02-06 VITALS
RESPIRATION RATE: 16 BRPM | SYSTOLIC BLOOD PRESSURE: 122 MMHG | DIASTOLIC BLOOD PRESSURE: 64 MMHG | TEMPERATURE: 97 F | OXYGEN SATURATION: 100 % | HEART RATE: 80 BPM

## 2024-02-06 PROCEDURE — G0151 HHCP-SERV OF PT,EA 15 MIN: HCPCS

## 2024-02-06 PROCEDURE — G0299 HHS/HOSPICE OF RN EA 15 MIN: HCPCS

## 2024-02-06 ASSESSMENT — ENCOUNTER SYMPTOMS
DYSPNEA ACTIVITY LEVEL: AFTER AMBULATING 10 - 20 FT
PAIN LOCATION - PAIN QUALITY: DULL ACHING
PAIN LOCATION - PAIN QUALITY: DULL, ACHE
HEMOPTYSIS: 0

## 2024-02-06 NOTE — HOME HEALTH
PMHx: H+P per office visit 1/31/24 Celine Ramirez is a 53 y.o. female who presents today for evaluation s/p ORIF left patella on 1/24/24. Patient pain is a 3/10.  Pain is now more controlled with the gabapentin 3 times a day and to the Norco 10.  Initially she had quite a bit of swelling and discomfort.  She has been wearing a brace and has been nonweightbearing.  Has been taking her Xarelto as directed.  Patient denies any fever, chills, chest pain, shortness of breath or calf pain. The remainder of the review of systems is negative. There are no new illness or injuries to report since last seen in the office. No changes in medications, allergies, social or family history.  List of Comorbidities:    Mitral valve prolapse  Essential hypertension  MAYES (nonalcoholic steatohepatitis)  GERD (gastroesophageal reflux disease)  Acquired hypothyroidism  IFG (impaired fasting glucose)  Acne vulgaris  Hyperlipidememia  Rosacea  Vitamin D deficience  Closed displaced transverse fracture of left patella    SUBJECTIVE: I am doing ok today. I am going back to see the MD flowers   LIVING SITUATION:  Pt lives with [facility] in a mulit level home with bedroom and primary bathroom are on the 2 floor with a flight of steps with 1 HR. pt has a 1/2 bath on the 1 st floor    3 steps in the garage with 2 wide railings. Pt has a sunken living room  with 1 step with 2 pony walls   REQUIRES CAREGIVER ASSISTANCE FOR: transportation, medications, ADSl IADLS   PLOF: Pt was I with amb without A DEV. Pt was I with dressing and bathing   MEDICATIONS REVIEWED AND RECONCILED: NO  changes   NEXT MD APPT:  Michelle Larsen PA 2/7/24  EQUIPMENT:RW 3 in one commode, shower chair. SPC   ROM:Pt is in L IROM brace 0-30 degees. Per MD ncrease rom on brace by 10 degrees every week.Gentle PROM of knee up to full rom  STRENGTH: R hip flexors, quads hamstrings DF? PF 5/5  L DF/PF 5/5  L hip flexors 2/5 L knee NA   WOUNDS: L knee bandge . Clean dry and

## 2024-02-07 ENCOUNTER — OFFICE VISIT (OUTPATIENT)
Age: 54
End: 2024-02-07
Payer: COMMERCIAL

## 2024-02-07 ENCOUNTER — CARE COORDINATION (OUTPATIENT)
Dept: OTHER | Facility: CLINIC | Age: 54
End: 2024-02-07

## 2024-02-07 VITALS — HEIGHT: 65 IN | WEIGHT: 155 LBS | BODY MASS INDEX: 25.83 KG/M2

## 2024-02-07 DIAGNOSIS — S82.032A CLOSED DISPLACED TRANSVERSE FRACTURE OF LEFT PATELLA, INITIAL ENCOUNTER: Primary | ICD-10-CM

## 2024-02-07 PROCEDURE — 99024 POSTOP FOLLOW-UP VISIT: CPT | Performed by: PHYSICIAN ASSISTANT

## 2024-02-07 PROCEDURE — 73560 X-RAY EXAM OF KNEE 1 OR 2: CPT | Performed by: PHYSICIAN ASSISTANT

## 2024-02-07 NOTE — PROGRESS NOTES
Celine Ramirez  1970     HISTORY OF PRESENT ILLNESS  Celine Ramirez is a 53 y.o. female who presents today for evaluation s/p ORIF left patella on 24. Patient pain is a 1/10.  Pain is very controlled.   Has been taking her Xarelto as directed.  Patient denies any fever, chills, chest pain, shortness of breath or calf pain. The remainder of the review of systems is negative. There are no new illness or injuries to report since last seen in the office. No changes in medications, allergies, social or family history.      PHYSICAL EXAM:   There were no vitals taken for this visit.   The patient is a well-developed, well-nourished female in no acute distress.  The patient is alert and oriented times three. The patient appears to be well groomed. Mood and affect are normal.  ORTHOPEDIC EXAM of left knee:  Inspection:  effusion present, bruising present, edema not present   Incision, clean, dry, intact, sutures in place, healing well  Range of motion: 0-40  ttp diffuse  Stability: Stable  Strength: n/a/5  Lag with straight leg raise    Imagin view xray images of left knee taken in the office on 2024 read and reviewed by myself reveal reduced patella fracture with hardware in place     IMPRESSION:      ICD-10-CM    1. Closed displaced transverse fracture of left patella, initial encounter  S82.032A            PLAN:   Patient doing well postoperatively  Dressings removed and incisions cleaned - will monitor incision closely  Brace set from 0-50 degrees today.  Remain toe touch weight bearing.    Continue with Xarelto until she is 14 days postop.  She then will transition to one 325 mg aspirin a day    RTC 2 week for wound check and repeat xray    Michelle Larsen PA-C  [facility]

## 2024-02-07 NOTE — CARE COORDINATION
Ambulatory Care Coordination Note    ACM attempted to reach patient for care management follow up call regarding new order for home care Pt, pain level, wound status and outcome from follow up visit with Ortho. HIPAA compliant message left requesting a return phone call at patient convenience.     Plan for follow-up call in 3-5 days    Future Appointments   Date Time Provider Department Center   2/8/2024  2:00 PM Brooke Blakely PTA [facility] HOME HEAL   2/13/2024 To Be Determined Brooke Blakely, [place] [facility] HOME HEAL   2/13/2024 To Be Determined Deysi Youngblood RN [facility] HOME HEAL   2/16/2024 To Be Determined Brooke Blakely PTA [facility] HOME HEAL   2/20/2024 To Be Determined Brooke Blakely, PTA [facility] HOME HEAL   2/20/2024 To Be Determined Deysi Youngblood RN [facility] HOME HEAL   2/23/2024 To Be Determined Brooke Blakely PTA [facility] HOME HEAL   2/27/2024 To Be Determined Brooke Blakely PTA [facility] HOME HEAL   3/1/2024 To Be Determined Norma Hutton, PT [facility] HOME [facility]         Mariana JENKINS, RN- [facility]  Associate Care Manager  [phone]  [email or web address]

## 2024-02-07 NOTE — HOME HEALTH
instructed on plan of care and are agreeable to plan of care at this time.      Physician Michelle Larsen PA  notified of patient admission to home health 2/6/24 and plan of care including anticipated frequency of 1w3, 2 prn and treatments/interventions/modalities of teaching disease and medication management. L knee surgical wound care.    Next MD appointment 2/7/21 with Michelle Larsen PA  Patient/caregiver encouraged/instructed to keep appointment as lack of follow through with physician appointment could result in discontinuation of service.      Discharge planning discussed with patient and caregiver. Discharge planning as follows: Pt/CG will be able to manage disease and medication independently, pt reach his maximum level of function, L knee surgical epithelialize 100%  and health condition stable. Pt/Caregiver did verbalize understanding of discharge planning.

## 2024-02-08 ENCOUNTER — PATIENT MESSAGE (OUTPATIENT)
Age: 54
End: 2024-02-08

## 2024-02-08 ENCOUNTER — HOME CARE VISIT (OUTPATIENT)
Age: 54
End: 2024-02-08
Payer: COMMERCIAL

## 2024-02-08 ENCOUNTER — TELEPHONE (OUTPATIENT)
Age: 54
End: 2024-02-08

## 2024-02-08 PROCEDURE — G0157 HHC PT ASSISTANT EA 15: HCPCS

## 2024-02-08 NOTE — TELEPHONE ENCOUNTER
Post op patient called and said that her [employer] FMLA was declined .     Patient said that [employer] is giving her 5 days more. That the FMLA form that was received at the [facility] location on 01/24/2024 must have the Date Range of Leave from the Doctor and Sufficient Medical facts to Support Reason of Leave.     Patient is a [employer] Employee , and is afraid of losing her job. That [employer] is only giving her Five day to get the FMLA completed again and faxed back to them.    Patient tel. [phone].    Note : patient has an appt on 02/1/24 for the left patella with DOUGIE Larsen.

## 2024-02-08 NOTE — TELEPHONE ENCOUNTER
Post op patient called again and said that [employer] just sent a Medical Necessity Form to the [facility]  fax for  or DOUGIE Larsen to fill out.    Patient said that that she had surgery done not long ago by .     Patient said that [employer] if only giving her 5 days for the form to be filled out.     Patient tel. [phone].    Note : please see previous message.

## 2024-02-08 NOTE — TELEPHONE ENCOUNTER
des [facility] Clinical Staff (supporting DOUGIE Busby)1 hour ago (10:28 AM)       Олег Martinez! I called the office this morning. They denied my FMLA saying no length of time and no diagnosis. I asked them to have you fill it out and submit it. They are only giving me a 5 day extension.  I know your busy so sorry for the inconvenience.  Thanks, Celine

## 2024-02-08 NOTE — TELEPHONE ENCOUNTER
From: Celine Ramirez  Sent: 2/8/2024 11:16 AM EST  To: Dez Lugo [facility] Clinical Staff  Subject: Fmla    Hello again! I called [employer] they said the office submitted the return to work form . I asked them to fax over the medical necessity form and they said they did. That's the form I need the return fax is on the form. Thanks Celine

## 2024-02-09 VITALS
RESPIRATION RATE: 16 BRPM | OXYGEN SATURATION: 97 % | HEART RATE: 77 BPM | TEMPERATURE: 98.3 F | DIASTOLIC BLOOD PRESSURE: 64 MMHG | SYSTOLIC BLOOD PRESSURE: 118 MMHG

## 2024-02-09 ASSESSMENT — ENCOUNTER SYMPTOMS: PAIN LOCATION - PAIN QUALITY: ACHE

## 2024-02-09 NOTE — TELEPHONE ENCOUNTER
Printed paperwork and located it via email it was sent will , Nicki will notify of completion because she has areas she has to fill out.

## 2024-02-12 ENCOUNTER — HOME CARE VISIT (OUTPATIENT)
Age: 54
End: 2024-02-12
Payer: COMMERCIAL

## 2024-02-12 VITALS
DIASTOLIC BLOOD PRESSURE: 74 MMHG | RESPIRATION RATE: 17 BRPM | OXYGEN SATURATION: 99 % | HEART RATE: 80 BPM | SYSTOLIC BLOOD PRESSURE: 132 MMHG | TEMPERATURE: 98.3 F

## 2024-02-12 PROCEDURE — G0157 HHC PT ASSISTANT EA 15: HCPCS

## 2024-02-12 NOTE — HOME HEALTH
Subjective: I overdid it on Saturday, but my leg felt better after resting on Sunday.    Caregiver involvement: Pt's  assists with household tasks, meal prep and care as needed.    Medications reviewed and all medications are available in the home this visit.    Medications are  at this time.  no new medication.    Patient education provided this visit: fall prevention, energy conservation, TTWB, sequencing with stairs, pain and edema management    Patient level of understanding of education provided: Pt requires further education.    Patient response to procedure performed:  Pain in L LE increased from 2/10 to 3/10.    Patient's Progress towards personal goals: Pt reports no falls and no ER visits.  She is progressing with amb and gradually increasing the distance she is able to amb before needing a seated rest break.  She also is improving with maintaining TTWB on L LE during gait.  Pt reports currently scooting up stairs on her bottom and requires assistance from her  to lift L LE up the stairs.  Pt attempted amb with crutch and handrail and was able to complete amb up and down single step from living room to kitchen and back down, while maintaining TTWB.  She was unable to maintain TTWB on flight of stairs.    Continued need for the following skills: HHPT medically necessary to address decreased LE strength, decreased standing balance and increased risk of falls.    Plan for next visit: stairs and LE strengthening    The following discharge planning was discussed with the pt/caregiver: d/c HHPT in 5 more visits.

## 2024-02-13 ENCOUNTER — CARE COORDINATION (OUTPATIENT)
Dept: OTHER | Facility: CLINIC | Age: 54
End: 2024-02-13

## 2024-02-13 RX ORDER — ASPIRIN 325 MG
325 TABLET ORAL DAILY
COMMUNITY

## 2024-02-13 NOTE — CARE COORDINATION
Ambulatory Care Coordination Note  2024    Patient Current Location:  Home: [address] [facility] [address]     ACM contacted the patient by telephone. Verified name and  with patient as identifiers. Provided introduction to self, and explanation of the ACM role.     Challenges to be reviewed by the provider   Additional needs identified to be addressed with provider: No  none               Method of communication with provider: none.    ACM: Mariana De Paz RN    Pt is doing well. Continues with PT 2x a week. She is using the FWW for ambulation. States her incision is fine no issues with anything. She usually takes tylenol for pain only takes the narcotic prior to PT sessions. She returns to the surgeon next Wednesday for follow up and will discuss then about returning to work she works from home. She is on ASA 325mg po daily now and has stopped the Xarelto.  Will gradually decrease to 81 mg a daily at some point.  ACM will follow no issues identified today       Care Coordination Interventions    Referral from Primary Care Provider: No  Suggested Interventions and Community Resources          Goals Addressed                   This Visit's Progress     Conditions and Symptoms   On track     I will schedule office visits, as directed by my provider.  I will keep my appointment or reschedule if I have to cancel.  I will notify my provider of any barriers to my plan of care.  I will notify my provider of any symptoms that indicate a worsening of my condition.    Barriers: overwhelmed by complexity of regimen  Plan for overcoming my barriers: Continue to openly engage with ACM and providers to promote success in reaching goals    Confidence: 10/10  Anticipated Goal Completion Date: 24 and ongoing                Future Appointments   Date Time Provider Department Center   2024 To Be Determined Brooke Blakely PTA [facility] HR HOME HEAL   2/15/2024 To Be Determined Deysi Youngblood RN

## 2024-02-14 ENCOUNTER — HOME CARE VISIT (OUTPATIENT)
Age: 54
End: 2024-02-14
Payer: COMMERCIAL

## 2024-02-14 VITALS
SYSTOLIC BLOOD PRESSURE: 138 MMHG | OXYGEN SATURATION: 99 % | DIASTOLIC BLOOD PRESSURE: 72 MMHG | TEMPERATURE: 98.4 F | HEART RATE: 80 BPM | RESPIRATION RATE: 16 BRPM

## 2024-02-14 PROCEDURE — G0157 HHC PT ASSISTANT EA 15: HCPCS

## 2024-02-15 ENCOUNTER — HOME CARE VISIT (OUTPATIENT)
Age: 54
End: 2024-02-15
Payer: COMMERCIAL

## 2024-02-15 NOTE — HOME HEALTH
Subjective: My pain is getting better, it really doesn't hurt when I'm resting.    Caregiver involvement: Pt's  assists with household tasks, meal prep and care as needed.    Medications reviewed and all medications are available in the home this visit.    Medications are effective at this time.  no new medication    Patient education provided this visit: fall prevention, safety on stairs, TTWB, body mechanics with exercises and stairs.    Patient level of understanding of education provided: Pt requires further education.    Patient response to procedure performed:  Pt fatigued after amb and amb on stairs.    Patient's Progress towards personal goals: Pt reports no falls and no ER visits.  She is gradually increasing the frequency and distance she is able to amb t/o the day.  She is now able to amb to and from the bathroom vs using commode.  Pt is improving with amb on stairs and today was able to scoot up and down stairs on her bottom with only CGA on L LE.  She is beginning to amb with crutches on stairs to increase independence with amb out of home.  Hinged knee brace set at 60 degrees today.    Continued need for the following skills: HHPT medically necessary to address decreased LE strength, decreased standing balance and increased risk of falls.    Plan for next visit: stairs and LE strengthening    The following discharge planning was discussed with the pt/caregiver: d/c HHPT in 4 more visits.

## 2024-02-16 ENCOUNTER — CLINICAL DOCUMENTATION (OUTPATIENT)
Age: 54
End: 2024-02-16

## 2024-02-16 NOTE — PROGRESS NOTES
Patient called stating [employer] never received the full [facility] documents faxed on 2/9. Re-faxed both missing pages of the document to [phone].

## 2024-02-19 ENCOUNTER — HOME CARE VISIT (OUTPATIENT)
Age: 54
End: 2024-02-19
Payer: COMMERCIAL

## 2024-02-19 VITALS
DIASTOLIC BLOOD PRESSURE: 78 MMHG | TEMPERATURE: 97.2 F | HEART RATE: 78 BPM | RESPIRATION RATE: 17 BRPM | SYSTOLIC BLOOD PRESSURE: 138 MMHG | OXYGEN SATURATION: 97 %

## 2024-02-19 VITALS
TEMPERATURE: 98.9 F | OXYGEN SATURATION: 99 % | RESPIRATION RATE: 16 BRPM | SYSTOLIC BLOOD PRESSURE: 140 MMHG | HEART RATE: 78 BPM | DIASTOLIC BLOOD PRESSURE: 72 MMHG

## 2024-02-19 PROCEDURE — G0157 HHC PT ASSISTANT EA 15: HCPCS

## 2024-02-19 PROCEDURE — G0299 HHS/HOSPICE OF RN EA 15 MIN: HCPCS

## 2024-02-19 ASSESSMENT — ENCOUNTER SYMPTOMS: PAIN LOCATION - PAIN QUALITY: THROBBING

## 2024-02-19 NOTE — HOME HEALTH
Subjective:  I am feeling really good, I don't have any pain today.    Caregiver involvement: Pt's  assists with household tasks, meal prep and care as needed.    Medications reviewed and all medications are available in the home this visit.    Medications are effective at this time.  no new medication.    Patient education provided this visit: Fall prevention, body mechanics with exercises and mobility    Patient level of understanding of education provided: Pt requires further education    Patient response to procedure performed:  Pt fatigued at end of session.    Patient's Progress towards personal goals: Pt reports no falls and no ER visits.  Pt has improved with transfers from SBA to now mod I.  She is able to maintain TTWB with transition.  Pt has improved with amb from 25 feet to now 120 feet in home over carpeted and non carpeted surfaces with front wheeled walker and mod I.  Pt is mod I with supine <-> sit when using R LE to assist L LE.  She is working to increase independence with raising L LE on and off couch w/o assistance.    Continued need for the following skills: HHPT medically necessary to address decreased LE strength, decreased standing balance and increased risk of falls.    Plan for next visit: stairs    The following discharge planning was discussed with the pt/caregiver: d/c HHPT in 3 more visits.

## 2024-02-20 NOTE — HOME HEALTH
treatment of a number of types of infections caused by bacteria and protozoa. Doxycycline can be used either by mouth or intravenously. Common side effect may include diarrhea, nausea, vomiting, a red rash, and an increased risk of a sunburn    .    MD notified of any discrepancies/look a-like medications/medication interactions: none  Medications are effective at this time.      Home health supplies by type and quantity ordered/delivered this visit include: n/a    Patient education provided this visit: Instructed in materials used in wound care. However, even with proper treatment, a wound infection may occur. Check the wound daily for signs of infection like increased drainage or bleeding from the wound that won’t stop with direct pressure, redness in or around the wound, foul odor or pus coming from the wound, increased swelling around the wound and ever above 100.4°F or shaking chills.        Sharps education provided: n/a    Patient level of understanding of education provided: Patient and caregiver verbalized understanding      Patient response to procedure performed:  Patient tolerated w/o any increased level of pain      Agency Progress toward goals: goals met    Patient's Progress towards personal goals: goals met    Home exercise program: Take all medications as prescribed, follow all fall precaution measures, attend all future medical appointments      Continued need for the following skills: Nursing.

## 2024-02-21 ENCOUNTER — OFFICE VISIT (OUTPATIENT)
Age: 54
End: 2024-02-21
Payer: COMMERCIAL

## 2024-02-21 DIAGNOSIS — S82.001A UNSPECIFIED FRACTURE OF RIGHT PATELLA, INITIAL ENCOUNTER FOR CLOSED FRACTURE: Primary | ICD-10-CM

## 2024-02-21 PROCEDURE — 99024 POSTOP FOLLOW-UP VISIT: CPT | Performed by: PHYSICIAN ASSISTANT

## 2024-02-21 PROCEDURE — 73560 X-RAY EXAM OF KNEE 1 OR 2: CPT | Performed by: PHYSICIAN ASSISTANT

## 2024-02-21 NOTE — PROGRESS NOTES
Celine Ramirez  1970     HISTORY OF PRESENT ILLNESS  Celine Ramirez is a 53 y.o. female who presents today for evaluation s/p ORIF left patella on 24. Patient pain is a 1/10.  Pain is very controlled.   Has been taking her Xarelto as directed.  Patient denies any fever, chills, chest pain, shortness of breath or calf pain. The remainder of the review of systems is negative. There are no new illness or injuries to report since last seen in the office. No changes in medications, allergies, social or family history.      PHYSICAL EXAM:   There were no vitals taken for this visit.   The patient is a well-developed, well-nourished female in no acute distress.  The patient is alert and oriented times three. The patient appears to be well groomed. Mood and affect are normal.  ORTHOPEDIC EXAM of left knee:  Inspection:  effusion present, bruising present, edema not present   Incisions healing  Range of motion: 0-95  ttp incisional and lower half of pattela  Stability: Stable  Strength: n/a/5  Lag with straight leg raise    Imagin view xray images of left knee taken in the office on 2024 read and reviewed by myself reveal reduced patella fracture with hardware in place . Slight suggestion of potential loosening of one of the two screws. Will monitor.    IMPRESSION:      ICD-10-CM    1. Unspecified fracture of right patella, initial encounter for closed fracture  S82.001A [[id number]] Knee 1-2V           PLAN:   Patient doing well postoperatively  Brace opened for full rom. Cont with home PT. PWB only    RTC 2 week for wound check and repeat xray    Michelle Larsen PA-C  [facility]

## 2024-02-22 ENCOUNTER — CARE COORDINATION (OUTPATIENT)
Dept: OTHER | Facility: CLINIC | Age: 54
End: 2024-02-22

## 2024-02-22 NOTE — CARE COORDINATION
Ambulatory Care Coordination Note    ACM attempted to reach patient for care management follow up call regarding outcome from ortho follow up and progress with home PT . HIPAA compliant message left requesting a return phone call at patient convenience.     Plan for follow-up call in 7-10 days    Future Appointments   Date Time Provider Department Center   2/23/2024  9:30 AM Brooke Blakely, [place] [facility] HR HOME HEAL   2/26/2024 10:00 AM Brooke Blakely PTA [facility] HR HOME HEAL   3/1/2024 To Be Determined Norma Hutton, PT [facility] HR HOME HEAL   3/6/2024  2:45 PM Michelle Larsen PA [facility] BS JAKOB JENKINS, RN- [facility]  Associate Care Manager  [phone]  [email or web address]

## 2024-02-23 ENCOUNTER — HOME CARE VISIT (OUTPATIENT)
Age: 54
End: 2024-02-23
Payer: COMMERCIAL

## 2024-02-24 NOTE — CASE COMMUNICATION
Pt requesting to hold PT today due to increased muscle soreness this week.  She is requesting to resume PT on Monday.

## 2024-02-26 ENCOUNTER — HOME CARE VISIT (OUTPATIENT)
Age: 54
End: 2024-02-26
Payer: COMMERCIAL

## 2024-02-26 VITALS
DIASTOLIC BLOOD PRESSURE: 78 MMHG | TEMPERATURE: 98.3 F | HEART RATE: 77 BPM | SYSTOLIC BLOOD PRESSURE: 140 MMHG | OXYGEN SATURATION: 100 % | RESPIRATION RATE: 16 BRPM

## 2024-02-26 PROCEDURE — G0157 HHC PT ASSISTANT EA 15: HCPCS

## 2024-02-26 ASSESSMENT — ENCOUNTER SYMPTOMS: PAIN LOCATION - PAIN QUALITY: ACHE

## 2024-02-27 NOTE — CASE COMMUNICATION
Assessment and Summary of Care:  Patient's current functional status before discharge is as follows    ROM: Pt had ortho f/u last week and they unlocked her L knee brace giving her full ROM of L LE.    Bed Mobility: She has progressed with bed mobility from CGA with supine <-> sit to now mod I.  She uses R LE to assist L LE onto couch/ bed, but is able to lower L LE w/o assistance.     Transfers:      Pt has progressed with transfers fr om SBA sit <-> stand from chair, toilet, couch and requires increased time for transition.  She now is mod I with all transfers.  Pt uses B UE support and is able to maintain TTWB on L LE in transition.  Car transfer mod I.    Gait: Pt has progressed with amb from 35 feet with SBA amb with front wheeled walker to now amb 125 feet outside with front wheeled walker and supervision over uneven surfaces.  She is mod I amb over carpeted and  non carpeted surfaces in home with front wheeled walker    Stairs: Pt has improved amb on stairs from scooting up and down on her bottom with mod A for L LE to now amb down and up 1 flight of stairs with SBA, handrail and 1 axillary crutch.  Instructed pt to use 1 axillary crutch and handrail to ensure TTWB on L LE.  Pt is able to scoot up and down stairs with L knee brace locked in extension mod I.    Recommendations: d/c HHPT next vis it.    .

## 2024-02-27 NOTE — HOME HEALTH
Subjective: I have been sleeping in my bed since Friday night.    Caregiver involvement: Pt's  assists with household tasks, meal prep and care as needed.    Medications reviewed and all medications are available in the home this visit.    Medications are effective at this time.  no new medication.    Patient education provided this visit: fall prevention, safety on stairs, TTWB on L LE    Patient level of understanding of education provided: Pt demonstrates good understanding of fall prevention and TTWB.    Patient response to procedure performed: Pain in L knee increased from 2/10 to 3/10 at end of session.     Patient's Progress towards personal goals: Pt reports no falls and no ER visits. Pt had ortho f/u last week and they unlocked her L knee brace giving her full ROM of L LE.  She has progressed with bed mobility from CGA with supine <-> sit to now mod I.  She uses R LE to assist L LE onto couch/ bed, but is able to lower L LE w/o assistance.  Pt has progressed with transfers from SBA sit <-> stand from chair, toilet, couch and requires increased time for transition.  She now is mod I with all transfers.  Pt uses B UE support and is able to maintain TTWB on L LE in transition.  Car transfer mod I.  Pt has progressed with amb from 35 feet with SBA amb with front wheeled walker to now amb 125 feet outside with front wheeled walker and supervision over uneven surfaces.  She is mod I amb over carpeted and non carpeted surfaces in home with front wheeled walker.  Pt has improved amb on stairs from scooting up and down on her bottom with mod A for L LE to now amb down and up 1 flight of stairs with SBA, handrail and 1 axillary crutch.  Instructed pt to use 1 axillary crutch and handrail to ensure TTWB on L LE.  Pt is able to scoot up and down stairs with L knee brace locked in extension mod I.    Continued need for the following skills: HHPT medically necessary to address decreased LE strength, decreased

## 2024-02-28 ENCOUNTER — CARE COORDINATION (OUTPATIENT)
Dept: OTHER | Facility: CLINIC | Age: 54
End: 2024-02-28

## 2024-02-28 NOTE — CARE COORDINATION
Ambulatory Care Coordination Note  2024    Patient Current Location:  Home: [address] [facility] [address]     ACM contacted the patient by telephone. Verified name and  with patient as identifiers. Provided introduction to self, and explanation of the ACM role.     Pt continues with home PT. She has a follow up next Wednesday for appt and repeat x ray. Last x ray showed slight suggestion of potential loosening of one of 2 screws. Pt said she feels her knee looks great, no increased swelling or anything unusual no pain issue. Incision looks good she said no signs of any issues there. Pt has all DME at home. She is listed for partial wt bearing in the note. Her brace was opened up full in the office on last appt. She works remotely and will RTW on Monday  will follow     ACM: Mariana De Paz, RN            Care Coordination Interventions    Referral from Primary Care Provider: No  Suggested Interventions and Community Resources          Goals Addressed                   This Visit's Progress     Conditions and Symptoms   On track     I will schedule office visits, as directed by my provider.  I will keep my appointment or reschedule if I have to cancel.  I will notify my provider of any barriers to my plan of care.  I will notify my provider of any symptoms that indicate a worsening of my condition.    Barriers: overwhelmed by complexity of regimen  Plan for overcoming my barriers: Continue to openly engage with ACM and providers to promote success in reaching goals    Confidence: 10/10  Anticipated Goal Completion Date: 24 and ongoing                Future Appointments   Date Time Provider Department Center   3/1/2024 To Be Determined Norma Hutton, PT [facility] [place] HOME HEAL   3/6/2024  2:45 PM Michelle Larsen PA [facility] BS [facility]

## 2024-02-29 ENCOUNTER — OFFICE VISIT (OUTPATIENT)
Age: 54
End: 2024-02-29
Payer: COMMERCIAL

## 2024-02-29 ENCOUNTER — HOME CARE VISIT (OUTPATIENT)
Age: 54
End: 2024-02-29
Payer: COMMERCIAL

## 2024-02-29 ENCOUNTER — CARE COORDINATION (OUTPATIENT)
Dept: OTHER | Facility: CLINIC | Age: 54
End: 2024-02-29

## 2024-02-29 VITALS
HEART RATE: 78 BPM | RESPIRATION RATE: 17 BRPM | OXYGEN SATURATION: 98 % | SYSTOLIC BLOOD PRESSURE: 135 MMHG | TEMPERATURE: 98 F | DIASTOLIC BLOOD PRESSURE: 70 MMHG

## 2024-02-29 VITALS — BODY MASS INDEX: 25.79 KG/M2 | HEIGHT: 65 IN

## 2024-02-29 DIAGNOSIS — S82.001A UNSPECIFIED FRACTURE OF RIGHT PATELLA, INITIAL ENCOUNTER FOR CLOSED FRACTURE: Primary | ICD-10-CM

## 2024-02-29 DIAGNOSIS — S82.032A CLOSED DISPLACED TRANSVERSE FRACTURE OF LEFT PATELLA, INITIAL ENCOUNTER: ICD-10-CM

## 2024-02-29 PROCEDURE — 73560 X-RAY EXAM OF KNEE 1 OR 2: CPT | Performed by: PHYSICIAN ASSISTANT

## 2024-02-29 PROCEDURE — G0151 HHCP-SERV OF PT,EA 15 MIN: HCPCS

## 2024-02-29 PROCEDURE — 99024 POSTOP FOLLOW-UP VISIT: CPT | Performed by: PHYSICIAN ASSISTANT

## 2024-02-29 ASSESSMENT — ENCOUNTER SYMPTOMS: PAIN LOCATION - PAIN QUALITY: ACHING

## 2024-02-29 NOTE — PROGRESS NOTES
Celine Ramirez  1970     HISTORY OF PRESENT ILLNESS  Celine Ramirez is a 53 y.o. female who presents today for evaluation s/p ORIF left patella on 24. Patient pain is a 1/10.  Overall doing well. Been PWB.   Patient denies any fever, chills, chest pain, shortness of breath or calf pain. The remainder of the review of systems is negative. There are no new illness or injuries to report since last seen in the office. No changes in medications, allergies, social or family history.      PHYSICAL EXAM:   There were no vitals taken for this visit.   The patient is a well-developed, well-nourished female in no acute distress.  The patient is alert and oriented times three. The patient appears to be well groomed. Mood and affect are normal.  ORTHOPEDIC EXAM of left knee:  Inspection:  effusion present, bruising present, edema not present   Incisions healing  Range of motion: 0-100  No ttp  Stability: Stable  Strength: n/a/5  Lag with straight leg raise    Imagin view xray images of left knee taken in the office on 24 read and reviewed by myself reveal reduced patella fracture with hardware in place . No evidence of loosening.     IMPRESSION:      ICD-10-CM    1. Unspecified fracture of right patella, initial encounter for closed fracture  S82.001A [59963] Knee 1-2V           PLAN:   Patient doing well postoperatively  She will continue with physical therapy.  Will refer her to outpatient physical therapy at this point.  She can start weightbearing as tolerated in 1 week.  Return to care 4-week    Michelle Larsen PA-C  [facility]

## 2024-02-29 NOTE — CARE COORDINATION
Reviewed note from visit with Ortho today and repeat x ray. My chart message sent to patient for follow up on results reviewed.     Mariana JENKINS, RN- [facility]  Associate Care Manager  [phone]  [email or web address]

## 2024-03-01 NOTE — HOME HEALTH
Pt. clinically discharged and documentation finalized for completion of PT discharge   Caregiver involvement: family is primary caregiver at this time and has been assisting with medical appointments and some ADL support   Medications reconciled and all medications are available in the home this visit.  Meds reconciled/ reviewed Medications are effective at this time.     Patient education provided this visit: since all goals have been met and education has been completed, patient is medically stable and patient/caregiver are able to independently manage medications       Bed Mobility: She has progressed with bed mobility from CGA with supine <-> sit to now mod I. She uses R LE to assist L LE onto couch/ bed, but is able to lower L LE w/o assistance.   Transfers: Pt has progressed with transfers from SBA sit <-> stand from chair, toilet, couch and requires increased time for transition. She now is mod I with all transfers. Pt uses B UE support and is able to maintain TTWB on L LE in transition. Car transfer mod I.   Gait: Pt has progressed with amb from 35 feet with SBA amb with front wheeled walker to now amb 125 feet outside with front wheeled walker and supervision over uneven surfaces. She is mod I amb over carpeted and non carpeted surfaces in home with front wheeled walker     Stairs: Pt has improved amb on stairs from scooting up and down on her bottom with mod A for L LE to now amb down and up 1 flight of stairs with SBA, handrail and 1 axillary crutch. Instructed pt to use 1 axillary crutch and handrail to ensure TTWB on L LE. Pt is able to scoot up and down stairs with L knee brace locked in extension mod I.    Progress toward goals: Patient has met all goals, see interventions for details. Pt. was able to return demonstrate all mobility training and HEP shown independently.     Patient response to treatment and education: Patient had a follow up with Surgeon and was advanced to continue with outpatient

## 2024-03-06 ENCOUNTER — CARE COORDINATION (OUTPATIENT)
Dept: OTHER | Facility: CLINIC | Age: 54
End: 2024-03-06

## 2024-03-06 NOTE — CARE COORDINATION
Ambulatory Care Coordination Note    ACM attempted to reach patient for care management follow up call regarding follow up from Ortho visit and starting of outpatient therapy . HIPAA compliant message left requesting a return phone call at patient convenience.     Plan for follow-up call in 7-10 days    Future Appointments   Date Time Provider Department Center   3/27/2024  3:45 PM Michelle Larsen PA [facility] Jackie Miles BSN, RN- [facility]  Associate Care Manager  [phone]  [email or web address]

## 2024-03-11 RX ORDER — LEVOTHYROXINE SODIUM 0.12 MG/1
TABLET ORAL
Qty: 90 TABLET | Refills: 1 | Status: SHIPPED | OUTPATIENT
Start: 2024-03-11

## 2024-03-12 ENCOUNTER — CARE COORDINATION (OUTPATIENT)
Dept: OTHER | Facility: CLINIC | Age: 54
End: 2024-03-12

## 2024-03-12 NOTE — CARE COORDINATION
Ambulatory Care Coordination Note  3/12/2024    Patient Current Location:  Home: [address] [facility] [address]     ACM contacted the patient by telephone. Verified name and  with patient as identifiers. Provided introduction to self, and explanation of the ACM role.       ACM: Mariana De Paz RN      Pt is doing well works from home, and is tolerating it well, no pain issues at all, with any discomfort she may take an advil. She continues to wear her brace and will start OP therapy this Friday. Her  will be taking her to her appts. She said she has no needs or questions and feels she is on the road to recovery  ACM will sign off at this time and graduate patient from program         Care Coordination Interventions    Referral from Primary Care Provider: No  Suggested Interventions and Community Resources          Goals Addressed                   This Visit's Progress     COMPLETED: Conditions and Symptoms        I will schedule office visits, as directed by my provider.  I will keep my appointment or reschedule if I have to cancel.  I will notify my provider of any barriers to my plan of care.  I will notify my provider of any symptoms that indicate a worsening of my condition.    Barriers: overwhelmed by complexity of regimen  Plan for overcoming my barriers: Continue to openly engage with ACM and providers to promote success in reaching goals    Confidence: 10/10  Anticipated Goal Completion Date: 24 and ongoing  3/12/24 - Goal met                 Future Appointments   Date Time Provider Department Center   3/15/2024  2:50 PM Janina Ibarra, PT [facility]   3/27/2024  3:45 PM Michelle Larsen PA [facility] AMB

## 2024-03-15 ENCOUNTER — HOSPITAL ENCOUNTER (OUTPATIENT)
Facility: HOSPITAL | Age: 54
Setting detail: RECURRING SERIES
Discharge: HOME | End: 2024-03-18
Payer: COMMERCIAL

## 2024-03-15 PROCEDURE — 97161 PT EVAL LOW COMPLEX 20 MIN: CPT

## 2024-03-15 PROCEDURE — 97535 SELF CARE MNGMENT TRAINING: CPT

## 2024-03-15 PROCEDURE — 97110 THERAPEUTIC EXERCISES: CPT

## 2024-03-15 NOTE — PROGRESS NOTES
PT DAILY TREATMENT NOTE/KNEE EVAL       Patient Name: Celine Ramirez    Date: 3/15/2024    : 1970  Insurance: Payor: [facility] / Plan: [employer] [facility] [facility] EM[employer]ES / Product Type: *No Product type* /      Patient  verified yes     Visit #   Current / Total 1 12   Time   In / Out 255 339   Pain   In / Out 2 2   Subjective Functional Status/Changes:       Treatment Area: Pain in left knee [M25.562]  Pain in right knee [M25.561]    SUBJECTIVE  Pain Level (0-10 scale): 2  [x]constant []intermittent [x]improving []worsening []no change since onset  Worse being on it too much or doing the exercises too much    Better rest, ice, Ibuprofin  Subjective functional status/changes:     PLOF: I all areas of ADLS and activities, working, household and community activities,   Limitations to PLOF: P/O recovery  Mechanism of Injury: fall 24, P/O 24  Current symptoms/Complaints: 52 YO female diagnosed as above and with S/S consistent with above diagnosis presents to skilled outpatient PT [facility] pain and swelling P/O left knee 24 for left patella fx 24.   Previous Treatment/Compliance: ER, MD, surgery, bracing, home therapy, ice, ibuprofin  PMHx/Surgical Hx: right patella fx with ORIF 2022  Work Hx: remote [employer], back to work  Living Situation: lives in 2 [address] house, 4 [address] B rails, not alone   Pt Goals: get back to what I was before this fall  Barriers: [x]pain []financial []time []transportation []other  Motivation: good  Substance use: [x]Alcohol [x]Tobacco []other:   FABQ Score: []low []elevate  Cognition: A & O x 3    Other:    OBJECTIVE/EXAMINATION  Domestic Life: work, household, community activities  Activity/Recreational Limitations: P/O recovery   Mobility: RW, P/O brace, FWBAT   Self Care: I         26 min [x]Eval - untimed                      Therapeutic Procedures:  Tx Min Billable or 1:1 Min (if diff from Tx Min) Procedure, Rationale, Specifics   10 10 49631 Therapeutic Exercise (timed):  
comorbidities / personal factors will impact the outcome/ POC ; Examination:  HIGH Complexity : 4+ Standardized tests and measures addressing body structure, function, activity limitation and / or participation in recreation  ;Presentation:  LOW Complexity : Stable, uncomplicated  ;Clinical Decision Making:  MEDIUM Complexity : FOTO score of 26-74 FOTO score = an established functional score where 100 = no disability  Overall Complexity Rating: LOW   Problem List: pain affecting function, decrease ROM, decrease strength, edema affection function, impaired gait/balance, decrease ADL/functional abilities, decrease activity tolerance, decrease flexibility/joint mobility, decrease transfer abilities , and other FOTO    Treatment Plan may include any combination of the followin Therapeutic Exercise, 51678 Neuromuscular Re-Education, 10431 Manual Therapy, 73243 Therapeutic Activity, 30663 Self Care/Home Management, 94931 Vasopneumatic Device  (Vasopnuematic compression justification:  Per bilateral girth measures taken and listed above the edema is considered significant and having an impact on the patient's strength, gait, self care, and ADL's), and 50064 Gait Training    Patient requests no patellar mobs or scar massage  Patient / Family readiness to learn indicated by: asking questions, trying to perform skills, interest, return verbalization , and return demonstration   Persons(s) to be included in education: patient (P)  Barriers to Learning/Limitations: none  Measures taken if barriers to learning present: NA  Patient Goal (s): get back to what I was before this fall      Patient Self Reported Health Status: good  Rehabilitation Potential: good    Short Term Goals: To be accomplished in 2 WEEKS  1 patient will have established and be I with HEP to aid with independence and self management at discharge  EVAL HEP issued at evaluation  2 patient will have pain /10 to aid with increase tolerance to ADLS and

## 2024-03-19 ENCOUNTER — APPOINTMENT (OUTPATIENT)
Facility: HOSPITAL | Age: 54
End: 2024-03-19
Payer: COMMERCIAL

## 2024-03-20 ENCOUNTER — HOSPITAL ENCOUNTER (OUTPATIENT)
Facility: HOSPITAL | Age: 54
Setting detail: RECURRING SERIES
Discharge: HOME | End: 2024-03-23
Payer: COMMERCIAL

## 2024-03-20 PROCEDURE — 97110 THERAPEUTIC EXERCISES: CPT

## 2024-03-20 PROCEDURE — 97530 THERAPEUTIC ACTIVITIES: CPT

## 2024-03-20 NOTE — PROGRESS NOTES
PHYSICAL / OCCUPATIONAL THERAPY - DAILY TREATMENT NOTE    Patient Name: Celine Ramirez    Date: 3/20/2024    : 1970  Insurance: Payor: [facility] / Plan: [employer] EM[employer]YEES / Product Type: *No Product type* /      Patient  verified Yes     Visit #   Current / Total 2 12   Time   In / Out 530 pm  613 pm    Pain   In / Out 2/10 2/10    Subjective Functional Status/Changes: Patient reports that her knee is a little stiff but doing good.     TREATMENT AREA =  Pain in left knee [M25.562]    OBJECTIVE         Therapeutic Procedures:    Tx Min Billable or 1:1 Min (if diff from Tx Min) Procedure, Rationale, Specifics   25  10201 Therapeutic Exercise (timed):  increase ROM, strength, coordination, balance, and proprioception to improve patient's ability to progress to PLOF and address remaining functional goals. (see flow sheet as applicable)     Details if applicable:       18  63336 Therapeutic Activity (timed):  use of dynamic activities replicating functional movements to increase ROM, strength, coordination, balance, and proprioception in order to improve patient's ability to progress to PLOF and address remaining functional goals.  (see flow sheet as applicable)     Details if applicable:            Details if applicable:            Details if applicable:            Details if applicable:     43  [facility] Totals Reminder: bill using total billable min of TIMED therapeutic procedures (example: do not include dry needle or estim unattended, both untimed codes, in totals to left)  8-22 min = 1 unit; 23-37 min = 2 units; 38-52 min = 3 units; 53-67 min = 4 units; 68-82 min = 5 units   Total Total     [x]  Patient Education billed concurrently with other procedures   [x] Review HEP    [] Progressed/Changed HEP, detail:    [] Other detail:       Objective Information/Functional Measures/Assessment    Patient presented to today's session with a left knee TROM, RW and FWB. Initiated exercises set at initial evaluation to

## 2024-03-21 ENCOUNTER — APPOINTMENT (OUTPATIENT)
Facility: HOSPITAL | Age: 54
End: 2024-03-21
Payer: COMMERCIAL

## 2024-03-26 ENCOUNTER — APPOINTMENT (OUTPATIENT)
Facility: HOSPITAL | Age: 54
End: 2024-03-26
Payer: COMMERCIAL

## 2024-03-27 ENCOUNTER — OFFICE VISIT (OUTPATIENT)
Age: 54
End: 2024-03-27
Payer: COMMERCIAL

## 2024-03-27 VITALS — BODY MASS INDEX: 25.83 KG/M2 | HEIGHT: 65 IN | WEIGHT: 155 LBS

## 2024-03-27 DIAGNOSIS — S82.032D CLOSED DISPLACED TRANSVERSE FRACTURE OF LEFT PATELLA WITH ROUTINE HEALING, SUBSEQUENT ENCOUNTER: Primary | ICD-10-CM

## 2024-03-27 PROCEDURE — 73560 X-RAY EXAM OF KNEE 1 OR 2: CPT | Performed by: PHYSICIAN ASSISTANT

## 2024-03-27 PROCEDURE — 99024 POSTOP FOLLOW-UP VISIT: CPT | Performed by: PHYSICIAN ASSISTANT

## 2024-03-27 NOTE — PROGRESS NOTES
Celine Ramirez  1970     HISTORY OF PRESENT ILLNESS  Celine Ramirez is a 53 y.o. female who presents today for evaluation s/p ORIF left patella on 24. Patient pain is a 1/10.  Overall doing well. Been WBAT with brace.   Patient denies any fever, chills, chest pain, shortness of breath or calf pain. The remainder of the review of systems is negative. There are no new illness or injuries to report since last seen in the office. No changes in medications, allergies, social or family history.      PHYSICAL EXAM:   Ht 1.651 m (5' 5\")   Wt 70.3 kg (155 lb)   BMI 25.79 kg/m²    The patient is a well-developed, well-nourished female in no acute distress.  The patient is alert and oriented times three. The patient appears to be well groomed. Mood and affect are normal.  ORTHOPEDIC EXAM of left knee:  Inspection:  effusion not present, bruising not present, edema not present   Incisions healing  Range of motion: 0-120  No ttp  Stability: Stable  Strength: 4/5  No Lag with straight leg raise    Imagin view xray images of left knee taken in the office on 24 read and reviewed by myself reveal reduced patella fracture with hardware in place . No evidence of loosening.      2 view xray images of left knee taken in the office on 3/27/24 read and reviewed by myself reveal reduced patella fracture with hardware in place . No evidence of loosening.     IMPRESSION:      ICD-10-CM    1. Closed displaced transverse fracture of left patella with routine healing, subsequent encounter  S82.032D [facility] [place]     [87234] Knee 1-2V             PLAN:   Patient doing well postoperatively  She will continue with physical therapy.  WBAT and she may d/c use of the brace. Discussed to be mindful of pain as she slowly progresses her acitivites  RTC 6 weeks    Michelle Larsen PA-C  [facility]

## 2024-03-28 ENCOUNTER — APPOINTMENT (OUTPATIENT)
Facility: HOSPITAL | Age: 54
End: 2024-03-28
Payer: COMMERCIAL

## 2024-03-29 ENCOUNTER — HOSPITAL ENCOUNTER (OUTPATIENT)
Facility: HOSPITAL | Age: 54
Setting detail: RECURRING SERIES
Discharge: HOME | End: 2024-04-01
Payer: COMMERCIAL

## 2024-03-29 PROCEDURE — 97112 NEUROMUSCULAR REEDUCATION: CPT

## 2024-03-29 PROCEDURE — 97032 APPL MODALITY 1+ESTIM EA 15: CPT

## 2024-03-29 PROCEDURE — 97110 THERAPEUTIC EXERCISES: CPT

## 2024-03-29 PROCEDURE — 97530 THERAPEUTIC ACTIVITIES: CPT

## 2024-03-29 NOTE — PROGRESS NOTES
PHYSICAL / OCCUPATIONAL THERAPY - DAILY TREATMENT NOTE    Patient Name: Celine Ramirez    Date: 3/29/2024    : 1970  Insurance: Payor: [facility] / Plan: [employer] EMPLOYEES / Product Type: *No Product type* /      Patient  verified Yes     Visit #   Current / Total 3 12   Time   In / Out 409 pm  458 pm    Pain   In / Out 2/10 2/10    Subjective Functional Status/Changes: Patient reports that her knee is a little stiff but doing good.     TREATMENT AREA =  Pain in left knee [M25.562]    OBJECTIVE    Modalities Rationale:     increase muscle contraction/control to improve patient's ability to progress to PLOF and address remaining functional goals.     min [] Estim Unattended, type/location:                                      []  w/ice    []  w/heat   10 min [] Estim Attended, type/location:  NMES to left quad                                   []  w/US     []  w/ice    []  w/heat    []  TENS insruct      min []  Mechanical Traction: type/lbs                   []  pro   []  sup   []  int   []  cont    []  before manual    []  after manual    min []  Ultrasound, settings/location:      min  unbill []  Ice     []  Heat    location/position:     min []  Paraffin,  details:     min []  Vasopneumatic Device, press/temp:     min []  Whirlpool / Fluido:    If using vaso (only need to measure limb vaso being performed on)      pre-treatment girth :       post-treatment girth :       measured at (landmark location) :      min []  Other:    Skin assessment post-treatment:   Intact      Therapeutic Procedures:    Tx Min Billable or 1:1 Min (if diff from Tx Min) Procedure, Rationale, Specifics   16  45801 Therapeutic Exercise (timed):  increase ROM, strength, coordination, balance, and proprioception to improve patient's ability to progress to PLOF and address remaining functional goals. (see flow sheet as applicable)     Details if applicable:       83 04726 Therapeutic Activity (timed):  use of dynamic activities

## 2024-04-01 ENCOUNTER — HOSPITAL ENCOUNTER (OUTPATIENT)
Facility: HOSPITAL | Age: 54
Setting detail: RECURRING SERIES
Discharge: HOME | End: 2024-04-04
Payer: COMMERCIAL

## 2024-04-01 PROCEDURE — 97140 MANUAL THERAPY 1/> REGIONS: CPT

## 2024-04-01 PROCEDURE — 97530 THERAPEUTIC ACTIVITIES: CPT

## 2024-04-01 PROCEDURE — 97110 THERAPEUTIC EXERCISES: CPT

## 2024-04-01 NOTE — PROGRESS NOTES
= 1 unit; 23-37 min = 2 units; 38-52 min = 3 units; 53-67 min = 4 units; 68-82 min = 5 units   Total Total     [x]  Patient Education billed concurrently with other procedures   [x] Review HEP    [] Progressed/Changed HEP, detail:    [] Other detail:       Objective Information/Functional Measures/Assessment    Patient is progressing well with PT. Patient performed exercises, as per flow sheet, to further assist with increasing left quad strength for functional tasks and ambulating. Added hurdles and progressed reps of exercises. Patient improved left knee ROM with heel slides to 125 degrees with no reports of pain. TTP was present at left IT band and lateral quad. Patient responded well to today's overall session, as evident by, improved exercise tolerance and decreased reports of pain at the end of today's session. Patient is ready to progress to LBE, next session.     Patient will continue to benefit from skilled PT / OT services to modify and progress therapeutic interventions, analyze and address functional mobility deficits, analyze and address ROM deficits, analyze and address strength deficits, analyze and address soft tissue restrictions, analyze and cue for proper movement patterns, analyze and modify for postural abnormalities, and instruct in home and community integration to address functional deficits and attain remaining goals.    Progress toward goals / Updated goals:  []  See Progress Note/Recertification    Short Term Goals: To be accomplished in 2 WEEKS  1 patient will have established and be I with HEP to aid with independence and self management at discharge  EVAL HEP issued at evaluation  Current: Patient reports compliance with HEP. 4/1/24    2 patient will have pain 1/10 to aid with increase tolerance to ADLS and regular daily activities at home and in the community  EVAL 2  Current: Patient reports 2-3/10 pain today. 4/1/24     Long Term Goals: To be accomplished in 4 WEEKS  1 patient will

## 2024-04-02 ENCOUNTER — APPOINTMENT (OUTPATIENT)
Facility: HOSPITAL | Age: 54
End: 2024-04-02
Payer: COMMERCIAL

## 2024-04-03 ENCOUNTER — HOSPITAL ENCOUNTER (OUTPATIENT)
Facility: HOSPITAL | Age: 54
Setting detail: RECURRING SERIES
End: 2024-04-03
Payer: COMMERCIAL

## 2024-04-04 ENCOUNTER — APPOINTMENT (OUTPATIENT)
Facility: HOSPITAL | Age: 54
End: 2024-04-04
Payer: COMMERCIAL

## 2024-04-10 ENCOUNTER — HOSPITAL ENCOUNTER (OUTPATIENT)
Facility: HOSPITAL | Age: 54
Setting detail: RECURRING SERIES
Discharge: HOME | End: 2024-04-13
Payer: COMMERCIAL

## 2024-04-10 PROCEDURE — 97530 THERAPEUTIC ACTIVITIES: CPT

## 2024-04-10 PROCEDURE — 97110 THERAPEUTIC EXERCISES: CPT

## 2024-04-10 PROCEDURE — 97140 MANUAL THERAPY 1/> REGIONS: CPT

## 2024-04-10 NOTE — PROGRESS NOTES
PHYSICAL / OCCUPATIONAL THERAPY - DAILY TREATMENT NOTE    Patient Name: Celine Ramirez    Date: 4/10/2024    : 1970  Insurance: Payor: [facility] / Plan: [facility] EMPLOYEES / Product Type: *No Product type* /      Patient  verified Yes     Visit #   Current / Total 5 12   Time   In / Out 337 pm  415 pm    Pain   In / Out 010 0/10    Subjective Functional Status/Changes: Patient reports having increased left calf pain the day after last session. Patient explains that she feels that it was from all of the standing and walking she did on Easter.      TREATMENT AREA =  Pain in left knee [M25.562]    OBJECTIVE    Therapeutic Procedures:    Tx Min Billable or 1:1 Min (if diff from Tx Min) Procedure, Rationale, Specifics   15  66298 Therapeutic Exercise (timed):  increase ROM, strength, coordination, balance, and proprioception to improve patient's ability to progress to PLOF and address remaining functional goals. (see flow sheet as applicable)     Details if applicable:       15  92732 Therapeutic Activity (timed):  use of dynamic activities replicating functional movements to increase ROM, strength, coordination, balance, and proprioception in order to improve patient's ability to progress to PLOF and address remaining functional goals.  (see flow sheet as applicable)     Details if applicable:     10   57264 Manual Therapy (timed):  decrease pain, increase ROM, increase tissue extensibility, decrease trigger points, and increase postural awareness to improve patient's ability to progress to PLOF and address remaining functional goals.  The manual therapy interventions were performed at a separate and distinct time from the therapeutic activities interventions . (see flow sheet as applicable)      Details if applicable:  STM to left knee musculature          Details if applicable:            Details if applicable:     38  [facility] Totals Reminder: bill using total billable min of TIMED therapeutic procedures

## 2024-04-12 ENCOUNTER — HOSPITAL ENCOUNTER (OUTPATIENT)
Facility: HOSPITAL | Age: 54
Setting detail: RECURRING SERIES
Discharge: HOME | End: 2024-04-15
Payer: COMMERCIAL

## 2024-04-12 PROCEDURE — 97140 MANUAL THERAPY 1/> REGIONS: CPT

## 2024-04-12 PROCEDURE — 97110 THERAPEUTIC EXERCISES: CPT

## 2024-04-12 PROCEDURE — 97530 THERAPEUTIC ACTIVITIES: CPT

## 2024-04-12 NOTE — PROGRESS NOTES
PHYSICAL / OCCUPATIONAL THERAPY - DAILY TREATMENT NOTE    Patient Name: Celine Ramirez    Date: 2024    : 1970  Insurance: Payor: [facility] / Plan: [facility] EMPLOYEES / Product Type: *No Product type* /      Patient  verified Yes     Visit #   Current / Total 6 12   Time   In / Out 333 pm  415 pm    Pain   In / Out 0/10 010    Subjective Functional Status/Changes: Patient reports increased left thigh and calf soreness today. Patient explains that she has not been doing the exercises at home and not icing her knee.      TREATMENT AREA =  Pain in left knee [M25.562]    OBJECTIVE    Therapeutic Procedures:    Tx Min Billable or 1:1 Min (if diff from Tx Min) Procedure, Rationale, Specifics   23  39851 Therapeutic Exercise (timed):  increase ROM, strength, coordination, balance, and proprioception to improve patient's ability to progress to PLOF and address remaining functional goals. (see flow sheet as applicable)     Details if applicable:       11  24327 Therapeutic Activity (timed):  use of dynamic activities replicating functional movements to increase ROM, strength, coordination, balance, and proprioception in order to improve patient's ability to progress to PLOF and address remaining functional goals.  (see flow sheet as applicable)     Details if applicable:     8   87674 Manual Therapy (timed):  decrease pain, increase ROM, increase tissue extensibility, decrease trigger points, and increase postural awareness to improve patient's ability to progress to PLOF and address remaining functional goals.  The manual therapy interventions were performed at a separate and distinct time from the therapeutic activities interventions . (see flow sheet as applicable)      Details if applicable:  STM to left knee musculature          Details if applicable:            Details if applicable:     42  [facility] Totals Reminder: bill using total billable min of TIMED therapeutic procedures (example: do not include

## 2024-04-15 ENCOUNTER — HOSPITAL ENCOUNTER (OUTPATIENT)
Facility: HOSPITAL | Age: 54
Setting detail: RECURRING SERIES
Discharge: HOME | End: 2024-04-18
Payer: COMMERCIAL

## 2024-04-15 PROCEDURE — 97110 THERAPEUTIC EXERCISES: CPT

## 2024-04-15 PROCEDURE — 97530 THERAPEUTIC ACTIVITIES: CPT

## 2024-04-15 PROCEDURE — 97140 MANUAL THERAPY 1/> REGIONS: CPT

## 2024-04-15 NOTE — PROGRESS NOTES
[facility]  [address], [address], [place] [address]  Ph:[phone] Fx:[phone]  PHYSICAL THERAPY PROGRESS NOTE  Patient Name: Celine Ramirez : 1970   Treatment/Medical Diagnosis: Pain in left knee [M25.562]   Referral Source: Michelle Larsen PA     Date of Initial Visit: 3/15/24 Attended Visits: 6 Missed Visits: 2     SUMMARY OF TREATMENT  PT intervention has consisted of therapeutic exercise, functional activities, manual therapy, neuromuscular re-education and HEP to improve left knee strength and ROM to improve patient's ability to perform functional activities with ease.      CURRENT STATUS    Patient has attended 7 total PT sessions and is making steady progress towards goals due to patient feeling like she had a set back starting approximately two weeks ago. Patient reported experiencing increased left calf and thigh tightness and soreness which was attributed to not applying ice and weaning away from the TROM too soon. Patient presents to today's session with the TROM and right SPC. Patient notes improvements with left knee ROM, ambulation, getting in and out of shower and donning shoes. Patient continues to have decreased left quad strength in order to perform household duties, gardening, walking without brace or AD, grocery shopping and stair negotiation. Patient reports 50% improvement with PT and will continue to benefit from skilled PT / OT services to modify and progress therapeutic interventions, analyze and address functional mobility deficits, analyze and address ROM deficits, analyze and address strength deficits, analyze and address soft tissue restrictions, analyze and cue for proper movement patterns, analyze and modify for postural abnormalities, and instruct in home and community integration to address functional deficits and attain remaining goals.       Functional Gains: Left knee ROM, ambulation, getting in and 
estim unattended, both untimed codes, in totals to left)  8-22 min = 1 unit; 23-37 min = 2 units; 38-52 min = 3 units; 53-67 min = 4 units; 68-82 min = 5 units   Total Total     [x]  Patient Education billed concurrently with other procedures   [x] Review HEP    [] Progressed/Changed HEP, detail:    [] Other detail:       Objective Information/Functional Measures/Assessment    Functional Gains: Left knee ROM, ambulation, getting in and out of shower, donning shoes  Functional Deficits: Left knee strength, household duties, gardening, walking without brace or AD, grocery shopping, stair negotiation   % improvement: 50% improvement   Pain   Average: 0-1/10       Best: 0/10     Worst: 2/10  Patient Goal: \"I want to get back to where I was. Completely independent. I want to do things and not think that I'm going to hurt my leg. \"     Patient has attended 7 total PT sessions and is making steady progress towards goals due to patient feeling like she had a set back starting approximately two weeks ago. Patient reported experiencing increased left calf and thigh tightness and soreness which was attributed to not applying ice and weaning away from the TROM too soon. Patient presents to today's session with the TROM and right SPC. Patient notes improvements with left knee ROM, ambulation, getting in and out of shower and donning shoes. Patient continues to have decreased left quad strength in order to perform household duties, gardening, walking without brace or AD, grocery shopping and stair negotiation. Patient reports 50% improvement with PT and will continue to benefit from skilled PT / OT services to modify and progress therapeutic interventions, analyze and address functional mobility deficits, analyze and address ROM deficits, analyze and address strength deficits, analyze and address soft tissue restrictions, analyze and cue for proper movement patterns, analyze and modify for postural abnormalities, and instruct in

## 2024-04-17 ENCOUNTER — HOSPITAL ENCOUNTER (OUTPATIENT)
Facility: HOSPITAL | Age: 54
Setting detail: RECURRING SERIES
Discharge: HOME | End: 2024-04-20
Payer: COMMERCIAL

## 2024-04-17 PROCEDURE — 97110 THERAPEUTIC EXERCISES: CPT

## 2024-04-17 PROCEDURE — 97112 NEUROMUSCULAR REEDUCATION: CPT

## 2024-04-17 PROCEDURE — 97530 THERAPEUTIC ACTIVITIES: CPT

## 2024-04-17 NOTE — PROGRESS NOTES
PHYSICAL / OCCUPATIONAL THERAPY - DAILY TREATMENT NOTE    Patient Name: Celine Ramirez    Date: 2024    : 1970  Insurance: Payor: [facility] / Plan: [employer] [facility] EMPLOYEES / Product Type: *No Product type* /      Patient  verified Yes     Visit #   Current / Total 1 10   Time   In / Out 331 pm  410 pm   Pain   In / Out 0/10  010   Subjective Functional Status/Changes: Patient reports that her knee is feeling pretty good today.      TREATMENT AREA =  Pain in left knee [M25.562]    OBJECTIVE    Therapeutic Procedures:    Tx Min Billable or 1:1 Min (if diff from Tx Min) Procedure, Rationale, Specifics   15  97254 Therapeutic Exercise (timed):  increase ROM, strength, coordination, balance, and proprioception to improve patient's ability to progress to PLOF and address remaining functional goals. (see flow sheet as applicable)     Details if applicable:       11  58113 Therapeutic Activity (timed):  use of dynamic activities replicating functional movements to increase ROM, strength, coordination, balance, and proprioception in order to improve patient's ability to progress to PLOF and address remaining functional goals.  (see flow sheet as applicable)     Details if applicable:     5   99682 Manual Therapy (timed):  decrease pain, increase ROM, increase tissue extensibility, decrease trigger points, and increase postural awareness to improve patient's ability to progress to PLOF and address remaining functional goals.  The manual therapy interventions were performed at a separate and distinct time from the therapeutic activities interventions . (see flow sheet as applicable)      Details if applicable:  STM to left knee musculature   8  65656 Neuromuscular Re-Education (timed):  improve balance, coordination, kinesthetic sense, posture, core stability and proprioception to improve patient's ability to develop conscious control of individual muscles and awareness of position of extremities in order to

## 2024-04-22 RX ORDER — SIMVASTATIN 20 MG
20 TABLET ORAL
Qty: 90 TABLET | Refills: 1 | Status: SHIPPED | OUTPATIENT
Start: 2024-04-22

## 2024-04-23 ENCOUNTER — APPOINTMENT (OUTPATIENT)
Facility: HOSPITAL | Age: 54
End: 2024-04-23
Payer: COMMERCIAL

## 2024-04-24 ENCOUNTER — OFFICE VISIT (OUTPATIENT)
Age: 54
End: 2024-04-24
Payer: COMMERCIAL

## 2024-04-24 VITALS — WEIGHT: 155 LBS | HEIGHT: 65 IN | BODY MASS INDEX: 25.83 KG/M2

## 2024-04-24 DIAGNOSIS — S82.032D CLOSED DISPLACED TRANSVERSE FRACTURE OF LEFT PATELLA WITH ROUTINE HEALING, SUBSEQUENT ENCOUNTER: Primary | ICD-10-CM

## 2024-04-24 PROCEDURE — 99213 OFFICE O/P EST LOW 20 MIN: CPT | Performed by: PHYSICIAN ASSISTANT

## 2024-04-24 NOTE — PROGRESS NOTES
1-2V             PLAN:   1.  Patient with marked improvement postoperatively.  She will continue with physical therapy working on strengthening.  Gradually increase her activities be mindful of pain and discomfort.  Occasional ibuprofen if her pain or soreness increases.  If she is having more pain that lasts longer than a day or 2 she is to call our office immediately.  She will return to work without any restrictions  Risk factors include: N/A  2. No cortisone injection indicated today  3. Yes Physical/Occupational Therapy indicated today  4. No diagnostic test indicated today   5. No durable medical equipment indicated today  6. No referral indicated today   7. No medications indicated today:   8. No Narcotic indicated today       RTC {PRN   Michelle Larsen PA-C  [facility]

## 2024-04-26 ENCOUNTER — HOSPITAL ENCOUNTER (OUTPATIENT)
Facility: HOSPITAL | Age: 54
Setting detail: RECURRING SERIES
Discharge: HOME | End: 2024-04-29
Payer: COMMERCIAL

## 2024-04-26 PROCEDURE — 97140 MANUAL THERAPY 1/> REGIONS: CPT

## 2024-04-26 PROCEDURE — 97530 THERAPEUTIC ACTIVITIES: CPT

## 2024-04-26 PROCEDURE — 97110 THERAPEUTIC EXERCISES: CPT

## 2024-04-26 NOTE — PROGRESS NOTES
PHYSICAL / OCCUPATIONAL THERAPY - DAILY TREATMENT NOTE    Patient Name: Celine Ramirez    Date: 2024    : 1970  Insurance: Payor: [facility] / Plan: [facility] EMPLOYEES / Product Type: *No Product type* /      Patient  verified Yes     Visit #   Current / Total 2 10   Time   In / Out 414 pm  501 pm    Pain   In / Out 0/10  0/10    Subjective Functional Status/Changes: Patient reports feeling a lot better and can not recall the last time she had pain.      TREATMENT AREA =  Pain in left knee [M25.562]    OBJECTIVE         Therapeutic Procedures:    Tx Min Billable or 1:1 Min (if diff from Tx Min) Procedure, Rationale, Specifics   14  82717 Therapeutic Exercise (timed):  increase ROM, strength, coordination, balance, and proprioception to improve patient's ability to progress to PLOF and address remaining functional goals. (see flow sheet as applicable)     Details if applicable:         35982 Therapeutic Activity (timed):  use of dynamic activities replicating functional movements to increase ROM, strength, coordination, balance, and proprioception in order to improve patient's ability to progress to PLOF and address remaining functional goals.  (see flow sheet as applicable)     Details if applicable:     10  04994 Manual Therapy (timed):  decrease pain, increase ROM, increase tissue extensibility, decrease trigger points, and increase postural awareness to improve patient's ability to progress to PLOF and address remaining functional goals.  The manual therapy interventions were performed at a separate and distinct time from the therapeutic activities interventions . (see flow sheet as applicable)     Details if applicable:            Details if applicable:            Details if applicable:     47  [facility] Totals Reminder: bill using total billable min of TIMED therapeutic procedures (example: do not include dry needle or estim unattended, both untimed codes, in totals to left)  8-22 min = 1 unit;

## 2024-04-29 ENCOUNTER — APPOINTMENT (OUTPATIENT)
Facility: HOSPITAL | Age: 54
End: 2024-04-29
Payer: COMMERCIAL

## 2024-04-29 ENCOUNTER — HOSPITAL ENCOUNTER (EMERGENCY)
Facility: HOSPITAL | Age: 54
Discharge: HOME | End: 2024-04-29
Attending: EMERGENCY MEDICINE
Payer: COMMERCIAL

## 2024-04-29 VITALS
HEART RATE: 74 BPM | OXYGEN SATURATION: 99 % | DIASTOLIC BLOOD PRESSURE: 65 MMHG | RESPIRATION RATE: 15 BRPM | SYSTOLIC BLOOD PRESSURE: 149 MMHG | TEMPERATURE: 98.3 F | HEIGHT: 66 IN | WEIGHT: 150 LBS | BODY MASS INDEX: 24.11 KG/M2

## 2024-04-29 DIAGNOSIS — R22.0 FACIAL SWELLING: Primary | ICD-10-CM

## 2024-04-29 DIAGNOSIS — K11.20 PAROTITIS: ICD-10-CM

## 2024-04-29 LAB
ANION GAP SERPL CALC-SCNC: 3 MMOL/L (ref 3–18)
BASOPHILS # BLD: 0 K/UL (ref 0–0.1)
BASOPHILS NFR BLD: 1 % (ref 0–2)
BUN SERPL-MCNC: 8 MG/DL (ref 7–18)
BUN/CREAT SERPL: 12 (ref 12–20)
CALCIUM SERPL-MCNC: 9.8 MG/DL (ref 8.5–10.1)
CHLORIDE SERPL-SCNC: 107 MMOL/L (ref 100–111)
CO2 SERPL-SCNC: 31 MMOL/L (ref 21–32)
CREAT SERPL-MCNC: 0.69 MG/DL (ref 0.6–1.3)
DIFFERENTIAL METHOD BLD: ABNORMAL
EOSINOPHIL # BLD: 0.1 K/UL (ref 0–0.4)
EOSINOPHIL NFR BLD: 1 % (ref 0–5)
ERYTHROCYTE [DISTWIDTH] IN BLOOD BY AUTOMATED COUNT: 11.1 % (ref 11.6–14.5)
GLUCOSE SERPL-MCNC: 118 MG/DL (ref 74–99)
HCT VFR BLD AUTO: 39.5 % (ref 35–45)
HGB BLD-MCNC: 14.4 G/DL (ref 12–16)
IMM GRANULOCYTES # BLD AUTO: 0 K/UL (ref 0–0.04)
IMM GRANULOCYTES NFR BLD AUTO: 0 % (ref 0–0.5)
LYMPHOCYTES # BLD: 1.9 K/UL (ref 0.9–3.6)
LYMPHOCYTES NFR BLD: 31 % (ref 21–52)
MCH RBC QN AUTO: 34.4 PG (ref 24–34)
MCHC RBC AUTO-ENTMCNC: 36.5 G/DL (ref 31–37)
MCV RBC AUTO: 94.3 FL (ref 78–100)
MONOCYTES # BLD: 0.8 K/UL (ref 0.05–1.2)
MONOCYTES NFR BLD: 14 % (ref 3–10)
NEUTS SEG # BLD: 3.3 K/UL (ref 1.8–8)
NEUTS SEG NFR BLD: 54 % (ref 40–73)
NRBC # BLD: 0 K/UL (ref 0–0.01)
NRBC BLD-RTO: 0 PER 100 WBC
PLATELET # BLD AUTO: 253 K/UL (ref 135–420)
PMV BLD AUTO: 9 FL (ref 9.2–11.8)
POTASSIUM SERPL-SCNC: 4.2 MMOL/L (ref 3.5–5.5)
RBC # BLD AUTO: 4.19 M/UL (ref 4.2–5.3)
SODIUM SERPL-SCNC: 141 MMOL/L (ref 136–145)
WBC # BLD AUTO: 6.2 K/UL (ref 4.6–13.2)

## 2024-04-29 PROCEDURE — 80048 BASIC METABOLIC PNL TOTAL CA: CPT

## 2024-04-29 PROCEDURE — 6360000002 HC RX W HCPCS: Performed by: EMERGENCY MEDICINE

## 2024-04-29 PROCEDURE — 85025 COMPLETE CBC W/AUTO DIFF WBC: CPT

## 2024-04-29 PROCEDURE — 96374 THER/PROPH/DIAG INJ IV PUSH: CPT

## 2024-04-29 PROCEDURE — 99285 EMERGENCY DEPT VISIT HI MDM: CPT

## 2024-04-29 PROCEDURE — 6360000004 HC RX CONTRAST MEDICATION: Performed by: NURSE PRACTITIONER

## 2024-04-29 PROCEDURE — 70491 CT SOFT TISSUE NECK W/DYE: CPT

## 2024-04-29 PROCEDURE — 6370000000 HC RX 637 (ALT 250 FOR IP): Performed by: EMERGENCY MEDICINE

## 2024-04-29 RX ORDER — AMOXICILLIN AND CLAVULANATE POTASSIUM 875; 125 MG/1; MG/1
1 TABLET, FILM COATED ORAL 2 TIMES DAILY
Qty: 20 TABLET | Refills: 0 | Status: SHIPPED | OUTPATIENT
Start: 2024-04-29 | End: 2024-05-09

## 2024-04-29 RX ORDER — IBUPROFEN 600 MG/1
600 TABLET ORAL 4 TIMES DAILY PRN
Qty: 20 TABLET | Refills: 1 | Status: SHIPPED | OUTPATIENT
Start: 2024-04-29

## 2024-04-29 RX ORDER — AMOXICILLIN AND CLAVULANATE POTASSIUM 875; 125 MG/1; MG/1
1 TABLET, FILM COATED ORAL
Status: COMPLETED | OUTPATIENT
Start: 2024-04-29 | End: 2024-04-29

## 2024-04-29 RX ORDER — KETOROLAC TROMETHAMINE 15 MG/ML
30 INJECTION, SOLUTION INTRAMUSCULAR; INTRAVENOUS
Status: COMPLETED | OUTPATIENT
Start: 2024-04-29 | End: 2024-04-29

## 2024-04-29 RX ADMIN — AMOXICILLIN AND CLAVULANATE POTASSIUM 1 TABLET: 875; 125 TABLET, FILM COATED ORAL at 22:36

## 2024-04-29 RX ADMIN — IOPAMIDOL 80 ML: 755 INJECTION, SOLUTION INTRAVENOUS at 21:07

## 2024-04-29 RX ADMIN — KETOROLAC TROMETHAMINE 30 MG: 15 INJECTION, SOLUTION INTRAMUSCULAR; INTRAVENOUS at 20:10

## 2024-04-29 ASSESSMENT — PAIN - FUNCTIONAL ASSESSMENT: PAIN_FUNCTIONAL_ASSESSMENT: 0-10

## 2024-04-29 ASSESSMENT — PAIN SCALES - GENERAL
PAINLEVEL_OUTOF10: 4
PAINLEVEL_OUTOF10: 5
PAINLEVEL_OUTOF10: 2

## 2024-04-29 ASSESSMENT — PAIN DESCRIPTION - ORIENTATION: ORIENTATION: RIGHT

## 2024-04-29 ASSESSMENT — PAIN DESCRIPTION - LOCATION: LOCATION: FACE;EAR

## 2024-04-29 ASSESSMENT — LIFESTYLE VARIABLES
HOW MANY STANDARD DRINKS CONTAINING ALCOHOL DO YOU HAVE ON A TYPICAL DAY: 1 OR 2
HOW OFTEN DO YOU HAVE A DRINK CONTAINING ALCOHOL: MONTHLY OR LESS

## 2024-04-29 NOTE — ED PROVIDER NOTES
(Results Pending)           PROCEDURES   Unless otherwise noted below, none  Procedures       CRITICAL CARE TIME   None      FINAL IMPRESSION     1. Facial swelling         DISPOSITION/PLAN   DISPOSITION       PATIENT REFERRED TO:  No follow-up provider specified.       DISCHARGE MEDICATIONS:     Medication List        ASK your doctor about these medications      acetaminophen 500 MG tablet  Commonly known as: TYLENOL  Take 2 tablets by mouth 4 times daily as needed for Pain     * aspirin 81 MG chewable tablet     * aspirin 325 MG tablet     Cholecalciferol 1.25 MG (50153 UT) Tabs     doxycycline monohydrate 100 MG capsule  Commonly known as: MONODOX     * gabapentin 100 MG capsule  Commonly known as: NEURONTIN  Take 1 capsule by mouth 3 times daily for 30 days. Attending ESTELLA Champagne [id number] Max Daily Amount: 300 mg     * gabapentin 100 MG capsule  Commonly known as: NEURONTIN  Take 1 capsule by mouth 3 times daily for 30 days. Attending ESTELLA Champagne [id number] Max Daily Amount: 300 mg     ibuprofen 800 MG tablet  Commonly known as: ADVIL;MOTRIN     * levothyroxine 125 MCG tablet  Commonly known as: SYNTHROID  TAKE ONE TABLET BY MOUTH EVERY MORNING BEFORE BREAKFAST     * levothyroxine 125 MCG tablet  Commonly known as: SYNTHROID  TAKE ONE TABLET BY MOUTH IN THE MORNING BEFORE BREAKFAST     loratadine 10 MG tablet  Commonly known as: CLARITIN     MULTIVITAMIN ADULT PO     simvastatin 20 MG tablet  Commonly known as: ZOCOR  TAKE ONE TABLET BY MOUTH EVERY NIGHT     Xarelto 10 MG Tabs tablet  Generic drug: rivaroxaban  Take 1 tablet by mouth daily (with breakfast)           * This list has 6 medication(s) that are the same as other medications prescribed for you. Read the directions carefully, and ask your doctor or other care provider to review them with you.                 I am the Primary Clinician of Record.       (Please note that parts of this dictation were completed with voice recognition software. Quite often

## 2024-04-29 NOTE — ED TRIAGE NOTES
Pt to ED for eval of sudden onset swelling/pain to right jaw/ear that started while pt was eating dinner. Right mandibular swelling noted in triage.

## 2024-04-30 NOTE — ED NOTES
Discharge instructions and medications reviewed with the patient. Patient in stable condition at discharge. Ambulatory with a steady gait.

## 2024-05-01 ENCOUNTER — PATIENT MESSAGE (OUTPATIENT)
Age: 54
End: 2024-05-01

## 2024-05-01 ENCOUNTER — HOSPITAL ENCOUNTER (OUTPATIENT)
Facility: HOSPITAL | Age: 54
Setting detail: RECURRING SERIES
Discharge: HOME | End: 2024-05-04
Payer: COMMERCIAL

## 2024-05-01 PROCEDURE — 97032 APPL MODALITY 1+ESTIM EA 15: CPT

## 2024-05-01 PROCEDURE — 97530 THERAPEUTIC ACTIVITIES: CPT

## 2024-05-01 PROCEDURE — 97110 THERAPEUTIC EXERCISES: CPT

## 2024-05-01 PROCEDURE — 97140 MANUAL THERAPY 1/> REGIONS: CPT

## 2024-05-01 NOTE — PROGRESS NOTES
PHYSICAL / OCCUPATIONAL THERAPY - DAILY TREATMENT NOTE    Patient Name: Celine Ramirez    Date: 2024    : 1970  Insurance: Payor: [facility] / Plan: [facility] EMPLOYEES / Product Type: *No Product type* /      Patient  verified Yes     Visit #   Current / Total 3 10   Time   In / Out 450 pm  545 pm    Pain   In / Out 0/10 010    Subjective Functional Status/Changes: Patient reports no left knee pain today.      TREATMENT AREA =  Pain in left knee [M25.562]    OBJECTIVE    Modalities Rationale:     increase muscle contraction/control to improve patient's ability to progress to PLOF and address remaining functional goals.     min [] Estim Unattended, type/location:                                      []  w/ice    []  w/heat   10 min [] Estim Attended, type/location: Left quad                                    []  w/US     []  w/ice    []  w/heat    []  TENS insruct      min []  Mechanical Traction: type/lbs                   []  pro   []  sup   []  int   []  cont    []  before manual    []  after manual    min []  Ultrasound, settings/location:      min  unbill []  Ice     []  Heat    location/position:     min []  Paraffin,  details:     min []  Vasopneumatic Device, press/temp:     min []  Whirlpool / Fluido:    If using vaso (only need to measure limb vaso being performed on)      pre-treatment girth :       post-treatment girth :       measured at (landmark location) :      min []  Other:    Skin assessment post-treatment:   Intact      Therapeutic Procedures:    Tx Min Billable or 1:1 Min (if diff from Tx Min) Procedure, Rationale, Specifics   10  15304 Therapeutic Exercise (timed):  increase ROM, strength, coordination, balance, and proprioception to improve patient's ability to progress to PLOF and address remaining functional goals. (see flow sheet as applicable)     Details if applicable:       36 [id number] Therapeutic Activity (timed):  use of dynamic activities replicating functional movements

## 2024-05-02 ENCOUNTER — TELEPHONE (OUTPATIENT)
Age: 54
End: 2024-05-02

## 2024-05-02 NOTE — TELEPHONE ENCOUNTER
----- Message from Celine Ramirez sent at 5/1/2024  1:46 PM EDT -----  Regarding: Antibiotic  Contact: [phone]  Hi Dr. León,  This antibiotic is causing diarrhea, should I continue taking it?  Thank you,  Celine

## 2024-05-03 ENCOUNTER — HOSPITAL ENCOUNTER (OUTPATIENT)
Facility: HOSPITAL | Age: 54
Setting detail: RECURRING SERIES
Discharge: HOME | End: 2024-05-06
Payer: COMMERCIAL

## 2024-05-03 PROCEDURE — 97140 MANUAL THERAPY 1/> REGIONS: CPT

## 2024-05-03 PROCEDURE — 97032 APPL MODALITY 1+ESTIM EA 15: CPT

## 2024-05-03 PROCEDURE — 97530 THERAPEUTIC ACTIVITIES: CPT

## 2024-05-03 PROCEDURE — 97110 THERAPEUTIC EXERCISES: CPT

## 2024-05-03 NOTE — TELEPHONE ENCOUNTER
Her diarrhea sx are \"tapering down.\" Her sx are improving. We will thus proceed with observation.      Dr. Narcisa León  [facility] [address], [facility] [address]  [place] [address]  Phone: [phone]  Fax: [phone]

## 2024-05-03 NOTE — PROGRESS NOTES
PHYSICAL / OCCUPATIONAL THERAPY - DAILY TREATMENT NOTE    Patient Name: Celine Ramirez    Date: 5/3/2024    : 1970  Insurance: Payor: [facility] / Plan: [facility] EMPLOYEES / Product Type: *No Product type* /      Patient  verified Yes     Visit #   Current / Total 4 10   Time   In / Out 332 pm  425 pm   Pain   In / Out 0/10  0/10    Subjective Functional Status/Changes: Patient reports increased soreness after last session.      TREATMENT AREA =  Pain in left knee [M25.562]    OBJECTIVE    Modalities Rationale:     increase muscle contraction/control to improve patient's ability to progress to PLOF and address remaining functional goals.     min [] Estim Unattended, type/location:                                      []  w/ice    []  w/heat   10 min [] Estim Attended, type/location: Left quad (10 on 30 off)                                     []  w/US     []  w/ice    []  w/heat    []  TENS insruct      min []  Mechanical Traction: type/lbs                   []  pro   []  sup   []  int   []  cont    []  before manual    []  after manual    min []  Ultrasound, settings/location:      min  unbill []  Ice     []  Heat    location/position:     min []  Paraffin,  details:     min []  Vasopneumatic Device, press/temp:     min []  Whirlpool / Fluido:    If using vaso (only need to measure limb vaso being performed on)      pre-treatment girth :       post-treatment girth :       measured at (landmark location) :      min []  Other:    Skin assessment post-treatment:   Intact      Therapeutic Procedures:    Tx Min Billable or 1:1 Min (if diff from Tx Min) Procedure, Rationale, Specifics   10  33436 Therapeutic Exercise (timed):  increase ROM, strength, coordination, balance, and proprioception to improve patient's ability to progress to PLOF and address remaining functional goals. (see flow sheet as applicable)     Details if applicable:       29 43577 Therapeutic Activity (timed):  use of dynamic activities

## 2024-05-03 NOTE — TELEPHONE ENCOUNTER
From: Celine Ramirez  To: Dr. Narcisa León  Sent: 5/1/2024 1:46 PM EDT  Subject: Antibiotic    Hi Dr. León,  This antibiotic is causing diarrhea, should I continue taking it?  Thank you,  Celine

## 2024-05-08 ENCOUNTER — HOSPITAL ENCOUNTER (OUTPATIENT)
Facility: HOSPITAL | Age: 54
Setting detail: RECURRING SERIES
Discharge: HOME | End: 2024-05-11
Payer: COMMERCIAL

## 2024-05-08 PROCEDURE — 97032 APPL MODALITY 1+ESTIM EA 15: CPT

## 2024-05-08 PROCEDURE — 97110 THERAPEUTIC EXERCISES: CPT

## 2024-05-08 PROCEDURE — 97140 MANUAL THERAPY 1/> REGIONS: CPT

## 2024-05-08 PROCEDURE — 97530 THERAPEUTIC ACTIVITIES: CPT

## 2024-05-08 NOTE — PROGRESS NOTES
Marcellus, PTA [facility]   5/10/2024 12:10 PM Marcellus Hernandez, PTA [facility]   5/13/2024  3:30 PM Marcellus Hernandez, PTA [facility]   5/16/2024  4:10 PM Ibarra, Janina, PT [facility]   5/20/2024  4:10 PM Ibarra, Janina, PT [facility]   5/23/2024  4:10 PM Ibarra, Janina, PT [facility]   5/28/2024  4:10 PM Ibarra, Janina, PT [facility]   5/30/2024  4:10 PM Ibarra, Janina, PT [facility]

## 2024-05-10 ENCOUNTER — HOSPITAL ENCOUNTER (OUTPATIENT)
Facility: HOSPITAL | Age: 54
Setting detail: RECURRING SERIES
Discharge: HOME | End: 2024-05-13
Payer: COMMERCIAL

## 2024-05-10 PROCEDURE — 97530 THERAPEUTIC ACTIVITIES: CPT

## 2024-05-10 PROCEDURE — 97140 MANUAL THERAPY 1/> REGIONS: CPT

## 2024-05-10 PROCEDURE — 97110 THERAPEUTIC EXERCISES: CPT

## 2024-05-10 NOTE — PROGRESS NOTES
PHYSICAL / OCCUPATIONAL THERAPY - DAILY TREATMENT NOTE    Patient Name: Celine Ramirez    Date: 5/10/2024    : 1970  Insurance: Payor: [facility] / Plan: [facility] EMPLOYEES / Product Type: *No Product type* /      Patient  verified Yes     Visit #   Current / Total 6 10   Time   In / Out 1214 pm 1256 pm    Pain   In / Out 0/10  010    Subjective Functional Status/Changes: Patient explains that she did all of her exercises last night and realized she did too many. \"I'm really sore.\"      TREATMENT AREA =  Pain in left knee [M25.562]    OBJECTIVE        Therapeutic Procedures:    Tx Min Billable or 1:1 Min (if diff from Tx Min) Procedure, Rationale, Specifics   11  73668 Therapeutic Exercise (timed):  increase ROM, strength, coordination, balance, and proprioception to improve patient's ability to progress to PLOF and address remaining functional goals. (see flow sheet as applicable)     Details if applicable:       23  89963 Therapeutic Activity (timed):  use of dynamic activities replicating functional movements to increase ROM, strength, coordination, balance, and proprioception in order to improve patient's ability to progress to PLOF and address remaining functional goals.  (see flow sheet as applicable)     Details if applicable:     8  30907 Manual Therapy (timed):  decrease pain, increase ROM, increase tissue extensibility, decrease trigger points, and increase postural awareness to improve patient's ability to progress to PLOF and address remaining functional goals.  The manual therapy interventions were performed at a separate and distinct time from the therapeutic activities interventions . (see flow sheet as applicable)     Details if applicable:  Shot gun mobs/MET; STM to left knee musculature           Details if applicable:            Details if applicable:     42  [facility] Totals Reminder: bill using total billable min of TIMED therapeutic procedures (example: do not include dry needle or estim

## 2024-05-13 ENCOUNTER — HOSPITAL ENCOUNTER (OUTPATIENT)
Facility: HOSPITAL | Age: 54
Setting detail: RECURRING SERIES
Discharge: HOME | End: 2024-05-16
Payer: COMMERCIAL

## 2024-05-13 PROCEDURE — 97530 THERAPEUTIC ACTIVITIES: CPT

## 2024-05-13 PROCEDURE — 97140 MANUAL THERAPY 1/> REGIONS: CPT

## 2024-05-13 PROCEDURE — 97110 THERAPEUTIC EXERCISES: CPT

## 2024-05-13 NOTE — PROGRESS NOTES
[facility]  [address], [address], [place] [address]  Ph:[phone] Fx:[phone]  PHYSICAL THERAPY PROGRESS NOTE  Patient Name: Celine Ramirez : 1970   Treatment/Medical Diagnosis: Pain in left knee [M25.562]   Referral Source: Michelle Larsen PA     Date of Initial Visit: 3/15/24 Attended Visits: 14 Missed Visits: 2     SUMMARY OF TREATMENT  PT intervention has consisted of therapeutic exercise, functional activities, manual therapy, neuromuscular re-education and HEP to improve left quad strength to improve patient's ability to perform functional activities with ease.     CURRENT STATUS    Patient has attended 14 total PT sessions and is progressing well. Patient is now able to ambulate with no AD or TROM. FOTO assessment score has improved since the start of PT. Patient reports improvements with walking, bending, performing light household duties and performing yard work. Patient continues to report left quad weakness with stair negotiation, prolonged walking and standing, kneeling and stooping down to pull weeds. Patient reports 80% improvement with PT and will continue to benefit from skilled PT / OT services to modify and progress therapeutic interventions, analyze and address functional mobility deficits, analyze and address ROM deficits, analyze and address strength deficits, analyze and address soft tissue restrictions, analyze and cue for proper movement patterns, analyze and modify for postural abnormalities, and instruct in home and community integration to address functional deficits and attain remaining goals.       1. Patient will have pain 0-1/10 to aid with increase tolerance to ADLS and regular daily activities at home and in the community  PN: Patient reports 1/10 pain today.   Current: Patient reports 0/10 pain today. MET      2. Patient will have FOTO 50 to show increase tolerance to ADLs and activities at home and 
have FOTO 50 to show increase tolerance to ADLs and activities at home and in the community  PN: 40 points.   Current: 58 points. MET     3. Patient will ambulate 570' with least restrictive device and no LOB for carryover to community activities  PN: 380 ft with right SPC and TROM brace.   Current: Patient can ambulate 570 ft with no AD, TROM and no increased reports of pain. MET      4. Patient will negotiate 6\" stairs x 4 with no TROM brace, reciprocal pattern and one HR in order to improve patients tolerance for stairs in order to perform household duties with ease.  PN: Patient able to negotiate 4'' stairs with B HR.  Current- Patient able to negotiate 6\" stairs x 4 with no TROM brace, reciprocal pattern and B HR. Progressing     5. Patient will perform a set of 10 SLR with no extension lag or reports of left knee pain in order to improve left quad strength for performing ADL's with ease.  PN: Patient reports increased left knee pain with SLR.   Current: Patient able to perform 15 consecutive SLR's with no assistance or reports of pain. MET     Next PN/ RC due 5/15/24  Auth due (visit number/ date) 30 visits  24    PLAN  - Continue Plan of Care  - Upgrade activities as tolerated    Marcellus Hernandez PTA    2024    9:00 AM    Future Appointments   Date Time Provider Department Center   2024  3:30 PM Marcellus Hernandez PTA [facility]   2024  4:10 PM IbarraJanina Cobb, PT [facility]   2024  4:10 PM IbarraYaneth Cobberie, PT [facility]   2024  4:10 PM Ibarra Janina, PT [facility]   2024  4:10 PM IbarraYaneth Cobberie, PT [facility]   2024  4:10 PM Ibarra, Janina, PT [facility]

## 2024-05-16 ENCOUNTER — HOSPITAL ENCOUNTER (OUTPATIENT)
Facility: HOSPITAL | Age: 54
Setting detail: RECURRING SERIES
Discharge: HOME | End: 2024-05-19
Payer: COMMERCIAL

## 2024-05-16 PROCEDURE — 97110 THERAPEUTIC EXERCISES: CPT

## 2024-05-16 PROCEDURE — 97530 THERAPEUTIC ACTIVITIES: CPT

## 2024-05-16 NOTE — PROGRESS NOTES
PHYSICAL / OCCUPATIONAL THERAPY - DAILY TREATMENT NOTE    Patient Name: Celine Ramirez    Date: 2024    : 1970  Insurance: Payor: [facility] / Plan: [employer] [facility] [employer] EMPLOYEES / Product Type: *No Product type* /      Patient  verified Yes     Visit #   Current / Total 1 10   Time   In / Out 4:11 pm  4:49 pm    Pain   In / Out 0/10  0/10    Subjective Functional Status/Changes: Patient reports that she is sore from doing yard work.      TREATMENT AREA =  Pain in left knee [M25.562]    OBJECTIVE      Therapeutic Procedures:    Tx Min Billable or 1:1 Min (if diff from Tx Min) Procedure, Rationale, Specifics   10  17829 Therapeutic Exercise (timed):  increase ROM, strength, coordination, balance, and proprioception to improve patient's ability to progress to PLOF and address remaining functional goals. (see flow sheet as applicable)     Details if applicable:       28  39777 Therapeutic Activity (timed):  use of dynamic activities replicating functional movements to increase ROM, strength, coordination, balance, and proprioception in order to improve patient's ability to progress to PLOF and address remaining functional goals.  (see flow sheet as applicable)     Details if applicable:     X  57869 Manual Therapy (timed):  decrease pain, increase ROM, increase tissue extensibility, decrease trigger points, and increase postural awareness to improve patient's ability to progress to PLOF and address remaining functional goals.  The manual therapy interventions were performed at a separate and distinct time from the therapeutic activities interventions . (see flow sheet as applicable)     Details if applicable:  Shot gun mobs/MET; STM to left knee musculature           Details if applicable:            Details if applicable:     38  [facility] Totals Reminder: bill using total billable min of TIMED therapeutic procedures (example: do not include dry needle or estim unattended, both untimed codes, in totals to left)  8-

## 2024-05-20 ENCOUNTER — HOSPITAL ENCOUNTER (OUTPATIENT)
Facility: HOSPITAL | Age: 54
Setting detail: RECURRING SERIES
Discharge: HOME | End: 2024-05-23
Payer: COMMERCIAL

## 2024-05-20 PROCEDURE — 97112 NEUROMUSCULAR REEDUCATION: CPT

## 2024-05-20 PROCEDURE — 97530 THERAPEUTIC ACTIVITIES: CPT

## 2024-05-20 PROCEDURE — 97110 THERAPEUTIC EXERCISES: CPT

## 2024-05-20 NOTE — PROGRESS NOTES
left)  8-22 min = 1 unit; 23-37 min = 2 units; 38-52 min = 3 units; 53-67 min = 4 units; 68-82 min = 5 units   Total Total     [x]  Patient Education billed concurrently with other procedures   [x] Review HEP    [] Progressed/Changed HEP, detail:    [] Other detail:       Objective Information/Functional Measures/Assessment    VC exercises and tech  Progressing as able and as expected  C/O right calf pain/soreness and held HR/TR and she questions Musculoskeletal soreness related to being on tip toes hanging plants.     Patient will continue to benefit from skilled PT / OT services to modify and progress therapeutic interventions, analyze and address functional mobility deficits, analyze and address ROM deficits, analyze and address strength deficits, analyze and address soft tissue restrictions, analyze and cue for proper movement patterns, analyze and modify for postural abnormalities, and instruct in home and community integration to address functional deficits and attain remaining goals.    Progress toward goals / Updated goals:  []  See Progress Note/Recertification    New Goals to be achieved in __5__ WEEKS     1. Patient will negotiate 6\" stairs x 4 with no TROM brace, reciprocal pattern and one HR in order to improve patients tolerance for stairs in order to perform household duties with ease.  PN: Patient able to negotiate 6\" stairs x 4 with no TROM brace, reciprocal pattern and B HR.  CURRENT 6\" 4x ascending reciprocal and descending step to pattern 1-2 rails 5/20/24     2. Patient will improve left knee flex and hip abduction MMT to 4+/5 in order to improve left LE strength for performing moderate household duties and yard work with ease.   PN: Left knee flex and hip abduction MMT: 4-/5  CURRENT NA 5/20/24     3. Patient will report being able to carry her 30# grandchild with no increased reports of left knee pain in order to improve tolerance for taking care of them.  PN: Patient reports increased left

## 2024-05-23 ENCOUNTER — HOSPITAL ENCOUNTER (OUTPATIENT)
Facility: HOSPITAL | Age: 54
Setting detail: RECURRING SERIES
Discharge: HOME | End: 2024-05-26
Payer: COMMERCIAL

## 2024-05-23 PROCEDURE — 97535 SELF CARE MNGMENT TRAINING: CPT

## 2024-05-23 PROCEDURE — 97110 THERAPEUTIC EXERCISES: CPT

## 2024-05-23 PROCEDURE — 97530 THERAPEUTIC ACTIVITIES: CPT

## 2024-05-23 NOTE — PROGRESS NOTES
PHYSICAL / OCCUPATIONAL THERAPY - DAILY TREATMENT NOTE    Patient Name: Celine Ramirez    Date: 2024    : 1970  Insurance: Payor: [facility] / Plan: [facility] EMPLOYEES / Product Type: *No Product type* /      Patient  verified Yes     Visit #   Current / Total 3 10   Time   In / Out 413 500   Pain   In / Out 3 3   Subjective Functional Status/Changes: States I think I messed up and did something to it yesterday while doing the exercises- after the exercises in the morning - had increase pain and swelling by 4 PM. I think I over did it with the exercises. I iced it multiple times and it started feeling better. Today it looks almost normal again.      TREATMENT AREA =  Pain in left knee [M25.562]     OBJECTIVE    Modalities Rationale:     decrease edema, decrease pain, and increase tissue extensibility to improve patient's ability to progress to PLOF and address remaining functional goals.     min [] Estim Unattended, type/location:                                      []  w/ice    []  w/heat    min [] Estim Attended, type/location:                                     []  w/US     []  w/ice    []  w/heat    []  TENS insruct      min []  Mechanical Traction: type/lbs                   []  pro   []  sup   []  int   []  cont    []  before manual    []  after manual    min []  Ultrasound, settings/location:     10 min  unbill [x]  Ice  post    []  Heat    location/position: reclined, to left knee     min []  Paraffin,  details:     min []  Vasopneumatic Device, press/temp:     min []  Whirlpool / Fluido:    If using vaso (only need to measure limb vaso being performed on)      pre-treatment girth :       post-treatment girth :       measured at (landmark location) :      min []  Other:    Skin assessment post-treatment:   Redness, no adverse reactions      Therapeutic Procedures:    Tx Min Billable or 1:1 Min (if diff from Tx Min) Procedure, Rationale, Specifics   14 14 [id number] Therapeutic Exercise (timed):

## 2024-05-28 ENCOUNTER — HOSPITAL ENCOUNTER (OUTPATIENT)
Facility: HOSPITAL | Age: 54
Setting detail: RECURRING SERIES
Discharge: HOME | End: 2024-05-31
Payer: COMMERCIAL

## 2024-05-28 PROCEDURE — 97112 NEUROMUSCULAR REEDUCATION: CPT

## 2024-05-28 PROCEDURE — 97535 SELF CARE MNGMENT TRAINING: CPT

## 2024-05-28 PROCEDURE — 97110 THERAPEUTIC EXERCISES: CPT

## 2024-05-28 PROCEDURE — 97530 THERAPEUTIC ACTIVITIES: CPT

## 2024-05-28 NOTE — PROGRESS NOTES
PHYSICAL / OCCUPATIONAL THERAPY - DAILY TREATMENT NOTE    Patient Name: Celine Ramirez    Date: 2024    : 1970  Insurance: Payor: [facility] / Plan: [facility] EMPLOYEES / Product Type: *No Product type* /      Patient  verified Yes     Visit #   Current / Total 4 10   Time   In / Out 411 450   Pain   In / Out 1 0   Subjective Functional Status/Changes: It is feeling better today.      TREATMENT AREA =  Pain in left knee [M25.562]     OBJECTIVE    PD CP today     Therapeutic Procedures:    Tx Min Billable or 1:1 Min (if diff from Tx Min) Procedure, Rationale, Specifics   13  66162 Therapeutic Exercise (timed):  increase ROM, strength, coordination, balance, and proprioception to improve patient's ability to progress to PLOF and address remaining functional goals. (see flow sheet as applicable)     Details if applicable:       10 10 33896 Therapeutic Activity (timed):  use of dynamic activities replicating functional movements to increase ROM, strength, coordination, balance, and proprioception in order to improve patient's ability to progress to PLOF and address remaining functional goals.  (see flow sheet as applicable)     Details if applicable:      93202 Neuromuscular Re-Education (timed):  improve balance, coordination, kinesthetic sense, posture, core stability and proprioception to improve patient's ability to develop conscious control of individual muscles and awareness of position of extremities in order to progress to PLOF and address remaining functional goals. (see flow sheet as applicable)     Details if applicable:     8  57932 Self Care/Home Management (timed):  improve patient knowledge and understanding of pain reducing techniques, positioning, home safety, and activity modification  to improve patient's ability to progress to PLOF and address remaining functional goals.  (see flow sheet as applicable)     Details if applicable:            Details if applicable:     39 39  BC

## 2024-05-30 ENCOUNTER — APPOINTMENT (OUTPATIENT)
Facility: HOSPITAL | Age: 54
End: 2024-05-30
Payer: COMMERCIAL

## 2024-06-05 ENCOUNTER — HOSPITAL ENCOUNTER (OUTPATIENT)
Facility: HOSPITAL | Age: 54
Setting detail: RECURRING SERIES
Discharge: HOME | End: 2024-06-08
Payer: COMMERCIAL

## 2024-06-05 PROCEDURE — 97530 THERAPEUTIC ACTIVITIES: CPT

## 2024-06-05 PROCEDURE — 97110 THERAPEUTIC EXERCISES: CPT

## 2024-06-05 PROCEDURE — 97140 MANUAL THERAPY 1/> REGIONS: CPT

## 2024-06-05 NOTE — PROGRESS NOTES
PHYSICAL / OCCUPATIONAL THERAPY - DAILY TREATMENT NOTE    Patient Name: Celine Ramirez    Date: 2024    : 1970  Insurance: Payor: [facility] / Plan: [facility] EMPLOYEES / Product Type: *No Product type* /      Patient  verified Yes     Visit #   Current / Total  10   Time   In / Out 409 pm  457 pm    Pain   In / Out 0/10  0/10    Subjective Functional Status/Changes: Patient reports attending a festival this weekend where she had to walk a lot and walk on a lot of different terrain. Patient explains after standing for awhile her knee started to buckle and when she went to sit down her knee buckled again and caused her to fall onto a bench.     TREATMENT AREA =  Pain in left knee [M25.562]     OBJECTIVE         Therapeutic Procedures:    Tx Min Billable or 1:1 Min (if diff from Tx Min) Procedure, Rationale, Specifics   23  46943 Therapeutic Exercise (timed):  increase ROM, strength, coordination, balance, and proprioception to improve patient's ability to progress to PLOF and address remaining functional goals. (see flow sheet as applicable)     Details if applicable:       15  01886 Therapeutic Activity (timed):  use of dynamic activities replicating functional movements to increase ROM, strength, coordination, balance, and proprioception in order to improve patient's ability to progress to PLOF and address remaining functional goals.  (see flow sheet as applicable)     Details if applicable:     10  71586 Manual Therapy (timed):  decrease pain, increase ROM, increase tissue extensibility, and decrease trigger points to improve patient's ability to progress to PLOF and address remaining functional goals.  The manual therapy interventions were performed at a separate and distinct time from the therapeutic activities interventions . (see flow sheet as applicable)     Details if applicable:            Details if applicable:  STM to left knee musculature           Details if applicable:     48  [facility]

## 2024-06-07 ENCOUNTER — HOSPITAL ENCOUNTER (OUTPATIENT)
Facility: HOSPITAL | Age: 54
Setting detail: RECURRING SERIES
Discharge: HOME | End: 2024-06-10
Payer: COMMERCIAL

## 2024-06-07 PROCEDURE — 97535 SELF CARE MNGMENT TRAINING: CPT

## 2024-06-07 PROCEDURE — 97530 THERAPEUTIC ACTIVITIES: CPT

## 2024-06-07 PROCEDURE — 97112 NEUROMUSCULAR REEDUCATION: CPT

## 2024-06-07 PROCEDURE — 97110 THERAPEUTIC EXERCISES: CPT

## 2024-06-07 NOTE — PROGRESS NOTES
PHYSICAL / OCCUPATIONAL THERAPY - DAILY TREATMENT NOTE    Patient Name: Celine Ramirez    Date: 2024    : 1970  Insurance: Payor: [facility] / Plan: [facility] [employer] [facility] EMPLOYEES / Product Type: *No Product type* /      Patient  verified Yes     Visit #   Current / Total 6 10   Time   In / Out 408 449   Pain   In / Out 0 0   Subjective Functional Status/Changes: Denies pain at arrival. Notes incident over the previous weekend of left knee giving out on her after prolonged walking on uneven terrains and states she over did it that weekend.     TREATMENT AREA =  Pain in left knee [M25.562]     OBJECTIVE    PD     Therapeutic Procedures:    Tx Min Billable or 1:1 Min (if diff from Tx Min) Procedure, Rationale, Specifics   13 13 44190 Therapeutic Exercise (timed):  increase ROM, strength, coordination, balance, and proprioception to improve patient's ability to progress to PLOF and address remaining functional goals. (see flow sheet as applicable)     Details if applicable:       12  73055 Therapeutic Activity (timed):  use of dynamic activities replicating functional movements to increase ROM, strength, coordination, balance, and proprioception in order to improve patient's ability to progress to PLOF and address remaining functional goals.  (see flow sheet as applicable)     Details if applicable:     8  92907 Neuromuscular Re-Education (timed):  improve balance, coordination, kinesthetic sense, posture, core stability and proprioception to improve patient's ability to develop conscious control of individual muscles and awareness of position of extremities in order to progress to PLOF and address remaining functional goals. (see flow sheet as applicable)     Details if applicable:     8  37958 Self Care/Home Management (timed):  improve patient knowledge and understanding of pain reducing techniques, positioning, posture/ergonomics, home safety, activity modification, diagnosis/prognosis, physical therapy

## 2024-06-10 ENCOUNTER — HOSPITAL ENCOUNTER (OUTPATIENT)
Facility: HOSPITAL | Age: 54
Setting detail: RECURRING SERIES
Discharge: HOME | End: 2024-06-13
Payer: COMMERCIAL

## 2024-06-10 PROCEDURE — 97535 SELF CARE MNGMENT TRAINING: CPT

## 2024-06-10 PROCEDURE — 97112 NEUROMUSCULAR REEDUCATION: CPT

## 2024-06-10 PROCEDURE — 97110 THERAPEUTIC EXERCISES: CPT

## 2024-06-10 PROCEDURE — 97530 THERAPEUTIC ACTIVITIES: CPT

## 2024-06-10 NOTE — PROGRESS NOTES
PHYSICAL / OCCUPATIONAL THERAPY - DAILY TREATMENT NOTE    Patient Name: Celine Ramirez    Date: 6/10/2024    : 1970  Insurance: Payor: [facility] / Plan: [facility] EMPLOYEES / Product Type: *No Product type* /      Patient  verified Yes     Visit #   Current / Total 7 10   Time   In / Out 410 446   Pain   In / Out 0 0   Subjective Functional Status/Changes: Feeling pretty good today. I swam over the weekend, cleaned the whole house. The stairs are still a problem - definitely getting better, going up one over the other most of the time, coming down is still one at a time.        TREATMENT AREA =  Pain in left knee [M25.562]     OBJECTIVE    PD CP     Therapeutic Procedures:    Tx Min Billable or 1:1 Min (if diff from Tx Min) Procedure, Rationale, Specifics   8  20465 Therapeutic Exercise (timed):  increase ROM, strength, coordination, balance, and proprioception to improve patient's ability to progress to PLOF and address remaining functional goals. (see flow sheet as applicable)     Details if applicable:        33568 Therapeutic Activity (timed):  use of dynamic activities replicating functional movements to increase ROM, strength, coordination, balance, and proprioception in order to improve patient's ability to progress to PLOF and address remaining functional goals.  (see flow sheet as applicable)     Details if applicable:      64195 Neuromuscular Re-Education (timed):  improve balance, coordination, kinesthetic sense, posture, core stability and proprioception to improve patient's ability to develop conscious control of individual muscles and awareness of position of extremities in order to progress to PLOF and address remaining functional goals. (see flow sheet as applicable)     Details if applicable:      51830 Self Care/Home Management (timed):  improve patient knowledge and understanding of pain reducing techniques, positioning, posture/ergonomics, home safety, activity modification, 
Purple DH (Discharge Huddle; Vulnerable Patient)

## 2024-06-12 ENCOUNTER — HOSPITAL ENCOUNTER (OUTPATIENT)
Facility: HOSPITAL | Age: 54
Setting detail: RECURRING SERIES
Discharge: HOME | End: 2024-06-15
Payer: COMMERCIAL

## 2024-06-12 PROCEDURE — 97110 THERAPEUTIC EXERCISES: CPT

## 2024-06-12 PROCEDURE — 97530 THERAPEUTIC ACTIVITIES: CPT

## 2024-06-12 NOTE — PROGRESS NOTES
PHYSICAL / OCCUPATIONAL THERAPY - DAILY TREATMENT NOTE    Patient Name: Celine Ramirez    Date: 2024    : 1970  Insurance: Payor: [facility] / Plan: [employer] [employer] [facility] EMPLOYEES / Product Type: *No Product type* /      Patient  verified Yes     Visit #   Current / Total 8 10   Time   In / Out 406 pm  453 pm    Pain   In / Out 0/10  0/10   Subjective Functional Status/Changes: Patient reports increased glute soreness from Monday's appointment but no knee pain.      TREATMENT AREA =  Pain in left knee [M25.562]     OBJECTIVE      Therapeutic Procedures:    Tx Min Billable or 1:1 Min (if diff from Tx Min) Procedure, Rationale, Specifics   9  56048 Therapeutic Exercise (timed):  increase ROM, strength, coordination, balance, and proprioception to improve patient's ability to progress to PLOF and address remaining functional goals. (see flow sheet as applicable)     Details if applicable:       38  43165 Therapeutic Activity (timed):  use of dynamic activities replicating functional movements to increase ROM, strength, coordination, balance, and proprioception in order to improve patient's ability to progress to PLOF and address remaining functional goals.  (see flow sheet as applicable)     Details if applicable:  FOTO and goals assessed          Details if applicable:            Details if applicable:            Details if applicable:     47  [facility] Totals Reminder: bill using total billable min of TIMED therapeutic procedures (example: do not include dry needle or estim unattended, both untimed codes, in totals to left)  8-22 min = 1 unit; 23-37 min = 2 units; 38-52 min = 3 units; 53-67 min = 4 units; 68-82 min = 5 units   Total Total     [x]  Patient Education billed concurrently with other procedures   [x] Review HEP    [] Progressed/Changed HEP, detail:    [] Other detail:       Objective Information/Functional Measures/Assessment    Functional Gains: Walking, bending, sitting, stair negotiation, getting up 
hip abduction MMT: 4+/5. MET      3. Patient will report being able to carry her 30# grandchild with no increased reports of left knee pain in order to improve tolerance for taking care of them.  PN: Patient reports increased left knee pain and decreased confidence carrying her grandchild.   CURRENT: Patient able to carry 30# grandchild and 30# box with no increased reports of left knee pain. MET      4. Patient will return to performing recreational activities such as, riding bicycle and walking a mile, with no increased reports of left knee pain and to improve cardiovascular endurance.   PN: Patient has not returned to performing either activity.   CURRENT: Patient has returned to biking and walking long distances with no increased reports of left knee pain. MET      Functional Gains: Walking, bending, sitting, stair negotiation, getting up from seated position   Functional Deficits: Descending stairs in reciprocal pattern and kneeling  % improvement: 90% improvement   Pain   Average: 0/10                  Best: 0/10                Worst: 2-3/10  Patient Goal: \"To walk down the stairs and to get stronger.\"     Payor: R / Plan: [facility] EMPLOYEES / Product Type: *No Product type* /     Non-Medicare, can change goals, can adjust or add frequency duration, no signature required      New Goals to be achieved in __3__ WEEKS    1.Patient will negotiate 6\" stairs x 4 with reciprocal pattern, one HR and no increased reports of left knee pain in order to improve patients tolerance for stairs to perform household duties with ease.  PN: Patient able to negotiate 6'' stairs with no TROM brace, reciprocal pattern, 1 HR and reports of left knee pain on the lateral side.    2. Patient will report no left knee soreness or discomfort when kneeling on left knee to improve tolerance for performing household duties and gardening with ease.   PN: Patient reports left knee soreness when kneeling to make her bed.       Frequency /

## 2024-06-14 ENCOUNTER — HOSPITAL ENCOUNTER (OUTPATIENT)
Facility: HOSPITAL | Age: 54
Setting detail: RECURRING SERIES
Discharge: HOME | End: 2024-06-17
Payer: COMMERCIAL

## 2024-06-14 PROCEDURE — 97110 THERAPEUTIC EXERCISES: CPT

## 2024-06-14 PROCEDURE — 97530 THERAPEUTIC ACTIVITIES: CPT

## 2024-06-14 NOTE — PROGRESS NOTES
PHYSICAL / OCCUPATIONAL THERAPY - DAILY TREATMENT NOTE    Patient Name: Celine Ramirez    Date: 2024    : 1970  Insurance: Payor: [facility] / Plan: [employer] EMPLOYEES / Product Type: *No Product type* /      Patient  verified Yes     Visit #   Current / Total 1 9   Time   In / Out 331 pm  400 pm    Pain   In / Out -3/10  2-3/10   Subjective Functional Status/Changes: Patient reports having increased B knee soreness after last session.      TREATMENT AREA =  Pain in left knee [M25.562]     OBJECTIVE         Therapeutic Procedures:    Tx Min Billable or 1:1 Min (if diff from Tx Min) Procedure, Rationale, Specifics   10  12897 Therapeutic Exercise (timed):  increase ROM, strength, coordination, balance, and proprioception to improve patient's ability to progress to PLOF and address remaining functional goals. (see flow sheet as applicable)     Details if applicable:         04674 Therapeutic Activity (timed):  use of dynamic activities replicating functional movements to increase ROM, strength, coordination, balance, and proprioception in order to improve patient's ability to progress to PLOF and address remaining functional goals.  (see flow sheet as applicable)     Details if applicable:            Details if applicable:            Details if applicable:            Details if applicable:     29  [facility] Totals Reminder: bill using total billable min of TIMED therapeutic procedures (example: do not include dry needle or estim unattended, both untimed codes, in totals to left)  8-22 min = 1 unit; 23-37 min = 2 units; 38-52 min = 3 units; 53-67 min = 4 units; 68-82 min = 5 units   Total Total     [x]  Patient Education billed concurrently with other procedures   [x] Review HEP    [] Progressed/Changed HEP, detail:    [] Other detail:       Objective Information/Functional Measures/Assessment    Patient presents to today's session with increased reports of B knee soreness. Held exercises, as per flow

## 2024-06-17 ENCOUNTER — HOSPITAL ENCOUNTER (OUTPATIENT)
Facility: HOSPITAL | Age: 54
Setting detail: RECURRING SERIES
Discharge: HOME | End: 2024-06-20
Payer: COMMERCIAL

## 2024-06-17 PROCEDURE — 97110 THERAPEUTIC EXERCISES: CPT

## 2024-06-17 PROCEDURE — 97535 SELF CARE MNGMENT TRAINING: CPT

## 2024-06-17 PROCEDURE — 97112 NEUROMUSCULAR REEDUCATION: CPT

## 2024-06-17 PROCEDURE — 97530 THERAPEUTIC ACTIVITIES: CPT

## 2024-06-17 NOTE — PROGRESS NOTES
PHYSICAL / OCCUPATIONAL THERAPY - DAILY TREATMENT NOTE    Patient Name: Celine Ramirez    Date: 2024    : 1970  Insurance: Payor: [facility] / Plan: [employer] EMPLOYEES / Product Type: *No Product type* /      Patient  verified Yes     Visit #   Current / Total 2 9   Time   In / Out 330 414   Pain   In / Out 1 1   Subjective Functional Status/Changes: Reports whatever we did on Monday last week caused pain about 40 minutes afterward- and it remained into last Thursday, Friday it was still there but it was tolerable.   Patient questions increase pain to increase on SL Leg press to 25#.        TREATMENT AREA =  Pain in left knee [M25.562]     OBJECTIVE    PD CP encouraged to ice PRN for soreness      Therapeutic Procedures:    Tx Min Billable or 1:1 Min (if diff from Tx Min) Procedure, Rationale, Specifics    01757 Therapeutic Exercise (timed):  increase ROM, strength, coordination, balance, and proprioception to improve patient's ability to progress to PLOF and address remaining functional goals. (see flow sheet as applicable)     Details if applicable:       97112 Neuromuscular Re-Education (timed):  improve balance, coordination, kinesthetic sense, posture, core stability and proprioception to improve patient's ability to develop conscious control of individual muscles and awareness of position of extremities in order to progress to PLOF and address remaining functional goals. (see flow sheet as applicable)     Details if applicable:     97530 Therapeutic Activity (timed):  use of dynamic activities replicating functional movements to increase ROM, strength, coordination, balance, and proprioception in order to improve patient's ability to progress to PLOF and address remaining functional goals.  (see flow sheet as applicable)     Details if applicable:      35912 Self Care/Home Management (timed):  improve patient knowledge and understanding of pain reducing techniques,  none

## 2024-06-19 ENCOUNTER — HOSPITAL ENCOUNTER (OUTPATIENT)
Facility: HOSPITAL | Age: 54
Setting detail: RECURRING SERIES
Discharge: HOME | End: 2024-06-22
Payer: COMMERCIAL

## 2024-06-19 PROCEDURE — 97530 THERAPEUTIC ACTIVITIES: CPT

## 2024-06-19 PROCEDURE — 97110 THERAPEUTIC EXERCISES: CPT

## 2024-06-19 NOTE — PROGRESS NOTES
PHYSICAL / OCCUPATIONAL THERAPY - DAILY TREATMENT NOTE    Patient Name: Celine Ramirez    Date: 2024    : 1970  Insurance: Payor: [facility] / Plan: [facility] EMPLOYEES / Product Type: *No Product type* /      Patient  verified Yes     Visit #   Current / Total 3 9   Time   In / Out 409 pm  447 pm    Pain   In / Out 0/10  0/10    Subjective Functional Status/Changes: Patient explains that she was able to do some laundry and do the stairs more comfortably.      TREATMENT AREA =  Pain in left knee [M25.562]     OBJECTIVE         Therapeutic Procedures:    Tx Min Billable or 1:1 Min (if diff from Tx Min) Procedure, Rationale, Specifics   15  70861 Therapeutic Exercise (timed):  increase ROM, strength, coordination, balance, and proprioception to improve patient's ability to progress to PLOF and address remaining functional goals. (see flow sheet as applicable)     Details if applicable:       23  35369 Therapeutic Activity (timed):  use of dynamic activities replicating functional movements to increase ROM, strength, coordination, balance, and proprioception in order to improve patient's ability to progress to PLOF and address remaining functional goals.  (see flow sheet as applicable)     Details if applicable:            Details if applicable:            Details if applicable:            Details if applicable:     38  [facility] Totals Reminder: bill using total billable min of TIMED therapeutic procedures (example: do not include dry needle or estim unattended, both untimed codes, in totals to left)  8-22 min = 1 unit; 23-37 min = 2 units; 38-52 min = 3 units; 53-67 min = 4 units; 68-82 min = 5 units   Total Total     [x]  Patient Education billed concurrently with other procedures   [x] Review HEP    [] Progressed/Changed HEP, detail:    [] Other detail:       Objective Information/Functional Measures/Assessment    Patient presents to today's session with no reports of left knee pain. Patient performed

## 2024-06-21 ENCOUNTER — HOSPITAL ENCOUNTER (OUTPATIENT)
Facility: HOSPITAL | Age: 54
Setting detail: RECURRING SERIES
Discharge: HOME | End: 2024-06-24
Payer: COMMERCIAL

## 2024-06-21 PROCEDURE — 97530 THERAPEUTIC ACTIVITIES: CPT

## 2024-06-21 PROCEDURE — 97110 THERAPEUTIC EXERCISES: CPT

## 2024-06-21 NOTE — PROGRESS NOTES
PHYSICAL / OCCUPATIONAL THERAPY - DAILY TREATMENT NOTE    Patient Name: Celine Ramirez    Date: 2024    : 1970  Insurance: Payor: [facility] / Plan: [facility] EMPLOYEES / Product Type: *No Product type* /      Patient  verified Yes     Visit #   Current / Total 4 9   Time   In / Out 412 pm  445 pm   Pain   In / Out 0/10 0/10   Subjective Functional Status/Changes: Patient denies any pain today and reports that she did not have increased soreness after last session.      TREATMENT AREA =  Pain in left knee [M25.562]     OBJECTIVE         Therapeutic Procedures:    Tx Min Billable or 1:1 Min (if diff from Tx Min) Procedure, Rationale, Specifics   10  74728 Therapeutic Exercise (timed):  increase ROM, strength, coordination, balance, and proprioception to improve patient's ability to progress to PLOF and address remaining functional goals. (see flow sheet as applicable)     Details if applicable:       23  43563 Therapeutic Activity (timed):  use of dynamic activities replicating functional movements to increase ROM, strength, coordination, balance, and proprioception in order to improve patient's ability to progress to PLOF and address remaining functional goals.  (see flow sheet as applicable)     Details if applicable:            Details if applicable:            Details if applicable:            Details if applicable:     33  [facility] Totals Reminder: bill using total billable min of TIMED therapeutic procedures (example: do not include dry needle or estim unattended, both untimed codes, in totals to left)  8-22 min = 1 unit; 23-37 min = 2 units; 38-52 min = 3 units; 53-67 min = 4 units; 68-82 min = 5 units   Total Total     [x]  Patient Education billed concurrently with other procedures   [x] Review HEP    [] Progressed/Changed HEP, detail:    [] Other detail:       Objective Information/Functional Measures/Assessment    Patient presented to today's session with a good response to last session. Patient

## 2024-06-24 ENCOUNTER — HOSPITAL ENCOUNTER (OUTPATIENT)
Facility: HOSPITAL | Age: 54
Setting detail: RECURRING SERIES
Discharge: HOME | End: 2024-06-27
Payer: COMMERCIAL

## 2024-06-24 PROCEDURE — 97110 THERAPEUTIC EXERCISES: CPT

## 2024-06-24 PROCEDURE — 97535 SELF CARE MNGMENT TRAINING: CPT

## 2024-06-24 PROCEDURE — 97530 THERAPEUTIC ACTIVITIES: CPT

## 2024-06-24 PROCEDURE — 97112 NEUROMUSCULAR REEDUCATION: CPT

## 2024-06-24 NOTE — PROGRESS NOTES
PHYSICAL / OCCUPATIONAL THERAPY - DAILY TREATMENT NOTE    Patient Name: Celine Ramirez    Date: 2024    : 1970  Insurance: Payor: [facility] / Plan: [facility] EMPLOYEES / Product Type: *No Product type* /      Patient  verified Yes     Visit #   Current / Total 5 9   Time   In / Out 413 450   Pain   In / Out 0 0   Subjective Functional Status/Changes: I am doing alright today. I do the exercises for both legs at home. Was able to get in the pool and do some pool volleyball, swam, able to tolerate some jumping while in the pool.      TREATMENT AREA =  Pain in left knee [M25.562]     OBJECTIVE         Therapeutic Procedures:    Tx Min Billable or 1:1 Min (if diff from Tx Min) Procedure, Rationale, Specifics   10 10 15565 Therapeutic Exercise (timed):  increase ROM, strength, coordination, balance, and proprioception to improve patient's ability to progress to PLOF and address remaining functional goals. (see flow sheet as applicable)     Details if applicable:       10 10 12810 Therapeutic Activity (timed):  use of dynamic activities replicating functional movements to increase ROM, strength, coordination, balance, and proprioception in order to improve patient's ability to progress to PLOF and address remaining functional goals.  (see flow sheet as applicable)     Details if applicable:      33002 Neuromuscular Re-Education (timed):  improve balance, coordination, kinesthetic sense, posture, core stability and proprioception to improve patient's ability to develop conscious control of individual muscles and awareness of position of extremities in order to progress to PLOF and address remaining functional goals. (see flow sheet as applicable)     Details if applicable:      80287 Self Care/Home Management (timed):  improve patient knowledge and understanding of pain reducing techniques, positioning, posture/ergonomics, home safety, activity modification, diagnosis/prognosis, and physical therapy

## 2024-06-26 ENCOUNTER — HOSPITAL ENCOUNTER (OUTPATIENT)
Facility: HOSPITAL | Age: 54
Setting detail: RECURRING SERIES
Discharge: HOME | End: 2024-06-29
Payer: COMMERCIAL

## 2024-06-26 PROCEDURE — 97110 THERAPEUTIC EXERCISES: CPT

## 2024-06-26 PROCEDURE — 97530 THERAPEUTIC ACTIVITIES: CPT

## 2024-06-26 NOTE — PROGRESS NOTES
EMERGENCY DEPARTMENT HISTORY AND PHYSICAL EXAM      Date: 10/21/2021  Patient Name: Chaparrita Hanley    History of Presenting Illness     Chief Complaint   Patient presents with   [facility] Leg Pain     History Provided By: Patient    HPI: Chaparrita Hanley, 33 y.o. female with no chronic medical history who presents via self to the ED with cc of acute mild to moderate aching left-sided anterior hip pain X 2 days secondary to fall at work 2 days prior to arrival.  Patient states that she slipped on the dry ground that had dust on it and fell and awkward angle on her left side. Denies head injury or LOC. Denies any immediate pain, but woke up the following day with aching in her left hip. Endorses that she has been ambulatory without difficulty. No medications or alleviating factors. No fever, chills, nausea, vomiting, numbness, tingling, focal weakness, saddle paresthesias, gait abnormalities, incontinence, urinary retention, hematuria. Patient endorses that she needs a work note. PCP: None    There are no other complaints, changes, or physical findings at this time. No current facility-administered medications on file prior to encounter. Current Outpatient Medications on File Prior to Encounter   Medication Sig Dispense Refill    [DISCONTINUED] cyclobenzaprine (FLEXERIL) 10 mg tablet Take 1 Tablet by mouth three (3) times daily as needed for Muscle Spasm(s). (Patient not taking: Reported on 9/6/2021) 21 Tablet 0    [DISCONTINUED] methylPREDNISolone (MEDROL DOSEPACK) 4 mg tablet Use as directed (Patient not taking: Reported on 9/6/2021) 1 Dose Pack 0    [DISCONTINUED] fluticasone (FLONASE) 50 mcg/actuation nasal spray 2 Sprays by Both Nostrils route daily. (Patient not taking: Reported on 5/25/2021) 1 Bottle 0    [DISCONTINUED] PNV Cmb#21-Iron-Folic Acid (PRENATAL COMPLETE)  mg-mcg Tab Take 1 Tab by mouth daily.  (Patient not taking: Reported on 5/25/2021) 30 Tab 1     Past History     Past PHYSICAL / OCCUPATIONAL THERAPY - DAILY TREATMENT NOTE    Patient Name: Celine Ramirez    Date: 2024    : 1970  Insurance: Payor: [facility] / Plan: [facility] EMPLOYEES / Product Type: *No Product type* /      Patient  verified Yes     Visit #   Current / Total 6 9   Time   In / Out 410 pm  443 pm    Pain   In / Out 0/10  0/10    Subjective Functional Status/Changes: \"I'm feeling good.\"      TREATMENT AREA =  Pain in left knee [M25.562]     OBJECTIVE         Therapeutic Procedures:    Tx Min Billable or 1:1 Min (if diff from Tx Min) Procedure, Rationale, Specifics   10  60473 Therapeutic Exercise (timed):  increase ROM, strength, coordination, balance, and proprioception to improve patient's ability to progress to PLOF and address remaining functional goals. (see flow sheet as applicable)     Details if applicable:         23770 Therapeutic Activity (timed):  use of dynamic activities replicating functional movements to increase ROM, strength, coordination, balance, and proprioception in order to improve patient's ability to progress to PLOF and address remaining functional goals.  (see flow sheet as applicable)     Details if applicable:            Details if applicable:            Details if applicable:            Details if applicable:     33  [facility] Totals Reminder: bill using total billable min of TIMED therapeutic procedures (example: do not include dry needle or estim unattended, both untimed codes, in totals to left)  8-22 min = 1 unit; 23-37 min = 2 units; 38-52 min = 3 units; 53-67 min = 4 units; 68-82 min = 5 units   Total Total     [x]  Patient Education billed concurrently with other procedures   [x] Review HEP    [] Progressed/Changed HEP, detail:    [] Other detail:       Objective Information/Functional Measures/Assessment    Patient is progressing well and nearing discharge. Patient performed exercises, as per flow sheet, to further assist with increasing left quad strength for  Medical History:  History reviewed. No pertinent past medical history. Past Surgical History:  No past surgical history on file. Family History:  History reviewed. No pertinent family history. Social History:  Social History     Tobacco Use    Smoking status: Never Smoker    Smokeless tobacco: Never Used   Substance Use Topics    Alcohol use: Yes     Comment: social    Drug use: Not Currently     Types: Marijuana     Allergies:  No Known Allergies  Review of Systems   Review of Systems   Constitutional: Negative for activity change, appetite change, chills, diaphoresis, fatigue and fever. HENT: Negative. Eyes: Negative. Negative for visual disturbance. Respiratory: Negative. Negative for cough and shortness of breath. Cardiovascular: Negative. Negative for chest pain and leg swelling. Gastrointestinal: Negative. Negative for abdominal pain, diarrhea, nausea and vomiting. Genitourinary: Negative. Musculoskeletal: Positive for arthralgias. Negative for back pain, gait problem, joint swelling, myalgias, neck pain and neck stiffness. Skin: Negative. Negative for color change, pallor and wound. Neurological: Negative. Negative for weakness and numbness. Psychiatric/Behavioral: Negative. Negative for confusion. Physical Exam   Physical Exam  Vitals and nursing note reviewed. Constitutional:       General: She is not in acute distress. Appearance: Normal appearance. She is well-developed. She is not ill-appearing, toxic-appearing or diaphoretic. HENT:      Head: Normocephalic and atraumatic. Right Ear: Hearing and external ear normal.      Left Ear: Hearing and external ear normal.      Nose: Nose normal.   Eyes:      Conjunctiva/sclera: Conjunctivae normal.      Pupils: Pupils are equal, round, and reactive to light. Cardiovascular:      Rate and Rhythm: Normal rate and regular rhythm. Pulses: Normal pulses.            Posterior tibial pulses are 2+ on the right side and 2+ on the left side. Heart sounds: Normal heart sounds. Pulmonary:      Effort: Pulmonary effort is normal. No respiratory distress. Musculoskeletal:         General: Normal range of motion. Cervical back: Normal range of motion. Lumbar back: Normal.      Right hip: Normal.      Left hip: No deformity, lacerations, tenderness, bony tenderness or crepitus. Normal range of motion. Normal strength. Right upper leg: Normal.      Left upper leg: Normal.      Right knee: Normal.      Left knee: Normal.   Skin:     General: Skin is warm and dry. Neurological:      General: No focal deficit present. Mental Status: She is alert and oriented to person, place, and time. Motor: Motor function is intact. No weakness, atrophy or seizure activity. Coordination: Coordination is intact. Gait: Gait is intact. Psychiatric:         Behavior: Behavior normal.         Thought Content: Thought content normal.         Judgment: Judgment normal.       Diagnostic Study Results   Labs -   No results found for this or any previous visit (from the past 12 hour(s)). Radiologic Studies -   No orders to display     No results found. Medical Decision Making   I am the first provider for this patient. I reviewed the vital signs, available nursing notes, past medical history, past surgical history, family history and social history. Vital Signs-Reviewed the patient's vital signs. Patient Vitals for the past 24 hrs:   Temp Pulse Resp BP SpO2   10/21/21 1124 98.2 °F (36.8 °C) 87 19 (!) 113/52 98 %     Pulse Oximetry Analysis - 98% on RA (normal)    Records Reviewed: Nursing Notes, Old Medical Records, Previous Radiology Studies and Previous Laboratory Studies    Provider Notes (Medical Decision Making):   62-year-old female presents with left hip pain secondary to fall 2 days prior to arrival.  No signs of fracture or dislocation on exam patient is ambulating well.   Requesting a work note.  Suspect strain or sprain based on hx and exam. Will tx with analgesics and provide work note. ED Course:   Initial assessment performed. The patients presenting problems have been discussed, and they are in agreement with the care plan formulated and outlined with them. I have encouraged them to ask questions as they arise throughout their visit. Progress Note:   Updated pt on all returned results and findings. Discussed the importance of proper follow up as referred below along with return precautions. Pt in agreement with the care plan and expresses agreement with and understanding of all items discussed. Disposition:  12:18 PM  I have discussed with patient their diagnosis, treatment, and follow up plan. The patient agrees to follow up as outlined in discharge paperwork and also to return to the ED with any worsening. Alejandrina Barron PA-C      PLAN:  1. Current Discharge Medication List      START taking these medications    Details   acetaminophen (TYLENOL) 500 mg tablet Take 2 Tablets by mouth every six (6) hours as needed for Pain. Qty: 20 Tablet, Refills: 0  Start date: 10/21/2021      ibuprofen (MOTRIN) 600 mg tablet Take 1 Tablet by mouth every six (6) hours as needed for Pain.   Qty: 20 Tablet, Refills: 0  Start date: 10/21/2021         STOP taking these medications       cyclobenzaprine (FLEXERIL) 10 mg tablet Comments:   Reason for Stopping:         methylPREDNISolone (MEDROL DOSEPACK) 4 mg tablet Comments:   Reason for Stopping:         fluticasone (FLONASE) 50 mcg/actuation nasal spray Comments:   Reason for Stopping:         PNV Cmb#21-Iron-Folic Acid (PRENATAL COMPLETE)  mg-mcg Tab Comments:   Reason for Stoppin.   Follow-up Information     Follow up With Specialties Details Why [address] [facility] EMERGENCY DEPT Emergency Medicine Go to  As needed, If symptoms worsen Wiliam Greene    [facility]  Schedule an appointment as soon as possible for a visit  As needed [address] [address]        Return to ED if worse     Diagnosis     Clinical Impression:   1. Strain of left hip, initial encounter    2. Fall, initial encounter    3. Encounter to obtain excuse from work            Please note that this dictation was completed with Dragon, computer voice recognition software. Quite often unanticipated grammatical, syntax, homophones, and other interpretive errors are inadvertently transcribed by the computer software. Please disregard these errors. Additionally, please excuse any errors that have escaped final proofreading.

## 2024-06-28 ENCOUNTER — HOSPITAL ENCOUNTER (OUTPATIENT)
Facility: HOSPITAL | Age: 54
Setting detail: RECURRING SERIES
Discharge: HOME | End: 2024-07-01
Payer: COMMERCIAL

## 2024-06-28 PROCEDURE — 97530 THERAPEUTIC ACTIVITIES: CPT

## 2024-06-28 PROCEDURE — 97110 THERAPEUTIC EXERCISES: CPT

## 2024-06-28 NOTE — PROGRESS NOTES
Physical Therapy Discharge Instructions      [facility] - [address], [address]  [place] [address] [phone] fax    Patient: Celine Ramirez  : 1970      Continue Home Exercise Program 1 times per day for 3 weeks, then decrease to 3 times per week      Continue with    [x] Ice  as needed 1-2 times per day     [x] Heat           Follow up with MD:     [] Upon completion of therapy     [x] As needed      Recommendations:     [x]   Return to activity with home program    []   Return to activity with the following modifications:       []Post Rehab Program    []Join Independent aquatic program     []Return to/join local gym      Additional Comments: Please call office with any questions or concerns.      Marcellus Hernandez, [place] 2024 9:19 AM

## 2024-06-28 NOTE — THERAPY DISCHARGE
or estim unattended, both untimed codes, in totals to left)  8-22 min = 1 unit; 23-37 min = 2 units; 38-52 min = 3 units; 53-67 min = 4 units; 68-82 min = 5 units   Total Total       [x]  Patient Education billed concurrently with other procedures   [x] Review HEP    [] Progressed/Changed HEP, detail:    [] Other detail:       Objective Information/Functional Measures/Assessment    Patient has attended 29 total PT sessions and has progressed well towards PT goals. Patient has returned to performing all household duties, performing yard work and swimming with no increase in left knee pain. Patient also noted improvements with stair negotiation and walking long distances. Updated HEP and issued to patient. Patient verbalized understanding and to continue it on her own at this time. Patient discharged today.     GOALS    1.Patient will negotiate 6\" stairs x 4 with reciprocal pattern, one HR and no increased reports of left knee pain in order to improve patients tolerance for stairs to perform household duties with ease.  PN: Patient able to negotiate 6'' stairs with no TROM brace, reciprocal pattern, 1 HR and reports of left knee pain on the lateral side.  Current: Patient negotiated 8'' stairs x 5 with reciprocal pattern with intermittent use of HR and no reports of left knee pain. Partially MET      2. Patient will report no left knee soreness or discomfort when kneeling on left knee to improve tolerance for performing household duties and gardening with ease.   PN: Patient reports left knee soreness when kneeling to make her bed.   Current: Patient reports being able to kneel on different surfaces (bed, grass, pull out couch) with no increased reports of left knee pain. MET    RECOMMENDATIONS  Discontinue therapy. Progressing towards or have reached established goals.    Marcellus Hernandez, PTA       6/28/2024       9:20 AM    Payor: [facility] / Plan: [facility] EMPLOYEES / Product Type: *No Product type* /      Not Medicaid

## 2024-07-19 RX ORDER — DOXYCYCLINE 100 MG/1
100 CAPSULE ORAL 2 TIMES DAILY
Qty: 180 CAPSULE | Refills: 0 | Status: SHIPPED | OUTPATIENT
Start: 2024-07-19

## 2024-07-19 NOTE — TELEPHONE ENCOUNTER
Please have her scheduled for a 40 min in office apt with me in August or September with fasting labs scheduled 1 wk before her apt.      Dr. Narcisa León  [facility] [address], [facility] [address]  [place] [address]  Phone: [phone]  Fax: [phone]

## 2024-07-29 RX ORDER — DOXYCYCLINE 100 MG/1
100 CAPSULE ORAL 2 TIMES DAILY
Qty: 180 CAPSULE | Refills: 0 | Status: SHIPPED | OUTPATIENT
Start: 2024-07-29

## 2024-07-29 NOTE — TELEPHONE ENCOUNTER
Please have her schedule for a follow-up 40-minute visit with me in the office.  She needs labs schedule I week before her appointment.    Dr. Naricsa León  [facility] [address], [address]  [place] [address]  Phone: [phone]  Fax: [phone]

## 2024-09-16 DIAGNOSIS — E03.9 ACQUIRED HYPOTHYROIDISM: Primary | ICD-10-CM

## 2024-09-16 DIAGNOSIS — K76.0 NAFLD (NONALCOHOLIC FATTY LIVER DISEASE): ICD-10-CM

## 2024-09-17 RX ORDER — LEVOTHYROXINE SODIUM 125 UG/1
125 TABLET ORAL
Qty: 90 TABLET | Refills: 1 | OUTPATIENT
Start: 2024-09-17

## 2024-10-01 RX ORDER — LEVOTHYROXINE SODIUM 125 UG/1
125 TABLET ORAL DAILY
Qty: 90 TABLET | Refills: 0 | Status: SHIPPED | OUTPATIENT
Start: 2024-10-01

## 2024-10-01 NOTE — TELEPHONE ENCOUNTER
Please have her scheduled for an in office exam and labs scheduled 1 wk before for any additional refills. Please let her know that her thyroid function has not been checked in >1 yr per our EHR. No additional refills will be provided after today without an in office apt and labs.      Dr. Narcisa León  [facility] [address], [address]  [place] [address]  Phone: [phone]  Fax: [phone]

## 2024-10-16 RX ORDER — SIMVASTATIN 20 MG
20 TABLET ORAL
Qty: 90 TABLET | Refills: 1 | OUTPATIENT
Start: 2024-10-16

## 2024-10-16 RX ORDER — DOXYCYCLINE 100 MG/1
100 CAPSULE ORAL 2 TIMES DAILY
Qty: 180 CAPSULE | Refills: 0 | OUTPATIENT
Start: 2024-10-16

## 2024-10-29 ENCOUNTER — PATIENT MESSAGE (OUTPATIENT)
Facility: CLINIC | Age: 54
End: 2024-10-29

## 2024-10-29 RX ORDER — DOXYCYCLINE 100 MG/1
100 CAPSULE ORAL 2 TIMES DAILY
Qty: 180 CAPSULE | Refills: 0 | OUTPATIENT
Start: 2024-10-29

## 2024-10-29 NOTE — TELEPHONE ENCOUNTER
Dr. León refused this medication 1 week ago since patient is overdue for an appointment and needs to schedule.  Please schedule

## 2024-11-05 ENCOUNTER — HOSPITAL ENCOUNTER (OUTPATIENT)
Facility: HOSPITAL | Age: 54
Discharge: HOME | End: 2024-11-08
Attending: INTERNAL MEDICINE
Payer: COMMERCIAL

## 2024-11-05 VITALS — HEIGHT: 66 IN | WEIGHT: 160 LBS | BODY MASS INDEX: 25.71 KG/M2

## 2024-11-05 DIAGNOSIS — Z12.31 VISIT FOR SCREENING MAMMOGRAM: ICD-10-CM

## 2024-11-05 PROCEDURE — 77063 BREAST TOMOSYNTHESIS BI: CPT

## 2024-11-07 ENCOUNTER — PATIENT MESSAGE (OUTPATIENT)
Facility: CLINIC | Age: 54
End: 2024-11-07

## 2024-11-07 RX ORDER — SIMVASTATIN 20 MG
20 TABLET ORAL NIGHTLY
Qty: 90 TABLET | Refills: 0 | Status: SHIPPED | OUTPATIENT
Start: 2024-11-07

## 2024-11-07 NOTE — TELEPHONE ENCOUNTER
Last OV: 8/24/2024  No future appointment scheduled.   Last refill: 4/22/2024    [facility]

## 2024-11-12 ENCOUNTER — OFFICE VISIT (OUTPATIENT)
Facility: CLINIC | Age: 54
End: 2024-11-12

## 2024-11-12 VITALS
RESPIRATION RATE: 16 BRPM | BODY MASS INDEX: 26.36 KG/M2 | DIASTOLIC BLOOD PRESSURE: 88 MMHG | HEART RATE: 76 BPM | TEMPERATURE: 98.1 F | WEIGHT: 164 LBS | SYSTOLIC BLOOD PRESSURE: 147 MMHG | HEIGHT: 66 IN | OXYGEN SATURATION: 98 %

## 2024-11-12 DIAGNOSIS — E55.9 VITAMIN D DEFICIENCY: ICD-10-CM

## 2024-11-12 DIAGNOSIS — I10 HYPERTENSION, UNSPECIFIED TYPE: Primary | ICD-10-CM

## 2024-11-12 DIAGNOSIS — L98.9 SKIN LESION: ICD-10-CM

## 2024-11-12 DIAGNOSIS — E78.5 HYPERLIPIDEMIA, UNSPECIFIED HYPERLIPIDEMIA TYPE: ICD-10-CM

## 2024-11-12 DIAGNOSIS — Z23 IMMUNIZATION DUE: ICD-10-CM

## 2024-11-12 DIAGNOSIS — Z12.11 ENCOUNTER FOR SCREENING FOR MALIGNANT NEOPLASM OF COLON: ICD-10-CM

## 2024-11-12 DIAGNOSIS — K76.0 NAFLD (NONALCOHOLIC FATTY LIVER DISEASE): ICD-10-CM

## 2024-11-12 DIAGNOSIS — E03.9 ACQUIRED HYPOTHYROIDISM: ICD-10-CM

## 2024-11-12 DIAGNOSIS — Z87.81 HISTORY OF FRACTURE: ICD-10-CM

## 2024-11-12 DIAGNOSIS — R73.9 HYPERGLYCEMIA: ICD-10-CM

## 2024-11-12 DIAGNOSIS — Z23 NEED FOR IMMUNIZATION AGAINST INFLUENZA: ICD-10-CM

## 2024-11-12 RX ORDER — LISINOPRIL 10 MG/1
10 TABLET ORAL DAILY
Qty: 90 TABLET | Refills: 1 | Status: SHIPPED | OUTPATIENT
Start: 2024-11-12 | End: 2024-12-10 | Stop reason: ALTCHOICE

## 2024-11-12 SDOH — ECONOMIC STABILITY: FOOD INSECURITY: WITHIN THE PAST 12 MONTHS, THE FOOD YOU BOUGHT JUST DIDN'T LAST AND YOU DIDN'T HAVE MONEY TO GET MORE.: NEVER TRUE

## 2024-11-12 SDOH — ECONOMIC STABILITY: INCOME INSECURITY: HOW HARD IS IT FOR YOU TO PAY FOR THE VERY BASICS LIKE FOOD, HOUSING, MEDICAL CARE, AND HEATING?: NOT HARD AT ALL

## 2024-11-12 SDOH — ECONOMIC STABILITY: FOOD INSECURITY: WITHIN THE PAST 12 MONTHS, YOU WORRIED THAT YOUR FOOD WOULD RUN OUT BEFORE YOU GOT MONEY TO BUY MORE.: NEVER TRUE

## 2024-11-12 ASSESSMENT — PATIENT HEALTH QUESTIONNAIRE - PHQ9
SUM OF ALL RESPONSES TO PHQ QUESTIONS 1-9: 0
SUM OF ALL RESPONSES TO PHQ9 QUESTIONS 1 & 2: 0
1. LITTLE INTEREST OR PLEASURE IN DOING THINGS: NOT AT ALL
2. FEELING DOWN, DEPRESSED OR HOPELESS: NOT AT ALL

## 2024-11-12 NOTE — PROGRESS NOTES
Celine Ramirez is a 54 y.o. female (: 1970) presenting to address:    Chief Complaint   Patient presents with    Medication Refill    Follow-up       Vitals:    24 1453   BP: (!) 147/88   Pulse:    Resp:    Temp:    SpO2:        \"Have you been to the ER, urgent care clinic since your last visit?  Hospitalized since your last visit?\"    NO    “Have you seen or consulted any other health care providers outside of [facility] since your last visit?”    NO    “Have you had a colorectal cancer screening such as a colonoscopy/FIT/Cologuard?    NO    No colonoscopy on file  No cologuard on file  No FIT/FOBT on file   No flexible sigmoidoscopy on file        Immunizations Administered       Name Date Dose Route    Influenza, FLUCELVAX, (age 6 mo+) IM, Trivalent PF, 0.5mL 2024 0.5 mL Intramuscular    Site: Deltoid- Left    Lot: [id number]    NDC: 74257-124-45                  
02/28/2018    Hyperlipidemia 02/28/2018    Mitral valve prolapse 02/28/2018    Essential hypertension 02/28/2018       Current Outpatient Medications   Medication Sig Dispense Refill    simvastatin (ZOCOR) 20 MG tablet Take 1 tablet by mouth nightly 90 tablet 0    levothyroxine (SYNTHROID) 125 MCG tablet Take 1 tablet by mouth Daily 90 tablet 0    doxycycline monohydrate (MONODOX) 100 MG capsule Take 1 capsule by mouth 2 times daily 180 capsule 0    Multiple Vitamin (MULTIVITAMIN ADULT PO) Take 1 tablet by mouth daily.      Cholecalciferol 1.25 MG (02992 UT) TABS Take 1.25 mg by mouth daily      acetaminophen (TYLENOL) 500 MG tablet Take 2 tablets by mouth 4 times daily as needed for Pain 360 tablet 1    lisinopril (PRINIVIL;ZESTRIL) 20 MG tablet Take 1 tablet by mouth daily 90 tablet 1     No current facility-administered medications for this visit.       No Known Allergies      Past Medical History:   Diagnosis Date    Contact dermatitis and eczema due to cause 1996    Hydradenitis    GERD (gastroesophageal reflux disease)     Hypercholesterolemia 2016    Hypertension 10/2017    IFG (impaired fasting glucose)     Mitral valve prolapse     NAFLD (nonalcoholic fatty liver disease)     Other ill-defined conditions(799.89)     mitral valve prolapse    Thyroid disease     hypo       Past Surgical History:   Procedure Laterality Date    GYN      ov cyst    GYN      d & c's    HYSTERECTOMY (CERVIX STATUS UNKNOWN)      OTHER SURGICAL HISTORY      PATELLA FRACTURE SURGERY Left 1/26/2024    LEFT PATELLA OPEN REDUCTION INTERNAL FIXATION; C-ARM; [[facility]]; NERVE BLOCK performed by Jacob Jackson MD at [facility] MAIN OR       Family History   Problem Relation Age of Onset    Lung Disease Mother         COPD    Hypertension Mother     Thyroid Disease Mother     Elevated Lipids Mother     Skin Cancer Mother     Elevated Lipids Father     Cancer Father         Skin    Diabetes Father     Hypertension Father

## 2024-11-22 ENCOUNTER — PATIENT MESSAGE (OUTPATIENT)
Facility: CLINIC | Age: 54
End: 2024-11-22

## 2024-12-06 ENCOUNTER — PATIENT MESSAGE (OUTPATIENT)
Facility: CLINIC | Age: 54
End: 2024-12-06

## 2024-12-10 RX ORDER — LISINOPRIL 20 MG/1
20 TABLET ORAL DAILY
Qty: 90 TABLET | Refills: 1 | Status: SHIPPED | OUTPATIENT
Start: 2024-12-10

## 2024-12-11 ASSESSMENT — ENCOUNTER SYMPTOMS
ANAL BLEEDING: 0
EYE PAIN: 0
SORE THROAT: 0
ABDOMINAL PAIN: 0
SHORTNESS OF BREATH: 0
COUGH: 0
BLOOD IN STOOL: 0

## 2024-12-29 RX ORDER — LEVOTHYROXINE SODIUM 125 UG/1
125 TABLET ORAL DAILY
Qty: 90 TABLET | Refills: 0 | OUTPATIENT
Start: 2024-12-29

## 2024-12-31 RX ORDER — LEVOTHYROXINE SODIUM 125 UG/1
125 TABLET ORAL DAILY
Qty: 90 TABLET | Refills: 0 | OUTPATIENT
Start: 2024-12-31

## 2024-12-31 NOTE — TELEPHONE ENCOUNTER
No refills will be given until I've reviewed her results. Awaiting for labs to be drawn and will review and prescribe her appropriate dose after that.      Dr. Narcisa León  [facility] [address], [facility] [address]  [place] [address]  Phone: [phone]  Fax: [phone]

## 2024-12-31 NOTE — TELEPHONE ENCOUNTER
The patient called for a refill on levothyroxine.   She said her labs aren't due until 09/2025. Do we need to move her labs up sooner?      [facility] - [place] - [address], [address] - P [phone] - F [phone]  [address], [address], [place] [address]  Phone: [phone]  Fax: [phone]

## 2025-01-03 RX ORDER — LEVOTHYROXINE SODIUM 125 UG/1
125 TABLET ORAL DAILY
Qty: 90 TABLET | Refills: 0 | OUTPATIENT
Start: 2025-01-03

## 2025-01-03 NOTE — TELEPHONE ENCOUNTER
No refills will be given until she's had her TFTs checked and I've reviewed her results. Awaiting for labs to be drawn and will review her results and prescribe her appropriate dose of synthroid after that.        Dr. Narcisa León  [facility] [address], [address]  [place] [address]  Phone: [phone]  Fax: [phone]

## 2025-01-07 RX ORDER — LEVOTHYROXINE SODIUM 125 UG/1
125 TABLET ORAL DAILY
Qty: 90 TABLET | Refills: 0 | OUTPATIENT
Start: 2025-01-07

## 2025-01-07 NOTE — TELEPHONE ENCOUNTER
Spoke to pt to reiterate provider msg who stated she didn't do her f/u last year due to fractured knee. She stated that  staff stated that labs were put in for next year vs the current timeframe. She was then called by PSR, Pam who stated she was told to go [facility] for labs. She is unable to get labs done due to work schedule. She was told yesterday that someone was going to try to get her a lab appt for Thur/Fri and check the MyChart for that appt but no appt has been sent. Pt stated confusion to get labs done and need an appt.Scheduled pt for labs for 01/10 @ 8:45.     Pt is aware of medication hold due to labs needed.      BRODY Parkinson LPN

## 2025-01-07 NOTE — TELEPHONE ENCOUNTER
Once again, I do not see any lab results for the past year for her.  Has she had labs since her last visit?  If not, no additional refills will be given without updated lab results. If she has had labs drawn since her last visit with me, please give me a copy of her recent lab results so that I can review them and determine whether or not her Synthroid dose of 125 mcg daily is appropriate, prior to refilling it.    Dr. Narcisa León  [facility] [address], [address]  [place] [address]  Phone: [phone]  Fax: [phone]

## 2025-01-11 LAB
ALBUMIN SERPL-MCNC: 4.4 G/DL (ref 3.8–4.9)
ALP SERPL-CCNC: 76 IU/L (ref 44–121)
ALT SERPL-CCNC: 43 IU/L (ref 0–32)
AST SERPL-CCNC: 24 IU/L (ref 0–40)
BASOPHILS # BLD AUTO: 0.1 X10E3/UL (ref 0–0.2)
BASOPHILS NFR BLD AUTO: 1 %
BILIRUB SERPL-MCNC: 0.4 MG/DL (ref 0–1.2)
BUN SERPL-MCNC: 7 MG/DL (ref 6–24)
BUN/CREAT SERPL: 9 (ref 9–23)
CALCIUM SERPL-MCNC: 9.2 MG/DL (ref 8.7–10.2)
CHLORIDE SERPL-SCNC: 104 MMOL/L (ref 96–106)
CHOLEST SERPL-MCNC: 170 MG/DL (ref 100–199)
CO2 SERPL-SCNC: 23 MMOL/L (ref 20–29)
CREAT SERPL-MCNC: 0.74 MG/DL (ref 0.57–1)
EGFRCR SERPLBLD CKD-EPI 2021: 97 ML/MIN/1.73
EOSINOPHIL # BLD AUTO: 0.1 X10E3/UL (ref 0–0.4)
EOSINOPHIL NFR BLD AUTO: 2 %
ERYTHROCYTE [DISTWIDTH] IN BLOOD BY AUTOMATED COUNT: 11.2 % (ref 11.7–15.4)
GLOBULIN SER CALC-MCNC: 1.8 G/DL (ref 1.5–4.5)
GLUCOSE SERPL-MCNC: 108 MG/DL (ref 70–99)
HBA1C MFR BLD: 5.5 % (ref 4.8–5.6)
HCT VFR BLD AUTO: 45.5 % (ref 34–46.6)
HDLC SERPL-MCNC: 40 MG/DL
HGB BLD-MCNC: 15.4 G/DL (ref 11.1–15.9)
IMM GRANULOCYTES # BLD AUTO: 0 X10E3/UL (ref 0–0.1)
IMM GRANULOCYTES NFR BLD AUTO: 0 %
LDLC SERPL CALC-MCNC: 114 MG/DL (ref 0–99)
LYMPHOCYTES # BLD AUTO: 1.5 X10E3/UL (ref 0.7–3.1)
LYMPHOCYTES NFR BLD AUTO: 36 %
MCH RBC QN AUTO: 33.6 PG (ref 26.6–33)
MCHC RBC AUTO-ENTMCNC: 33.8 G/DL (ref 31.5–35.7)
MCV RBC AUTO: 99 FL (ref 79–97)
MONOCYTES # BLD AUTO: 0.5 X10E3/UL (ref 0.1–0.9)
MONOCYTES NFR BLD AUTO: 12 %
NEUTROPHILS # BLD AUTO: 2 X10E3/UL (ref 1.4–7)
NEUTROPHILS NFR BLD AUTO: 49 %
PLATELET # BLD AUTO: 277 X10E3/UL (ref 150–450)
POTASSIUM SERPL-SCNC: 4.8 MMOL/L (ref 3.5–5.2)
PROT SERPL-MCNC: 6.2 G/DL (ref 6–8.5)
RBC # BLD AUTO: 4.58 X10E6/UL (ref 3.77–5.28)
SODIUM SERPL-SCNC: 141 MMOL/L (ref 134–144)
SPECIMEN STATUS REPORT: NORMAL
T4 FREE SERPL-MCNC: 0.97 NG/DL (ref 0.82–1.77)
TRIGL SERPL-MCNC: 85 MG/DL (ref 0–149)
TSH SERPL DL<=0.005 MIU/L-ACNC: 6.91 UIU/ML (ref 0.45–4.5)
VLDLC SERPL CALC-MCNC: 16 MG/DL (ref 5–40)
WBC # BLD AUTO: 4.2 X10E3/UL (ref 3.4–10.8)

## 2025-01-12 DIAGNOSIS — E03.9 ACQUIRED HYPOTHYROIDISM: Primary | ICD-10-CM

## 2025-01-12 LAB
25(OH)D3+25(OH)D2 SERPL-MCNC: 64.1 NG/ML (ref 30–100)
ALBUMIN/CREAT UR: 4 MG/G CREAT (ref 0–29)
CREAT UR-MCNC: 101.4 MG/DL
MICROALBUMIN UR-MCNC: 3.9 UG/ML

## 2025-01-12 RX ORDER — LEVOTHYROXINE SODIUM 137 UG/1
137 TABLET ORAL DAILY
Qty: 30 TABLET | Refills: 5 | Status: SHIPPED | OUTPATIENT
Start: 2025-01-12

## 2025-01-23 DIAGNOSIS — E03.9 ACQUIRED HYPOTHYROIDISM: ICD-10-CM

## 2025-01-23 RX ORDER — LEVOTHYROXINE SODIUM 137 UG/1
137 TABLET ORAL DAILY
Qty: 30 TABLET | Refills: 5 | Status: SHIPPED | OUTPATIENT
Start: 2025-01-23

## 2025-01-23 RX ORDER — LISINOPRIL 20 MG/1
20 TABLET ORAL DAILY
Qty: 90 TABLET | Refills: 1 | Status: SHIPPED | OUTPATIENT
Start: 2025-01-23

## 2025-02-03 RX ORDER — SIMVASTATIN 20 MG
20 TABLET ORAL NIGHTLY
Qty: 90 TABLET | Refills: 1 | Status: SHIPPED | OUTPATIENT
Start: 2025-02-03

## 2025-02-03 NOTE — TELEPHONE ENCOUNTER
PCP: Narcisa León MD    LAST OFFICE VISIT:11/12/2024     LAST REFILL PER CHART:  Medication:simvastatin (ZOCOR) 20 MG tablet   Ordered On:11/07/2024  Instructions:Take 1 tablet by mouth nightly   Dispense:90 tablets  Refills:0      Future Appointments   Date Time Provider Department Center   9/2/2025  9:00 AM IOC LAB VISIT [facility]   9/9/2025  2:40 PM Narcisa León MD [facility] DE[facility]

## 2025-02-18 RX ORDER — DOXYCYCLINE 100 MG/1
100 CAPSULE ORAL 2 TIMES DAILY
Qty: 180 CAPSULE | Refills: 1 | Status: SHIPPED | OUTPATIENT
Start: 2025-02-18

## 2025-03-27 DIAGNOSIS — F41.9 ANXIETY: Primary | ICD-10-CM

## 2025-03-27 RX ORDER — PAROXETINE 20 MG/1
20 TABLET, FILM COATED ORAL DAILY
Qty: 90 TABLET | Refills: 1 | Status: SHIPPED | OUTPATIENT
Start: 2025-03-27

## 2025-03-27 NOTE — PROGRESS NOTES
Please schedule her for a follow-up visit with me in 6 weeks.    Dr. Narcisa León  [facility] [address], [address]  [place] [address]  Phone: [phone]  Fax: [phone]

## 2025-03-31 ENCOUNTER — APPOINTMENT (OUTPATIENT)
Facility: HOSPITAL | Age: 55
End: 2025-03-31
Attending: EMERGENCY MEDICINE
Payer: COMMERCIAL

## 2025-03-31 ENCOUNTER — APPOINTMENT (OUTPATIENT)
Facility: HOSPITAL | Age: 55
End: 2025-03-31
Payer: COMMERCIAL

## 2025-03-31 ENCOUNTER — HOSPITAL ENCOUNTER (EMERGENCY)
Facility: HOSPITAL | Age: 55
Discharge: HOME | End: 2025-03-31
Attending: EMERGENCY MEDICINE
Payer: COMMERCIAL

## 2025-03-31 VITALS
HEIGHT: 66 IN | DIASTOLIC BLOOD PRESSURE: 65 MMHG | SYSTOLIC BLOOD PRESSURE: 105 MMHG | TEMPERATURE: 98.6 F | WEIGHT: 160 LBS | BODY MASS INDEX: 25.71 KG/M2 | HEART RATE: 77 BPM | OXYGEN SATURATION: 97 % | RESPIRATION RATE: 25 BRPM

## 2025-03-31 DIAGNOSIS — R07.9 CHEST PAIN, UNSPECIFIED TYPE: Primary | ICD-10-CM

## 2025-03-31 DIAGNOSIS — M54.9 UPPER BACK PAIN: ICD-10-CM

## 2025-03-31 LAB
ALBUMIN SERPL-MCNC: 4.4 G/DL (ref 3.4–5)
ALBUMIN/GLOB SERPL: 1.4 (ref 0.8–1.7)
ALP SERPL-CCNC: 85 U/L (ref 45–117)
ALT SERPL-CCNC: 71 U/L (ref 13–56)
ANION GAP SERPL CALC-SCNC: 7 MMOL/L (ref 3–18)
AST SERPL-CCNC: 39 U/L (ref 10–38)
BASOPHILS # BLD: 0.04 K/UL (ref 0–0.1)
BASOPHILS NFR BLD: 0.8 % (ref 0–2)
BILIRUB SERPL-MCNC: 0.6 MG/DL (ref 0.2–1)
BUN SERPL-MCNC: 8 MG/DL (ref 7–18)
BUN/CREAT SERPL: 14 (ref 12–20)
CALCIUM SERPL-MCNC: 9.2 MG/DL (ref 8.5–10.1)
CHLORIDE SERPL-SCNC: 99 MMOL/L (ref 100–111)
CO2 SERPL-SCNC: 26 MMOL/L (ref 21–32)
CREAT SERPL-MCNC: 0.59 MG/DL (ref 0.6–1.3)
D DIMER PPP FEU-MCNC: 0.39 UG/ML(FEU)
DIFFERENTIAL METHOD BLD: ABNORMAL
EOSINOPHIL # BLD: 0.06 K/UL (ref 0–0.4)
EOSINOPHIL NFR BLD: 1.3 % (ref 0–5)
ERYTHROCYTE [DISTWIDTH] IN BLOOD BY AUTOMATED COUNT: 11.2 % (ref 11.6–14.5)
GLOBULIN SER CALC-MCNC: 3.2 G/DL (ref 2–4)
GLUCOSE BLD STRIP.AUTO-MCNC: 126 MG/DL (ref 70–110)
GLUCOSE SERPL-MCNC: 116 MG/DL (ref 74–99)
HCT VFR BLD AUTO: 41.1 % (ref 35–45)
HGB BLD-MCNC: 15 G/DL (ref 12–16)
IMM GRANULOCYTES # BLD AUTO: 0.02 K/UL (ref 0–0.04)
IMM GRANULOCYTES NFR BLD AUTO: 0.4 % (ref 0–0.5)
LYMPHOCYTES # BLD: 1.66 K/UL (ref 0.9–3.6)
LYMPHOCYTES NFR BLD: 35 % (ref 21–52)
MAGNESIUM SERPL-MCNC: 1.6 MG/DL (ref 1.6–2.6)
MCH RBC QN AUTO: 34.4 PG (ref 24–34)
MCHC RBC AUTO-ENTMCNC: 36.5 G/DL (ref 31–37)
MCV RBC AUTO: 94.3 FL (ref 78–100)
MONOCYTES # BLD: 0.8 K/UL (ref 0.05–1.2)
MONOCYTES NFR BLD: 16.9 % (ref 3–10)
NEUTS SEG # BLD: 2.16 K/UL (ref 1.8–8)
NEUTS SEG NFR BLD: 45.6 % (ref 40–73)
NRBC # BLD: 0 K/UL (ref 0–0.01)
NRBC BLD-RTO: 0 PER 100 WBC
NT PRO BNP: 22 PG/ML (ref 0–900)
PLATELET # BLD AUTO: 271 K/UL (ref 135–420)
PMV BLD AUTO: 9 FL (ref 9.2–11.8)
POTASSIUM SERPL-SCNC: 3.9 MMOL/L (ref 3.5–5.5)
PROT SERPL-MCNC: 7.6 G/DL (ref 6.4–8.2)
RBC # BLD AUTO: 4.36 M/UL (ref 4.2–5.3)
SODIUM SERPL-SCNC: 132 MMOL/L (ref 136–145)
TROPONIN I SERPL HS-MCNC: 17 NG/L (ref 0–54)
TROPONIN I SERPL HS-MCNC: 20 NG/L (ref 0–54)
WBC # BLD AUTO: 4.7 K/UL (ref 4.6–13.2)

## 2025-03-31 PROCEDURE — 6360000002 HC RX W HCPCS: Performed by: EMERGENCY MEDICINE

## 2025-03-31 PROCEDURE — 99285 EMERGENCY DEPT VISIT HI MDM: CPT

## 2025-03-31 PROCEDURE — 93005 ELECTROCARDIOGRAM TRACING: CPT | Performed by: EMERGENCY MEDICINE

## 2025-03-31 PROCEDURE — 82962 GLUCOSE BLOOD TEST: CPT

## 2025-03-31 PROCEDURE — 71275 CT ANGIOGRAPHY CHEST: CPT

## 2025-03-31 PROCEDURE — 96376 TX/PRO/DX INJ SAME DRUG ADON: CPT

## 2025-03-31 PROCEDURE — 6360000004 HC RX CONTRAST MEDICATION: Performed by: EMERGENCY MEDICINE

## 2025-03-31 PROCEDURE — 6370000000 HC RX 637 (ALT 250 FOR IP): Performed by: EMERGENCY MEDICINE

## 2025-03-31 PROCEDURE — 83880 ASSAY OF NATRIURETIC PEPTIDE: CPT

## 2025-03-31 PROCEDURE — 96375 TX/PRO/DX INJ NEW DRUG ADDON: CPT

## 2025-03-31 PROCEDURE — 83735 ASSAY OF MAGNESIUM: CPT

## 2025-03-31 PROCEDURE — 85025 COMPLETE CBC W/AUTO DIFF WBC: CPT

## 2025-03-31 PROCEDURE — 96374 THER/PROPH/DIAG INJ IV PUSH: CPT

## 2025-03-31 PROCEDURE — 80053 COMPREHEN METABOLIC PANEL: CPT

## 2025-03-31 PROCEDURE — 84484 ASSAY OF TROPONIN QUANT: CPT

## 2025-03-31 PROCEDURE — 85379 FIBRIN DEGRADATION QUANT: CPT

## 2025-03-31 PROCEDURE — 71045 X-RAY EXAM CHEST 1 VIEW: CPT

## 2025-03-31 RX ORDER — IOPAMIDOL 755 MG/ML
100 INJECTION, SOLUTION INTRAVASCULAR
Status: COMPLETED | OUTPATIENT
Start: 2025-03-31 | End: 2025-03-31

## 2025-03-31 RX ORDER — SIMETHICONE 40MG/0.6ML
30 SUSPENSION, DROPS(FINAL DOSAGE FORM)(ML) ORAL EVERY 6 HOURS PRN
Qty: 355 ML | Refills: 0 | Status: SHIPPED | OUTPATIENT
Start: 2025-03-31

## 2025-03-31 RX ORDER — ACETAMINOPHEN 500 MG
1000 TABLET ORAL
Status: COMPLETED | OUTPATIENT
Start: 2025-03-31 | End: 2025-03-31

## 2025-03-31 RX ORDER — LABETALOL HYDROCHLORIDE 5 MG/ML
20 INJECTION, SOLUTION INTRAVENOUS ONCE
Status: COMPLETED | OUTPATIENT
Start: 2025-03-31 | End: 2025-03-31

## 2025-03-31 RX ORDER — MORPHINE SULFATE 4 MG/ML
4 INJECTION, SOLUTION INTRAMUSCULAR; INTRAVENOUS
Status: COMPLETED | OUTPATIENT
Start: 2025-03-31 | End: 2025-03-31

## 2025-03-31 RX ORDER — HYDRALAZINE HYDROCHLORIDE 20 MG/ML
10 INJECTION INTRAMUSCULAR; INTRAVENOUS ONCE
Status: COMPLETED | OUTPATIENT
Start: 2025-03-31 | End: 2025-03-31

## 2025-03-31 RX ORDER — ASPIRIN 81 MG/1
162 TABLET, CHEWABLE ORAL ONCE
Status: COMPLETED | OUTPATIENT
Start: 2025-03-31 | End: 2025-03-31

## 2025-03-31 RX ORDER — DEXLANSOPRAZOLE 30 MG/1
30 CAPSULE, DELAYED RELEASE ORAL DAILY
Qty: 90 CAPSULE | Refills: 0 | Status: SHIPPED | OUTPATIENT
Start: 2025-03-31

## 2025-03-31 RX ADMIN — HYDRALAZINE HYDROCHLORIDE 10 MG: 20 INJECTION INTRAMUSCULAR; INTRAVENOUS at 18:25

## 2025-03-31 RX ADMIN — LIDOCAINE HYDROCHLORIDE 40 ML: 20 SOLUTION ORAL at 19:51

## 2025-03-31 RX ADMIN — NITROGLYCERIN 1 INCH: 20 OINTMENT TOPICAL at 18:23

## 2025-03-31 RX ADMIN — MORPHINE SULFATE 4 MG: 4 INJECTION, SOLUTION INTRAMUSCULAR; INTRAVENOUS at 18:28

## 2025-03-31 RX ADMIN — ACETAMINOPHEN 1000 MG: 500 TABLET ORAL at 18:18

## 2025-03-31 RX ADMIN — MORPHINE SULFATE 4 MG: 4 INJECTION, SOLUTION INTRAMUSCULAR; INTRAVENOUS at 19:45

## 2025-03-31 RX ADMIN — ASPIRIN 81 MG CHEWABLE TABLET 162 MG: 81 TABLET CHEWABLE at 18:18

## 2025-03-31 RX ADMIN — LABETALOL HYDROCHLORIDE 20 MG: 5 INJECTION, SOLUTION INTRAVENOUS at 19:41

## 2025-03-31 RX ADMIN — IOPAMIDOL 100 ML: 755 INJECTION, SOLUTION INTRAVENOUS at 20:22

## 2025-03-31 ASSESSMENT — PAIN - FUNCTIONAL ASSESSMENT
PAIN_FUNCTIONAL_ASSESSMENT: ACTIVITIES ARE NOT PREVENTED
PAIN_FUNCTIONAL_ASSESSMENT: 0-10
PAIN_FUNCTIONAL_ASSESSMENT: INTOLERABLE, UNABLE TO DO ANY ACTIVE OR PASSIVE ACTIVITIES
PAIN_FUNCTIONAL_ASSESSMENT: 0-10

## 2025-03-31 ASSESSMENT — PAIN DESCRIPTION - PAIN TYPE
TYPE: ACUTE PAIN

## 2025-03-31 ASSESSMENT — PAIN DESCRIPTION - LOCATION
LOCATION: CHEST
LOCATION: CHEST;BACK
LOCATION: BACK
LOCATION: BACK

## 2025-03-31 ASSESSMENT — PAIN SCALES - GENERAL
PAINLEVEL_OUTOF10: 2
PAINLEVEL_OUTOF10: 7
PAINLEVEL_OUTOF10: 9
PAINLEVEL_OUTOF10: 8

## 2025-03-31 ASSESSMENT — PAIN DESCRIPTION - DESCRIPTORS
DESCRIPTORS: ACHING;PRESSURE;CRUSHING
DESCRIPTORS: ACHING
DESCRIPTORS: PRESSURE
DESCRIPTORS: SQUEEZING

## 2025-03-31 ASSESSMENT — PAIN DESCRIPTION - ORIENTATION
ORIENTATION: MID

## 2025-03-31 ASSESSMENT — PAIN DESCRIPTION - FREQUENCY
FREQUENCY: CONTINUOUS

## 2025-03-31 ASSESSMENT — PAIN DESCRIPTION - ONSET: ONSET: GRADUAL

## 2025-03-31 NOTE — ED TRIAGE NOTES
Patient arrived to ER with complaints of chest pain, shortness of breath and felt like her feet were coming out from under her. Reports chest pain as if something is sitting on her chest and radiates to the middle of her shoulder blades.

## 2025-03-31 NOTE — ED PROVIDER NOTES
g/dL    Hematocrit 41.1 35.0 - 45.0 %    MCV 94.3 78.0 - 100.0 FL    MCH 34.4 (H) 24.0 - 34.0 PG    MCHC 36.5 31.0 - 37.0 g/dL    RDW 11.2 (L) 11.6 - 14.5 %    Platelets 271 135 - 420 K/uL    MPV 9.0 (L) 9.2 - 11.8 FL    Nucleated RBCs 0.0 0  WBC    nRBC 0.00 0.00 - 0.01 K/uL    Neutrophils % 45.6 40.0 - 73.0 %    Lymphocytes % 35.0 21.0 - 52.0 %    Monocytes % 16.9 (H) 3.0 - 10.0 %    Eosinophils % 1.3 0.0 - 5.0 %    Basophils % 0.8 0.0 - 2.0 %    Immature Granulocytes % 0.4 0.0 - 0.5 %    Neutrophils Absolute 2.16 1.80 - 8.00 K/UL    Lymphocytes Absolute 1.66 0.90 - 3.60 K/UL    Monocytes Absolute 0.80 0.05 - 1.20 K/UL    Eosinophils Absolute 0.06 0.00 - 0.40 K/UL    Basophils Absolute 0.04 0.00 - 0.10 K/UL    Immature Granulocytes Absolute 0.02 0.00 - 0.04 K/UL    Differential Type AUTOMATED     Comprehensive Metabolic Panel    Collection Time: 03/31/25  5:50 PM   Result Value Ref Range    Sodium 132 (L) 136 - 145 mmol/L    Potassium 3.9 3.5 - 5.5 mmol/L    Chloride 99 (L) 100 - 111 mmol/L    CO2 26 21 - 32 mmol/L    Anion Gap 7 3.0 - 18 mmol/L    Glucose 116 (H) 74 - 99 mg/dL    BUN 8 7.0 - 18 MG/DL    Creatinine 0.59 (L) 0.6 - 1.3 MG/DL    BUN/Creatinine Ratio 14 12 - 20      Est, Glom Filt Rate >90 >60 ml/min/1.73m2    Calcium 9.2 8.5 - 10.1 MG/DL    Total Bilirubin 0.6 0.2 - 1.0 MG/DL    ALT 71 (H) 13 - 56 U/L    AST 39 (H) 10 - 38 U/L    Alk Phosphatase 85 45 - 117 U/L    Total Protein 7.6 6.4 - 8.2 g/dL    Albumin 4.4 3.4 - 5.0 g/dL    Globulin 3.2 2.0 - 4.0 g/dL    Albumin/Globulin Ratio 1.4 0.8 - 1.7     Magnesium    Collection Time: 03/31/25  5:50 PM   Result Value Ref Range    Magnesium 1.6 1.6 - 2.6 mg/dL   Brain Natriuretic Peptide    Collection Time: 03/31/25  5:50 PM   Result Value Ref Range    NT Pro-BNP 22 0 - 900 PG/ML   Troponin    Collection Time: 03/31/25  5:50 PM   Result Value Ref Range    Troponin, High Sensitivity 20 0 - 54 ng/L   D-Dimer, Quantitative    Collection Time: 03/31/25   pain is radiating through to her back.  Her EKG shows normal sinus rhythm without any ischemic changes.  Her first troponin is negative.  However her story is concerning for ACS versus hypertensive emergency.    I have ordered aspirin, nitroglycerin, morphine, Tylenol, and hydralazine.  Her blood pressure still 193/89.    Disposition Considerations (tests considered but not done, Admit vs D/C, Shared Decision Making, Pt Expectation of Test or Tx.): ***       Critical Care Time:     ***    Yesenia Mayer MD    Diagnosis and Disposition     I Yesenia Mayer MD am the primary clinician of record.        DIAGNOSIS: No diagnosis found.    DISPOSITION                 PATIENT REFERRED TO:  No follow-up provider specified.    DISCHARGE MEDICATIONS:  New Prescriptions    No medications on file       DISCONTINUED MEDICATIONS:  Discontinued Medications    No medications on file              (Please note that portions of this note were completed with a voice recognition program.  Efforts were made to edit the dictations but occasionally words are mis-transcribed.)    Yesenia Mayer MD (electronically signed)        Dragon Disclaimer     Please note that this dictation was completed with [employer], the computer voice recognition software.  Quite often unanticipated grammatical, syntax, homophones, and other interpretive errors are inadvertently transcribed by the computer software.  Please disregard these errors.  Please excuse any errors that have escaped final proofreading.

## 2025-03-31 NOTE — ED NOTES
Introduced self to patient and family at bedside .Pt tolerating Po intake awaiting CT scan pt aware. Assumed care of pt resting cardiac monitor in place respirations even and unlabored , call bell in reach NAD noted, VSS at this time. Endorses back pain upper mid back continuous. Denies injury

## 2025-04-01 LAB
EKG ATRIAL RATE: 83 BPM
EKG DIAGNOSIS: NORMAL
EKG P AXIS: 67 DEGREES
EKG P-R INTERVAL: 124 MS
EKG Q-T INTERVAL: 386 MS
EKG QRS DURATION: 92 MS
EKG QTC CALCULATION (BAZETT): 453 MS
EKG R AXIS: 81 DEGREES
EKG T AXIS: 36 DEGREES
EKG VENTRICULAR RATE: 83 BPM

## 2025-04-01 PROCEDURE — 93010 ELECTROCARDIOGRAM REPORT: CPT | Performed by: INTERNAL MEDICINE

## 2025-04-01 NOTE — ED NOTES
At bedside pt on cardiac monitor , pt updated on care ,awaiting repeat troponin and CT results. Pt and family verbalized understanding.

## 2025-04-01 NOTE — ED NOTES
Patient signed out to me pending repeat troponin and CTA.  Repeat troponin is stable.  CTA shows no acute abnormalities.  On reexamination he states that she is feeling much better.  She states that she feels like the oral GI cocktail is what helps her the most.  She states that she does have a history of this type of pain and previously it was fixed with Dexilant.  Discussed with her that I am happy to prescribe this to see if that helps.  She already has a gastroenterologist that she follows with.  Recommend close follow-up with her primary care physician to make sure this is improving.  However would also recommend that she still sees a cardiologist due to her chest pain today.    Patient stable for discharge at this time.  Patient is in agreement with the plan to be discharged at this time.  All the patient's questions were answered.  Patient was given written instructions on the diagnosis, and states understanding of the plan moving forward.  We did discuss important signs and symptoms that should prompt quick return to the emergency department.    Disposition: Patient was discharged home in stable condition.  They will follow up with their primary care physician in cardiology    Prescriptions: Dexilant, Mylanta    Diagnosis: Acute chest and back pain     Emiliana Alvarez MD  03/31/25 [id number]

## 2025-04-01 NOTE — ED NOTES
PIV removed by ED tech, pt refused wheelchair. Ambulatory gait steady, Nitro paste removed from chest Discharge discussed with patient all questions answered,  no issues or concerns voiced at this time.

## 2025-04-02 ENCOUNTER — PATIENT MESSAGE (OUTPATIENT)
Facility: CLINIC | Age: 55
End: 2025-04-02

## 2025-04-09 NOTE — PATIENT INSTRUCTIONS
You may now shower and get your incisions wet. We recommend starting scar massage in 1 week to your incision(s). Take Vitamin E, Cocoa Butter or Scar Cream and massage the incision 2 times a day. This will help soften your incisions and help de-sensitize the skin as the nerves \"wake up\".     Continue with your Xarelto until you are finished with your 14-day course.  After this please start one 325 mg aspirin.  We will do this daily until you are able to bear weight.   Detail Level: None Number Of Tubes Drawn: 2 Bill For Individual Tests Below?: no Venipuncture Paragraph: An alcohol pad was applied to the venipuncture site. Venipuncture was performed using a butterfly needle. Pressure and a bandaid was applied to the site. No complications were noted.

## 2025-04-17 ENCOUNTER — PATIENT MESSAGE (OUTPATIENT)
Facility: CLINIC | Age: 55
End: 2025-04-17

## 2025-04-17 ENCOUNTER — OFFICE VISIT (OUTPATIENT)
Age: 55
End: 2025-04-17
Payer: COMMERCIAL

## 2025-04-17 VITALS
SYSTOLIC BLOOD PRESSURE: 140 MMHG | OXYGEN SATURATION: 98 % | HEART RATE: 89 BPM | BODY MASS INDEX: 25.71 KG/M2 | WEIGHT: 160 LBS | DIASTOLIC BLOOD PRESSURE: 70 MMHG | HEIGHT: 66 IN

## 2025-04-17 DIAGNOSIS — R94.31 ABNORMAL ELECTROCARDIOGRAPHY: Primary | ICD-10-CM

## 2025-04-17 DIAGNOSIS — R01.1 HEART MURMUR: ICD-10-CM

## 2025-04-17 DIAGNOSIS — F41.9 ANXIETY: ICD-10-CM

## 2025-04-17 DIAGNOSIS — E03.9 ACQUIRED HYPOTHYROIDISM: ICD-10-CM

## 2025-04-17 DIAGNOSIS — R07.9 CHEST PAIN, UNSPECIFIED TYPE: ICD-10-CM

## 2025-04-17 PROCEDURE — 99204 OFFICE O/P NEW MOD 45 MIN: CPT | Performed by: INTERNAL MEDICINE

## 2025-04-17 PROCEDURE — 3077F SYST BP >= 140 MM HG: CPT | Performed by: INTERNAL MEDICINE

## 2025-04-17 PROCEDURE — 3078F DIAST BP <80 MM HG: CPT | Performed by: INTERNAL MEDICINE

## 2025-04-17 RX ORDER — DEXLANSOPRAZOLE 30 MG/1
30 CAPSULE, DELAYED RELEASE ORAL DAILY
Qty: 90 CAPSULE | Refills: 2 | Status: SHIPPED | OUTPATIENT
Start: 2025-04-17

## 2025-04-17 RX ORDER — LISINOPRIL 20 MG/1
20 TABLET ORAL DAILY
Qty: 90 TABLET | Refills: 1 | Status: SHIPPED | OUTPATIENT
Start: 2025-04-17

## 2025-04-17 RX ORDER — SIMVASTATIN 20 MG
20 TABLET ORAL NIGHTLY
Qty: 90 TABLET | Refills: 1 | Status: SHIPPED | OUTPATIENT
Start: 2025-04-17

## 2025-04-17 RX ORDER — PAROXETINE 20 MG/1
20 TABLET, FILM COATED ORAL DAILY
Qty: 90 TABLET | Refills: 1 | Status: SHIPPED | OUTPATIENT
Start: 2025-04-17

## 2025-04-17 RX ORDER — LEVOTHYROXINE SODIUM 137 UG/1
137 TABLET ORAL DAILY
Qty: 90 TABLET | Refills: 1 | Status: SHIPPED | OUTPATIENT
Start: 2025-04-17

## 2025-04-17 ASSESSMENT — ANXIETY QUESTIONNAIRES
2. NOT BEING ABLE TO STOP OR CONTROL WORRYING: NOT AT ALL
3. WORRYING TOO MUCH ABOUT DIFFERENT THINGS: NOT AT ALL
IF YOU CHECKED OFF ANY PROBLEMS ON THIS QUESTIONNAIRE, HOW DIFFICULT HAVE THESE PROBLEMS MADE IT FOR YOU TO DO YOUR WORK, TAKE CARE OF THINGS AT HOME, OR GET ALONG WITH OTHER PEOPLE: NOT DIFFICULT AT ALL
5. BEING SO RESTLESS THAT IT IS HARD TO SIT STILL: NOT AT ALL
4. TROUBLE RELAXING: NOT AT ALL
6. BECOMING EASILY ANNOYED OR IRRITABLE: NOT AT ALL
GAD7 TOTAL SCORE: 0
7. FEELING AFRAID AS IF SOMETHING AWFUL MIGHT HAPPEN: NOT AT ALL
1. FEELING NERVOUS, ANXIOUS, OR ON EDGE: NOT AT ALL

## 2025-04-17 ASSESSMENT — PATIENT HEALTH QUESTIONNAIRE - PHQ9
SUM OF ALL RESPONSES TO PHQ QUESTIONS 1-9: 0
SUM OF ALL RESPONSES TO PHQ QUESTIONS 1-9: 0
1. LITTLE INTEREST OR PLEASURE IN DOING THINGS: NOT AT ALL
SUM OF ALL RESPONSES TO PHQ QUESTIONS 1-9: 0
2. FEELING DOWN, DEPRESSED OR HOPELESS: NOT AT ALL
SUM OF ALL RESPONSES TO PHQ QUESTIONS 1-9: 0

## 2025-04-17 NOTE — PROGRESS NOTES
Celine Ramirez presents today for   Chief Complaint   Patient presents with    New Patient     Self referred for chest pain       Celine Ramirez preferred language for health care discussion is english/other.    Is someone accompanying this pt? no    Is the patient using any DME equipment during OV? no    Depression Screening:  Depression: Not at risk (11/12/2024)    PHQ-2     PHQ-2 Score: 0        Learning Assessment:  No question data found.       Pt currently taking Anticoagulant therapy? no    Pt currently taking Antiplatelet therapy ? no      Coordination of Care:  1. Have you been to the ER, urgent care clinic since your last visit? Hospitalized since your last visit? yes    2. Have you seen or consulted any other health care providers outside of the [facility] since your last visit? Include any pap smears or colon screening. no    
  03/31/25 72.6 kg (160 lb)   11/12/24 74.4 kg (164 lb)     Temp Readings from Last 3 Encounters:   03/31/25 98.6 °F (37 °C) (Oral)   11/12/24 98.1 °F (36.7 °C) (Temporal)   04/29/24 98.3 °F (36.8 °C) (Oral)     BP Readings from Last 3 Encounters:   04/17/25 (!) 140/70   03/31/25 105/65   11/12/24 (!) 147/88     Pulse Readings from Last 3 Encounters:   04/17/25 89   03/31/25 77   11/12/24 76       Physical Exam:    BP (!) 140/70 (BP Site: Left Upper Arm, Patient Position: Sitting, BP Cuff Size: Medium Adult)   Pulse 89   Ht 1.676 m (5' 6\")   Wt 72.6 kg (160 lb)   SpO2 98%   BMI 25.82 kg/m²    Physical Exam  Vitals and nursing note reviewed.   Constitutional:       General: She is not in acute distress.     Appearance: Normal appearance.   HENT:      Head: Normocephalic.      Nose: Nose normal.      Mouth/Throat:      Mouth: Mucous membranes are moist.   Eyes:      Extraocular Movements: Extraocular movements intact.      Pupils: Pupils are equal, round, and reactive to light.   Neck:      Vascular: No carotid bruit.   Cardiovascular:      Rate and Rhythm: Normal rate and regular rhythm.      Pulses: Normal pulses.      Heart sounds: No murmur heard.     No friction rub. No gallop.   Pulmonary:      Effort: Pulmonary effort is normal.      Breath sounds: Normal breath sounds. No wheezing or rales.   Abdominal:      Palpations: Abdomen is soft.   Musculoskeletal:      Cervical back: Neck supple.      Right lower leg: No edema.      Left lower leg: No edema.   Skin:     General: Skin is warm and dry.   Neurological:      General: No focal deficit present.      Mental Status: She is alert and oriented to person, place, and time.   Psychiatric:         Mood and Affect: Mood normal.         EKG NSR, no acute ischemia, but nonspecific T wave abn noted    Lab Results   Component Value Date    CHOL 170 01/10/2025    TRIG 85 01/10/2025    HDL 40 01/10/2025     (H) 01/10/2025    VLDL 16 01/10/2025    CHOLHDLRATIO

## 2025-04-17 NOTE — TELEPHONE ENCOUNTER
Called pharmacy. Pt ins only allows for a 30 dys. Also, it is too soon to fill due to pt just picked up Dexilant on 04/02. Please advise.

## 2025-04-17 NOTE — TELEPHONE ENCOUNTER
Last OV: 11/12/2024  Next OV: 9/9/2025  Last refill:   3/27/2025 --- Paxil   2/3/2025 -- Simvastatin  1/23/2025 -- Lisinopril, levothyroxine

## 2025-05-19 ENCOUNTER — OFFICE VISIT (OUTPATIENT)
Facility: CLINIC | Age: 55
End: 2025-05-19
Payer: COMMERCIAL

## 2025-05-19 VITALS
OXYGEN SATURATION: 96 % | SYSTOLIC BLOOD PRESSURE: 133 MMHG | DIASTOLIC BLOOD PRESSURE: 75 MMHG | HEIGHT: 66 IN | BODY MASS INDEX: 25.71 KG/M2 | HEART RATE: 73 BPM | TEMPERATURE: 98.2 F | WEIGHT: 160 LBS | RESPIRATION RATE: 18 BRPM

## 2025-05-19 DIAGNOSIS — F41.9 ANXIETY: ICD-10-CM

## 2025-05-19 DIAGNOSIS — K76.0 NAFLD (NONALCOHOLIC FATTY LIVER DISEASE): ICD-10-CM

## 2025-05-19 DIAGNOSIS — I10 HYPERTENSION, UNSPECIFIED TYPE: ICD-10-CM

## 2025-05-19 DIAGNOSIS — K21.9 GASTROESOPHAGEAL REFLUX DISEASE, UNSPECIFIED WHETHER ESOPHAGITIS PRESENT: Primary | ICD-10-CM

## 2025-05-19 PROCEDURE — 3075F SYST BP GE 130 - 139MM HG: CPT | Performed by: INTERNAL MEDICINE

## 2025-05-19 PROCEDURE — 99214 OFFICE O/P EST MOD 30 MIN: CPT | Performed by: INTERNAL MEDICINE

## 2025-05-19 PROCEDURE — 3078F DIAST BP <80 MM HG: CPT | Performed by: INTERNAL MEDICINE

## 2025-05-19 SDOH — ECONOMIC STABILITY: FOOD INSECURITY: WITHIN THE PAST 12 MONTHS, YOU WORRIED THAT YOUR FOOD WOULD RUN OUT BEFORE YOU GOT MONEY TO BUY MORE.: NEVER TRUE

## 2025-05-19 SDOH — ECONOMIC STABILITY: INCOME INSECURITY: IN THE LAST 12 MONTHS, WAS THERE A TIME WHEN YOU WERE NOT ABLE TO PAY THE MORTGAGE OR RENT ON TIME?: NO

## 2025-05-19 SDOH — ECONOMIC STABILITY: FOOD INSECURITY: WITHIN THE PAST 12 MONTHS, THE FOOD YOU BOUGHT JUST DIDN'T LAST AND YOU DIDN'T HAVE MONEY TO GET MORE.: NEVER TRUE

## 2025-05-19 ASSESSMENT — ENCOUNTER SYMPTOMS
BLOOD IN STOOL: 0
SHORTNESS OF BREATH: 0
ABDOMINAL PAIN: 0
COUGH: 0
EYE PAIN: 0
SORE THROAT: 0
ANAL BLEEDING: 0

## 2025-05-19 NOTE — PROGRESS NOTES
Celine FELICIANO Ramirez presents today for   Chief Complaint   Patient presents with    Hypertension     6 week follow up           \"Have you been to the ER, urgent care clinic since your last visit?  Hospitalized since your last visit?\"    NO    “Have you seen or consulted any other health care providers outside of [facility] since your last visit?”    NO    “Have you had a colorectal cancer screening such as a colonoscopy/FIT/Cologuard?    NO    No colonoscopy on file  No cologuard on file  No FIT/FOBT on file   No flexible sigmoidoscopy on file            
Negative for headaches.   Hematological:  Does not bruise/bleed easily.   Psychiatric/Behavioral:  Negative for suicidal ideas.          Objective     /75 (BP Site: Left Upper Arm, Patient Position: Sitting, BP Cuff Size: Medium Adult)   Pulse 73   Temp 98.2 °F (36.8 °C) (Temporal)   Resp 18   Ht 1.676 m (5' 6\")   Wt 72.6 kg (160 lb)   SpO2 96%   BMI 25.82 kg/m²      Physical Exam  Constitutional:       Appearance: Normal appearance.   HENT:      Head: Normocephalic and atraumatic.      Right Ear: External ear normal.      Left Ear: External ear normal.   Eyes:      General: No scleral icterus.        Right eye: No discharge.         Left eye: No discharge.      Conjunctiva/sclera: Conjunctivae normal.   Cardiovascular:      Rate and Rhythm: Normal rate and regular rhythm.      Pulses: Normal pulses.      Heart sounds:      No friction rub. No gallop.   Pulmonary:      Effort: Pulmonary effort is normal.      Breath sounds: Normal breath sounds.   Abdominal:      General: Abdomen is flat. Bowel sounds are normal. There is no distension.      Palpations: Abdomen is soft.      Tenderness: There is no abdominal tenderness.   Musculoskeletal:         General: No swelling (BUE) or tenderness (BUE).      Cervical back: Neck supple.   Lymphadenopathy:      Cervical: No cervical adenopathy.   Skin:     General: Skin is warm and dry.      Findings: No erythema.   Neurological:      Mental Status: She is alert. Mental status is at baseline.      Gait: Gait normal.   Psychiatric:         Mood and Affect: Mood normal.           LABS   Data Review:   Lab Results   Component Value Date/Time    WBC 4.7 03/31/2025 05:50 PM    HGB 15.0 03/31/2025 05:50 PM    HCT 41.1 03/31/2025 05:50 PM     03/31/2025 05:50 PM    MCV 94.3 03/31/2025 05:50 PM       Lab Results   Component Value Date/Time     03/31/2025 05:50 PM    K 3.9 03/31/2025 05:50 PM    CL 99 03/31/2025 05:50 PM    CO2 26 03/31/2025 05:50 PM    BUN 8

## 2025-05-21 ENCOUNTER — PATIENT MESSAGE (OUTPATIENT)
Facility: CLINIC | Age: 55
End: 2025-05-21

## 2025-05-27 ENCOUNTER — PATIENT MESSAGE (OUTPATIENT)
Age: 55
End: 2025-05-27

## 2025-05-29 ENCOUNTER — RESULTS FOLLOW-UP (OUTPATIENT)
Age: 55
End: 2025-05-29

## 2025-05-30 NOTE — TELEPHONE ENCOUNTER
----- Message from Dr. Carolina Amador DO sent at 5/30/2025  9:46 AM EDT -----  LV perfusion is normal/ low risk stress test  ----- Message -----  From: Raya Malone, RN  Sent: 5/29/2025   2:35 PM EDT  To: Carolina Amador DO    Per your last office note:    Chest pain r/o UA  Hyperlipidemia  HTN  H/o prediabetes/ insulin resistance, diet controlled  H/o stress CMP 2010  Mitral valve prolapse (per pt)  Longstanding tobacco  +FH premature ASCVD, incl father and brother     Exercise nuc- CP, abn EKG, post ER follow up  Echo- murmur, HTN urgency  RTC with NP review results, and if low risk/ WNL would recommend starting ASA 81 mg and higher intensity statin to get LDL <70

## 2025-06-06 ENCOUNTER — PATIENT MESSAGE (OUTPATIENT)
Facility: CLINIC | Age: 55
End: 2025-06-06

## 2025-06-11 ENCOUNTER — OFFICE VISIT (OUTPATIENT)
Age: 55
End: 2025-06-11
Payer: COMMERCIAL

## 2025-06-11 VITALS
SYSTOLIC BLOOD PRESSURE: 118 MMHG | OXYGEN SATURATION: 98 % | WEIGHT: 159 LBS | HEART RATE: 81 BPM | BODY MASS INDEX: 25.55 KG/M2 | HEIGHT: 66 IN | DIASTOLIC BLOOD PRESSURE: 70 MMHG

## 2025-06-11 DIAGNOSIS — I10 ESSENTIAL (PRIMARY) HYPERTENSION: ICD-10-CM

## 2025-06-11 DIAGNOSIS — E78.5 HYPERLIPIDEMIA, UNSPECIFIED HYPERLIPIDEMIA TYPE: Primary | ICD-10-CM

## 2025-06-11 PROCEDURE — 3078F DIAST BP <80 MM HG: CPT | Performed by: NURSE PRACTITIONER

## 2025-06-11 PROCEDURE — 3074F SYST BP LT 130 MM HG: CPT | Performed by: NURSE PRACTITIONER

## 2025-06-11 PROCEDURE — 99214 OFFICE O/P EST MOD 30 MIN: CPT | Performed by: NURSE PRACTITIONER

## 2025-06-11 RX ORDER — ATORVASTATIN CALCIUM 20 MG/1
20 TABLET, FILM COATED ORAL DAILY
Qty: 90 TABLET | Refills: 3 | Status: SHIPPED | OUTPATIENT
Start: 2025-06-11

## 2025-06-11 RX ORDER — ASPIRIN 81 MG/1
81 TABLET ORAL DAILY
Qty: 90 TABLET | Refills: 3 | Status: SHIPPED | OUTPATIENT
Start: 2025-06-11

## 2025-06-11 ASSESSMENT — ENCOUNTER SYMPTOMS
VOMITING: 0
DIARRHEA: 0
BLOOD IN STOOL: 0
CONSTIPATION: 0
WHEEZING: 0
COUGH: 0
ABDOMINAL DISTENTION: 0
NAUSEA: 0
SHORTNESS OF BREATH: 0
CHEST TIGHTNESS: 0

## 2025-06-11 ASSESSMENT — PATIENT HEALTH QUESTIONNAIRE - PHQ9
SUM OF ALL RESPONSES TO PHQ QUESTIONS 1-9: 0
2. FEELING DOWN, DEPRESSED OR HOPELESS: NOT AT ALL
SUM OF ALL RESPONSES TO PHQ QUESTIONS 1-9: 0
1. LITTLE INTEREST OR PLEASURE IN DOING THINGS: NOT AT ALL

## 2025-06-11 NOTE — PROGRESS NOTES
Celine Ramirez presents today for   Chief Complaint   Patient presents with    Follow-up     F/u after testing        Celine Ramirez preferred language for health care discussion is english/other.    Is someone accompanying this pt? no    Is the patient using any DME equipment during OV? no    Depression Screening:  Depression: Not at risk (6/11/2025)    PHQ-2     PHQ-2 Score: 0        Learning Assessment:  Who is the primary learner? Patient    What is the preferred language for health care of the primary learner? ENGLISH    How does the primary learner prefer to learn new concepts? DEMONSTRATION    Answered By patient    Relationship to Learner SELF           Pt currently taking Anticoagulant therapy? no    Pt currently taking Antiplatelet therapy ? no      Coordination of Care:  1. Have you been to the ER, urgent care clinic since your last visit? Hospitalized since your last visit? no    2. Have you seen or consulted any other health care providers outside of the [facility] since your last visit? Include any pap smears or colon screening. no    
AM     07/14/2023 12:00 AM     Lab Results   Component Value Date/Time    ALT 71 03/31/2025 05:50 PM       Impression/Plan:  Hypertension, blood pressure controlled  Hypercholesterolemia, currently on simvastatin 20mg but will be switched to atorvastatin 20mg to improve LDL  History of pre-diabetes mellitus, recommend Hgb A1c less than 7% from cardiac standpoint, diet controlled  Tobacco use      Celine Ramirez was seen today for a post-testing follow-up.  She completed an Echo and nuclear stress test on 5/28/25.   The echo showed an EF of 67%, normal wall motion, trace MR, trace TR with RVSP 30 mmHg. The nuclear stress test showed normal LV perfusion and no evidence of inducible ischemia. Considered a low risk stress test.     Results discussed with her and reassured that echo and stress test were normal. Will start her on ASA 81mg daily and switch her from simvastatin to atorvastatin 20mg to get her LDL less than 70.  Will request that she have a lipid panel and LFTs done in 2 months.    She has been feeling well and offers no cardiac complaints.  She has not had any more episodes of chest pain radiating to her back since she began taking Dexilant.    Her blood pressure is well controlled and she does not exhibit any signs of volume overload at this time. No murmur was noted on exam today.     Time:  30 minutes    She will follow-up with Dr. Amador as scheduled and as needed.      Dafne Xavier MSN, FNP-BC    Please note:  Portions of this chart were created with Dragon medical speech to text program.  Unrecognized errors may be present.

## 2025-06-11 NOTE — PATIENT INSTRUCTIONS
Begin Aspirin 81mg daily  Discontinue simvastatin  Begin atorvastatin 20mg once a day  All other medications to remain the same  Lipid panel and hepatic panel in 2 months - 12 hour fast- requisition in chart  Follow-up with Dr. Amador as scheduled and as needed - please call to reschedule if this date/time is not good for you (April 17, 2026 @ 11:20am)

## 2025-07-18 DIAGNOSIS — F41.9 ANXIETY: ICD-10-CM

## 2025-07-18 RX ORDER — PAROXETINE 20 MG/1
20 TABLET, FILM COATED ORAL DAILY
Qty: 90 TABLET | Refills: 1 | Status: SHIPPED | OUTPATIENT
Start: 2025-07-18

## 2025-07-18 RX ORDER — DEXLANSOPRAZOLE 30 MG/1
30 CAPSULE, DELAYED RELEASE ORAL DAILY
Qty: 90 CAPSULE | Refills: 2 | Status: SHIPPED | OUTPATIENT
Start: 2025-07-18

## 2025-07-18 NOTE — TELEPHONE ENCOUNTER
Last OV: 5/19/2025  Next OV: not scheduled   Last refill: 4/17/2025       Please call patient to have 9/2025 scheduled. Labs are scheduled but no follow up appointment.

## 2025-07-30 DIAGNOSIS — E03.9 ACQUIRED HYPOTHYROIDISM: ICD-10-CM

## 2025-07-30 RX ORDER — LISINOPRIL 20 MG/1
20 TABLET ORAL DAILY
Qty: 90 TABLET | Refills: 2 | Status: SHIPPED | OUTPATIENT
Start: 2025-07-30

## 2025-07-30 RX ORDER — LEVOTHYROXINE SODIUM 137 UG/1
137 TABLET ORAL DAILY
Qty: 90 TABLET | Refills: 1 | Status: SHIPPED | OUTPATIENT
Start: 2025-07-30

## 2025-09-05 RX ORDER — ATORVASTATIN CALCIUM 20 MG/1
20 TABLET, FILM COATED ORAL DAILY
Qty: 90 TABLET | Refills: 3 | Status: SHIPPED | OUTPATIENT
Start: 2025-09-05

## 2025-09-05 RX ORDER — ASPIRIN 81 MG/1
81 TABLET ORAL DAILY
Qty: 90 TABLET | Refills: 3 | Status: SHIPPED | OUTPATIENT
Start: 2025-09-05

## (undated) DEVICE — PACK SURG BSHR TOT KNEE LF

## (undated) DEVICE — ELECTRODE PT RET AD L9FT HI MOIST COND ADH HYDRGEL CORDED

## (undated) DEVICE — BIT DRL DIA2.6MM CANN FOR ANK FRAC MGMT SYS

## (undated) DEVICE — BANDAGE,GAUZE,BULKEE II,4.5"X4.1YD,STRL: Brand: MEDLINE

## (undated) DEVICE — BANDAGE COMPR W6INXL3YD EXSANGUATION 1 PLY ESMARCH

## (undated) DEVICE — GUIDEWIRE ARTHSCP DIA1.35MM FOR PAT FRAC SYS

## (undated) DEVICE — SUTURE FIBERTAPE W2MMXL17IN BLU STR NDL BRAID POLYBLEND AR723717LN

## (undated) DEVICE — SPLINT ORTH AD L24IN FOR 29IN THGH UNIV KNEE FOAM

## (undated) DEVICE — INTENDED FOR TISSUE SEPARATION, AND OTHER PROCEDURES THAT REQUIRE A SHARP SURGICAL BLADE TO PUNCTURE OR CUT.: Brand: BARD-PARKER SAFETY BLADES SIZE 10, STERILE

## (undated) DEVICE — LIGHT HANDLE: Brand: DEVON

## (undated) DEVICE — BRACE KNEE 17 27IN R L UNIV FIT UPTO 305IN THGH T SCP

## (undated) DEVICE — SUTURE VCRL SZ 2-0 L27IN ABSRB VLT L36MM CT-1 1/2 CIR J339H

## (undated) DEVICE — Device

## (undated) DEVICE — SUTURE ETHBND 5 L20IN NONABSORBABLE GRN LR L75MM 3/8 CIR B409T

## (undated) DEVICE — SOLUTION IRRIG 1000ML 0.9% SOD CHL USP POUR PLAS BTL

## (undated) DEVICE — BLADE CLIPPER GEN PURP NS

## (undated) DEVICE — SOLUTION IRRIG 1000ML H2O STRL BLT

## (undated) DEVICE — DRESSING,GAUZE,XEROFORM,CURAD,1"X8",ST: Brand: CURAD